# Patient Record
Sex: MALE | Race: WHITE | NOT HISPANIC OR LATINO | Employment: FULL TIME | URBAN - METROPOLITAN AREA
[De-identification: names, ages, dates, MRNs, and addresses within clinical notes are randomized per-mention and may not be internally consistent; named-entity substitution may affect disease eponyms.]

---

## 2017-01-16 ENCOUNTER — OFFICE VISIT (OUTPATIENT)
Dept: INTERNAL MEDICINE | Age: 52
End: 2017-01-16

## 2017-01-16 VITALS
OXYGEN SATURATION: 95 % | BODY MASS INDEX: 32.51 KG/M2 | DIASTOLIC BLOOD PRESSURE: 80 MMHG | HEIGHT: 72 IN | WEIGHT: 240 LBS | HEART RATE: 85 BPM | SYSTOLIC BLOOD PRESSURE: 134 MMHG | TEMPERATURE: 96.4 F

## 2017-01-16 DIAGNOSIS — Z23 NEED FOR VACCINATION FOR STREP PNEUMONIAE: ICD-10-CM

## 2017-01-16 DIAGNOSIS — R73.03 PREDIABETES: Chronic | ICD-10-CM

## 2017-01-16 DIAGNOSIS — Z23 NEED FOR DIPHTHERIA-TETANUS-PERTUSSIS (TDAP) VACCINE: ICD-10-CM

## 2017-01-16 DIAGNOSIS — F17.219 CIGARETTE NICOTINE DEPENDENCE WITH NICOTINE-INDUCED DISORDER: Chronic | ICD-10-CM

## 2017-01-16 DIAGNOSIS — I10 ESSENTIAL HYPERTENSION: Primary | Chronic | ICD-10-CM

## 2017-01-16 DIAGNOSIS — E66.9 OBESITY (BMI 30-39.9): Chronic | ICD-10-CM

## 2017-01-16 DIAGNOSIS — E78.5 HYPERLIPIDEMIA, UNSPECIFIED HYPERLIPIDEMIA TYPE: Chronic | ICD-10-CM

## 2017-01-16 PROCEDURE — 99214 OFFICE O/P EST MOD 30 MIN: CPT | Performed by: INTERNAL MEDICINE

## 2017-01-16 PROCEDURE — 90472 IMMUNIZATION ADMIN EACH ADD: CPT | Performed by: INTERNAL MEDICINE

## 2017-01-16 PROCEDURE — 90471 IMMUNIZATION ADMIN: CPT | Performed by: INTERNAL MEDICINE

## 2017-01-16 PROCEDURE — 90715 TDAP VACCINE 7 YRS/> IM: CPT | Performed by: INTERNAL MEDICINE

## 2017-01-16 PROCEDURE — 90670 PCV13 VACCINE IM: CPT | Performed by: INTERNAL MEDICINE

## 2017-01-16 PROCEDURE — 99406 BEHAV CHNG SMOKING 3-10 MIN: CPT | Performed by: INTERNAL MEDICINE

## 2017-01-16 RX ORDER — ATORVASTATIN CALCIUM 10 MG/1
10 TABLET, FILM COATED ORAL DAILY
Qty: 90 TABLET | Refills: 1 | COMMUNITY
Start: 2017-01-16 | End: 2018-01-15 | Stop reason: SDUPTHER

## 2017-01-16 NOTE — ASSESSMENT & PLAN NOTE
-Counseled on smoking cessation and strongly advised him to quit. (5 minutes)  -Discussed treatment options including nicotine replacement therapy, Wellbutrin, and Chantix. He is not committed to quitting currently.  -Discussed risks/dangers of smoking including risk for various cancers, lung disease and cardiovascular disease.    -F/U with smoking status on next visit

## 2017-01-16 NOTE — PROGRESS NOTES
"Emil HIGUERA Mac / 51 y.o. / male  01/16/2017    VITALS    Visit Vitals   • /80   • Pulse 85   • Temp 96.4 °F (35.8 °C)   • Ht 72\" (182.9 cm)   • Wt 240 lb (109 kg)   • SpO2 95%   • BMI 32.55 kg/m2     BP Readings from Last 3 Encounters:   01/16/17 134/80   12/07/16 132/94     Wt Readings from Last 3 Encounters:   01/16/17 240 lb (109 kg)   12/07/16 235 lb (107 kg)      Body mass index is 32.55 kg/(m^2).    CC:  Main reason(s) for today's visit: Establish Care and Hypertension      HPI:     Patient is a 51-year-old Slov[place] male here to establish care with me.  He was recently seen by our nurse practitioner for continuation of his usual medications for hypertension and hyperlipidemia.  Patient has history of chronic essential hypertension and has been taking blood pressure medication for approximately 3 years.  He is currently on amlodipine/valsartan and hydrochlorothiazide.  Patient denies history of headaches or chest pains.  Denies known history of heart disease or kidney problems.  He has history of hyperlipidemia on atorvastatin 10 mg without complaints of generalized myalgia.  He has been told in the past that his blood sugar levels were high but not in the region of diabetes.  He denies polyuria or polydipsia.  His weight has been trending upwards.  He does not exercise regularly.  Unfortunately she is also a smoker and has been smoking for 30 years and currently he smokes half-pack a day.  He has tried quitting in the past without success at this time he has not committed himself to quitting.    ____________________________________________________________________    ASSESSMENT & PLAN:    Problem List Items Addressed This Visit        High    HTN (hypertension) - Primary (Chronic)    Current Assessment & Plan     Stable. Continue amlodipine/olmesartan and hctz.         Relevant Medications    amlodipine-olmesartan (CHANA) 5-20 MG per tablet    hydrochlorothiazide (HYDRODIURIL) 25 MG tablet    Other Relevant " Orders    Urinalysis With / Microscopic If Indicated       Medium    Hyperlipidemia (Chronic)    Current Assessment & Plan     Stable. Continue atorvastatin.          Relevant Medications    atorvastatin (LIPITOR) 10 MG tablet    Other Relevant Orders    Lipid Panel With / Chol / HDL Ratio    Comprehensive Metabolic Panel    Prediabetes (Chronic)    Relevant Orders    Hemoglobin A1c    TSH+Free T4       Low    Cigarette nicotine dependence with nicotine-induced disorder (Chronic)    Overview     X 30 yrs         Current Assessment & Plan     -Counseled on smoking cessation and strongly advised him to quit. (5 minutes)  -Discussed treatment options including nicotine replacement therapy, Wellbutrin, and Chantix. He is not committed to quitting currently.  -Discussed risks/dangers of smoking including risk for various cancers, lung disease and cardiovascular disease.    -F/U with smoking status on next visit          Obesity (BMI 30-39.9) (Chronic)    Current Assessment & Plan     Lifestyle modification: Attempt to lose weight, Improve dietary compliance, Begin progressive aerobic exercise program 3-5 days a week, Maintain a low sugar/carbohydrate diet, Follow a low fat, low cholesterol diet and Make dinner the lightest meal of day            Other Visit Diagnoses     Need for diphtheria-tetanus-pertussis (Tdap) vaccine        Relevant Orders    Tdap Vaccine Greater Than or Equal To 6yo IM (Completed)    Need for vaccination for Strep pneumoniae        Relevant Orders    Pneumococcal Conjugate Vaccine 13-Valent All (Completed)        Orders Placed This Encounter   Procedures   • Tdap Vaccine Greater Than or Equal To 6yo IM   • Pneumococcal Conjugate Vaccine 13-Valent All   • Hemoglobin A1c   • Lipid Panel With / Chol / HDL Ratio   • Comprehensive Metabolic Panel   • TSH+Free T4   • Urinalysis With / Microscopic If Indicated       Summary/Discussion:     ·       Return in about 4 months (around 5/16/2017) for F/U  chronic medical problems.    Future Appointments  Date Time Provider Department Center   1/20/2017 9:10 AM [facility] None   5/19/2017 9:40 AM Al Francisco MD [facility] None       ____________________________________________________________________    REVIEW OF SYSTEMS    Review of Systems   Constitutional: Positive for fatigue. Unexpected weight change: gain.   HENT: Negative.    Eyes: Negative.    Respiratory: Negative.  Apnea: snores heavily.    Cardiovascular: Negative.    Gastrointestinal: Negative.    Endocrine: Negative.         Obesity    Genitourinary: Negative.    Musculoskeletal: Negative.    Skin: Negative.    Allergic/Immunologic: Negative.    Neurological: Negative.    Hematological: Negative.    Psychiatric/Behavioral: Negative.        PHYSICAL EXAMINATION    Physical Exam   Constitutional: He appears well-nourished. No distress.   Obese     HENT:   Head: Normocephalic.   Mouth/Throat: No oropharyngeal exudate.   Mellampati Airway Class 4    Eyes: Conjunctivae are normal. Pupils are equal, round, and reactive to light.   Neck: Neck supple. No tracheal deviation present. No thyromegaly present.   Cardiovascular: Normal rate, regular rhythm, normal heart sounds and intact distal pulses.  Exam reveals no gallop and no friction rub.    No murmur heard.  Pulmonary/Chest: Effort normal and breath sounds normal.   Abdominal: Soft. Bowel sounds are normal. He exhibits no distension and no mass. There is no tenderness. No hernia.   Musculoskeletal: He exhibits no edema or deformity.   Lymphadenopathy:     He has no cervical adenopathy.        Right: No supraclavicular adenopathy present.        Left: No supraclavicular adenopathy present.   Neurological: He is alert. He has normal reflexes.   Skin: Skin is warm and intact. No rash noted. No cyanosis or erythema. No pallor. Nails show no clubbing.   Psychiatric: He has a normal mood and affect. His behavior is normal. Judgment and thought  content normal. Cognition and memory are normal.       REVIEWED DATA:    Labs:   No results found for: NA, K, AST, ALT, BUN, CREATININE, EGFRIFNONA, EGFRIFAFRI    No results found for: GLU, HGBA1C    No results found for: LDL, HDL, TRIG, CHOLHDLRATIO    No results found for: TSH, FREET4     No results found for: WBC, HGB, PLT     Imaging:        Medical Tests:        Summary of old records / correspondence / consultant report:        Request outside records:          ALLERGIES:    Review of patient's allergies indicates no known allergies.    MEDICATIONS:  Current Outpatient Prescriptions   Medication Sig Dispense Refill   • amlodipine-olmesartan (CHANA) 5-20 MG per tablet Take 1 tablet by mouth Daily. 90 tablet 1   • atorvastatin (LIPITOR) 10 MG tablet Take 1 tablet by mouth Daily. 90 tablet 1   • hydrochlorothiazide (HYDRODIURIL) 25 MG tablet Take 1 tablet by mouth Daily. 90 tablet 1     No current facility-administered medications for this visit.        [facility]    The following portions of the patient's history were reviewed and updated as appropriate: Allergies / Current Medications / Past Medical History / Surgical History / Social History / Family History    PROBLEM LIST:    Patient Active Problem List   Diagnosis   • HTN (hypertension)   • Hyperlipidemia   • Cigarette nicotine dependence with nicotine-induced disorder   • Obesity (BMI 30-39.9)   • Prediabetes       PAST MEDICAL HX:    Past Medical History   Diagnosis Date   • HTN (hypertension)    • Hyperlipidemia        PAST SURGICAL HX:    Past Surgical History   Procedure Laterality Date   • Appendectomy         SOCIAL HX:    Social History     Social History   • Marital status:      Spouse name: N/A   • Number of children: 3   • Years of education: N/A     Occupational History   • Front director      Social History Main Topics   • Smoking status: Current Every Day Smoker     Packs/day: 0.50     Years: 30.00   • Smokeless tobacco: Never Used       Comment: advised to quit, discussed smoking cessation modalities   • Alcohol use 1.8 - 3.0 oz/week     3 - 5 Standard drinks or equivalent per week   • Drug use: None   • Sexual activity: Not Asked     Other Topics Concern   • None     Social History Narrative       FAMILY HX:    Family History   Problem Relation Age of Onset   • Pancreatic cancer Father       at 76   • No Known Problems Brother

## 2017-01-16 NOTE — ASSESSMENT & PLAN NOTE
Lifestyle modification: Attempt to lose weight, Improve dietary compliance, Begin progressive aerobic exercise program 3-5 days a week, Maintain a low sugar/carbohydrate diet, Follow a low fat, low cholesterol diet and Make dinner the lightest meal of day

## 2017-01-16 NOTE — MR AVS SNAPSHOT
Emil HIGUERA Mac   1/16/2017 1:20 PM   Office Visit    Dept Phone:  [phone]   Encounter #:  [id number]    Provider:  Al Francisco MD   Department:  [facility]                Your Full Care Plan              Your Updated Medication List          This list is accurate as of: 1/16/17  2:28 PM.  Always use your most recent med list.                amlodipine-olmesartan 5-20 MG per tablet   Commonly known as:  CHANA   Take 1 tablet by mouth Daily.       atorvastatin 10 MG tablet   Commonly known as:  LIPITOR   Take 1 tablet by mouth Daily.       hydrochlorothiazide 25 MG tablet   Commonly known as:  HYDRODIURIL   Take 1 tablet by mouth Daily.               We Performed the Following     Comprehensive Metabolic Panel     Hemoglobin A1c     Lipid Panel With / Chol / HDL Ratio     Pneumococcal Conjugate Vaccine 13-Valent All     Tdap Vaccine Greater Than or Equal To 8yo IM     TSH+Free T4     Urinalysis With / Microscopic If Indicated       You Were Diagnosed With        Codes Comments    Essential hypertension    -  Primary ICD-10-CM: I10  ICD-9-CM: 401.9     Hyperlipidemia, unspecified hyperlipidemia type     ICD-10-CM: E78.5  ICD-9-CM: 272.4     Prediabetes     ICD-10-CM: R73.03  ICD-9-CM: 790.29     Obesity (BMI 30-39.9)     ICD-10-CM: E66.9  ICD-9-CM: 278.00     Cigarette nicotine dependence with nicotine-induced disorder     ICD-10-CM: F17.219  ICD-9-CM: 292.9     Need for diphtheria-tetanus-pertussis (Tdap) vaccine     ICD-10-CM: Z23  ICD-9-CM: V06.1     Need for vaccination for Strep pneumoniae     ICD-10-CM: Z23  ICD-9-CM: V03.82       Instructions     None    Patient Instructions History      Upcoming Appointments     Visit Type Date Time Department    NEW PATIENT 1/16/2017  1:20 PM [facility]    [facility] 1/20/2017  9:10 AM [facility]    OFFICE VISIT 5/19/2017  9:40 AM [facility]      MyChart Signup     [facility] MyChart allows you to  "send messages to your doctor, view your test results, renew your prescriptions, schedule appointments, and more. To sign up, go to [employer] and click on the Sign Up Now link in the New User? box. Enter your [employer] Activation Code exactly as it appears below along with the last four digits of your Social Security Number and your Date of Birth () to complete the sign-up process. If you do not sign up before the expiration date, you must request a new code.    [employer] Activation Code: [email or web address]  Expires: 2017  2:25 PM    If you have questions, you can email [email or web address] or call [phone] to talk to our [employer] staff. Remember, [employer] is NOT to be used for urgent needs. For medical emergencies, dial 911.               Other Info from Your Visit           Your Appointments     2017  9:10 AM EST   [facility]   [facility] (--)    [address] [place] [id number]   [phone]            May 19, 2017  9:40 AM EDT   Office Visit with Al Francisco MD   [facility] (--)    [facility] [address]   [phone]           Arrive 15 minutes prior to appointment.              Allergies     No Known Allergies      Reason for Visit     Establish Care     Hypertension           Vital Signs     Blood Pressure Pulse Temperature Height Weight Oxygen Saturation    134/80 85 96.4 °F (35.8 °C) 72\" (182.9 cm) 240 lb (109 kg) 95%    Body Mass Index Smoking Status                32.55 kg/m2 Current Every Day Smoker          Problems and Diagnoses Noted     Tobacco dependence    High blood pressure    High cholesterol or triglycerides    Obesity (BMI 30-39.9)    Prediabetes    Need for diphtheria-tetanus-pertussis (Tdap) vaccine        Need for pneumococcal vaccine          Immunizations Administered     Name Date    Pneumococcal Conjugate 13-Valent     Tdap  "

## 2017-01-20 LAB
ALBUMIN SERPL-MCNC: 5.3 G/DL (ref 3.5–5.2)
ALBUMIN/GLOB SERPL: 2.5 G/DL
ALP SERPL-CCNC: 90 U/L (ref 39–117)
ALT SERPL-CCNC: 55 U/L (ref 1–41)
APPEARANCE UR: CLEAR
AST SERPL-CCNC: 33 U/L (ref 1–40)
BILIRUB SERPL-MCNC: 0.8 MG/DL (ref 0.1–1.2)
BILIRUB UR QL STRIP: NORMAL
BUN SERPL-MCNC: 17 MG/DL (ref 6–20)
BUN/CREAT SERPL: 17.3 (ref 7–25)
CALCIUM SERPL-MCNC: 10.1 MG/DL (ref 8.6–10.5)
CHLORIDE SERPL-SCNC: 100 MMOL/L (ref 98–107)
CHOLEST SERPL-MCNC: 164 MG/DL (ref 0–200)
CHOLEST/HDLC SERPL: 4 {RATIO}
CO2 SERPL-SCNC: 23.6 MMOL/L (ref 22–29)
COLOR UR: YELLOW
CREAT SERPL-MCNC: 0.98 MG/DL (ref 0.76–1.27)
GLOBULIN SER CALC-MCNC: 2.1 GM/DL
GLUCOSE SERPL-MCNC: 150 MG/DL (ref 65–99)
GLUCOSE UR QL: NORMAL
HBA1C MFR BLD: 6.5 % (ref 4.8–5.6)
HDLC SERPL-MCNC: 41 MG/DL (ref 40–60)
HGB UR QL STRIP: NORMAL
KETONES UR QL STRIP: NORMAL
LDLC SERPL CALC-MCNC: 81 MG/DL (ref 0–100)
LEUKOCYTE ESTERASE UR QL STRIP: NORMAL
NITRITE UR QL STRIP: NORMAL
PH UR STRIP: 6 [PH] (ref 5–8)
POTASSIUM SERPL-SCNC: 4.2 MMOL/L (ref 3.5–5.2)
PROT SERPL-MCNC: 7.4 G/DL (ref 6–8.5)
PROT UR QL STRIP: NORMAL
SODIUM SERPL-SCNC: 140 MMOL/L (ref 136–145)
SP GR UR: 1.01 (ref 1–1.03)
T4 FREE SERPL-MCNC: 1.27 NG/DL (ref 0.93–1.7)
TRIGL SERPL-MCNC: 212 MG/DL (ref 0–150)
TSH SERPL DL<=0.005 MIU/L-ACNC: 5.63 MIU/ML (ref 0.27–4.2)
UROBILINOGEN UR STRIP-MCNC: NORMAL MG/DL
VLDLC SERPL CALC-MCNC: 42.4 MG/DL (ref 5–40)

## 2017-01-20 NOTE — PROGRESS NOTES
Call patient with his test result(s) and mail the results to him.    Based on recent labs, he has new onset DM 2. Start metformin  mg 1 tablet BID.   Send order for One Touch glucometer and check BS every day in the morning before breakfast.   Change his f/u to 1 month for Diabetes.

## 2017-01-23 ENCOUNTER — TELEPHONE (OUTPATIENT)
Dept: INTERNAL MEDICINE | Age: 52
End: 2017-01-23

## 2017-01-23 DIAGNOSIS — E11.8 TYPE 2 DIABETES MELLITUS WITH COMPLICATION, WITHOUT LONG-TERM CURRENT USE OF INSULIN (HCC): Primary | ICD-10-CM

## 2017-01-23 PROBLEM — E11.9 DM TYPE 2 (DIABETES MELLITUS, TYPE 2) (HCC): Status: ACTIVE | Noted: 2017-01-23

## 2017-01-23 RX ORDER — BLOOD-GLUCOSE METER
KIT MISCELLANEOUS
Qty: 1 EACH | Refills: 0 | Status: SHIPPED | OUTPATIENT
Start: 2017-01-23 | End: 2017-01-27 | Stop reason: CLARIF

## 2017-01-23 RX ORDER — METFORMIN HYDROCHLORIDE 500 MG/1
500 TABLET, EXTENDED RELEASE ORAL 2 TIMES DAILY
Qty: 60 TABLET | Refills: 0 | Status: SHIPPED | OUTPATIENT
Start: 2017-01-23 | End: 2017-03-11 | Stop reason: SDUPTHER

## 2017-01-23 NOTE — TELEPHONE ENCOUNTER
Pt informed of results, sent pt prescriptions over to pt pharmacy along with glucometer test strips and lancets. Pt will call back for an appt in 1 month per Dr Francisco's request  -    ---- Message -----      From: Al Francisco MD      Sent: 1/20/2017   5:39 PM        To: Alicia Bauer MA     Call patient with his test result(s) and mail the results to him.     Based on recent labs, he has new onset DM 2. Start metformin  mg 1 tablet BID.   Send order for One Touch glucometer and check BS every day in the morning before breakfast.   Change his f/u to 1 month for Diabetes.

## 2017-01-23 NOTE — TELEPHONE ENCOUNTER
----- Message from Alicia Bauer MA sent at 1/23/2017  7:25 AM EST -----      ----- Message -----     From: Al Francisco MD     Sent: 1/20/2017   5:39 PM       To: Alicia Bauer MA    Call patient with his test result(s) and mail the results to him.    Based on recent labs, he has new onset DM 2. Start metformin  mg 1 tablet BID.   Send order for One Touch glucometer and check BS every day in the morning before breakfast.   Change his f/u to 1 month for Diabetes.

## 2017-01-27 ENCOUNTER — TELEPHONE (OUTPATIENT)
Dept: INTERNAL MEDICINE | Age: 52
End: 2017-01-27

## 2017-01-27 DIAGNOSIS — E11.8 TYPE 2 DIABETES MELLITUS WITH COMPLICATION, WITHOUT LONG-TERM CURRENT USE OF INSULIN (HCC): Primary | ICD-10-CM

## 2017-01-27 NOTE — TELEPHONE ENCOUNTER
Pt uses Accu-Chek meter system to check BS once daily. Ins will not cover meter. They will cover Contour Next EZ meter.     Okay to change?    KD

## 2017-03-11 DIAGNOSIS — E11.8 TYPE 2 DIABETES MELLITUS WITH COMPLICATION, WITHOUT LONG-TERM CURRENT USE OF INSULIN (HCC): ICD-10-CM

## 2017-03-13 RX ORDER — METFORMIN HYDROCHLORIDE 500 MG/1
TABLET, EXTENDED RELEASE ORAL
Qty: 60 TABLET | Refills: 2 | Status: SHIPPED | OUTPATIENT
Start: 2017-03-13 | End: 2018-01-19 | Stop reason: SDUPTHER

## 2017-05-19 ENCOUNTER — OFFICE VISIT (OUTPATIENT)
Dept: INTERNAL MEDICINE | Age: 52
End: 2017-05-19

## 2017-05-19 VITALS
TEMPERATURE: 97.9 F | SYSTOLIC BLOOD PRESSURE: 122 MMHG | OXYGEN SATURATION: 94 % | WEIGHT: 238 LBS | DIASTOLIC BLOOD PRESSURE: 80 MMHG | HEIGHT: 72 IN | BODY MASS INDEX: 32.23 KG/M2 | HEART RATE: 69 BPM

## 2017-05-19 DIAGNOSIS — E78.2 MIXED HYPERLIPIDEMIA: Chronic | ICD-10-CM

## 2017-05-19 DIAGNOSIS — F17.219 CIGARETTE NICOTINE DEPENDENCE WITH NICOTINE-INDUCED DISORDER: Chronic | ICD-10-CM

## 2017-05-19 DIAGNOSIS — E66.9 OBESITY (BMI 30-39.9): Chronic | ICD-10-CM

## 2017-05-19 DIAGNOSIS — I10 ESSENTIAL HYPERTENSION: Chronic | ICD-10-CM

## 2017-05-19 DIAGNOSIS — E11.8 TYPE 2 DIABETES MELLITUS WITH COMPLICATION, WITHOUT LONG-TERM CURRENT USE OF INSULIN (HCC): Primary | Chronic | ICD-10-CM

## 2017-05-19 PROBLEM — E11.9 DM TYPE 2 (DIABETES MELLITUS, TYPE 2): Chronic | Status: ACTIVE | Noted: 2017-01-23

## 2017-05-19 PROCEDURE — 99214 OFFICE O/P EST MOD 30 MIN: CPT | Performed by: INTERNAL MEDICINE

## 2017-05-20 LAB
ALBUMIN/CREAT UR: <8.6 MG/G CREAT (ref 0–30)
CREAT UR-MCNC: 34.9 MG/DL
HBA1C MFR BLD: 6.85 % (ref 4.8–5.6)
MICROALBUMIN UR-MCNC: <3 UG/ML
T4 FREE SERPL-MCNC: 1.19 NG/DL (ref 0.93–1.7)
TSH SERPL DL<=0.005 MIU/L-ACNC: 3.01 MIU/ML (ref 0.27–4.2)

## 2017-05-22 ENCOUNTER — TELEPHONE (OUTPATIENT)
Dept: INTERNAL MEDICINE | Age: 52
End: 2017-05-22

## 2017-06-08 DIAGNOSIS — I10 ESSENTIAL HYPERTENSION: ICD-10-CM

## 2017-06-08 RX ORDER — HYDROCHLOROTHIAZIDE 25 MG/1
TABLET ORAL
Qty: 90 TABLET | Refills: 1 | Status: SHIPPED | OUTPATIENT
Start: 2017-06-08 | End: 2017-10-12 | Stop reason: SDUPTHER

## 2017-06-08 RX ORDER — ATORVASTATIN CALCIUM 10 MG/1
TABLET, FILM COATED ORAL
Qty: 90 TABLET | Refills: 1 | Status: SHIPPED | OUTPATIENT
Start: 2017-06-08 | End: 2017-12-06 | Stop reason: SDUPTHER

## 2017-06-08 RX ORDER — AMLODIPINE AND OLMESARTAN MEDOXOMIL 5; 20 MG/1; MG/1
TABLET ORAL
Qty: 90 TABLET | Refills: 1 | Status: SHIPPED | OUTPATIENT
Start: 2017-06-08 | End: 2017-10-03 | Stop reason: SDUPTHER

## 2017-07-12 DIAGNOSIS — E11.8 TYPE 2 DIABETES MELLITUS WITH COMPLICATION, WITHOUT LONG-TERM CURRENT USE OF INSULIN (HCC): ICD-10-CM

## 2017-07-12 RX ORDER — METFORMIN HYDROCHLORIDE 500 MG/1
TABLET, EXTENDED RELEASE ORAL
Qty: 60 TABLET | Refills: 2 | Status: SHIPPED | OUTPATIENT
Start: 2017-07-12 | End: 2018-01-19 | Stop reason: SDUPTHER

## 2017-10-03 DIAGNOSIS — I10 ESSENTIAL HYPERTENSION: ICD-10-CM

## 2017-10-03 RX ORDER — AMLODIPINE AND OLMESARTAN MEDOXOMIL 5; 20 MG/1; MG/1
TABLET ORAL
Qty: 90 TABLET | Refills: 0 | Status: SHIPPED | OUTPATIENT
Start: 2017-10-03 | End: 2018-01-15 | Stop reason: SDUPTHER

## 2017-10-12 ENCOUNTER — TELEPHONE (OUTPATIENT)
Dept: INTERNAL MEDICINE | Age: 52
End: 2017-10-12

## 2017-10-12 DIAGNOSIS — I10 ESSENTIAL HYPERTENSION: ICD-10-CM

## 2017-10-12 RX ORDER — HYDROCHLOROTHIAZIDE 25 MG/1
25 TABLET ORAL DAILY
Qty: 30 TABLET | Refills: 0 | Status: SHIPPED | OUTPATIENT
Start: 2017-10-12 | End: 2017-12-29 | Stop reason: SDUPTHER

## 2017-10-12 NOTE — TELEPHONE ENCOUNTER
[facility] called requesting a verbal to refill rx HCTZ 25mg for the pt.     Pls call [facility] #[phone].

## 2017-12-06 DIAGNOSIS — I10 ESSENTIAL HYPERTENSION: ICD-10-CM

## 2017-12-06 RX ORDER — ATORVASTATIN CALCIUM 10 MG/1
10 TABLET, FILM COATED ORAL DAILY
Qty: 30 TABLET | Refills: 0 | Status: SHIPPED | OUTPATIENT
Start: 2017-12-06 | End: 2018-01-15 | Stop reason: SDUPTHER

## 2017-12-06 RX ORDER — HYDROCHLOROTHIAZIDE 25 MG/1
25 TABLET ORAL DAILY
Qty: 30 TABLET | Refills: 0 | Status: SHIPPED | OUTPATIENT
Start: 2017-12-06 | End: 2018-01-19 | Stop reason: SDUPTHER

## 2017-12-29 DIAGNOSIS — E11.9 TYPE 2 DIABETES MELLITUS WITHOUT COMPLICATION, WITHOUT LONG-TERM CURRENT USE OF INSULIN (HCC): Primary | ICD-10-CM

## 2017-12-29 DIAGNOSIS — I10 ESSENTIAL HYPERTENSION: ICD-10-CM

## 2018-01-02 RX ORDER — HYDROCHLOROTHIAZIDE 25 MG/1
25 TABLET ORAL DAILY
Qty: 30 TABLET | Refills: 0 | Status: SHIPPED | OUTPATIENT
Start: 2018-01-02 | End: 2018-01-19 | Stop reason: SDUPTHER

## 2018-01-02 RX ORDER — METFORMIN HYDROCHLORIDE 500 MG/1
500 TABLET, EXTENDED RELEASE ORAL 2 TIMES DAILY
Qty: 60 TABLET | Refills: 0 | Status: SHIPPED | OUTPATIENT
Start: 2018-01-02 | End: 2018-01-19 | Stop reason: SDUPTHER

## 2018-01-15 ENCOUNTER — TELEPHONE (OUTPATIENT)
Dept: INTERNAL MEDICINE | Age: 53
End: 2018-01-15

## 2018-01-15 DIAGNOSIS — I10 ESSENTIAL HYPERTENSION: ICD-10-CM

## 2018-01-15 DIAGNOSIS — E78.5 HYPERLIPIDEMIA, UNSPECIFIED HYPERLIPIDEMIA TYPE: Chronic | ICD-10-CM

## 2018-01-15 RX ORDER — ATORVASTATIN CALCIUM 10 MG/1
10 TABLET, FILM COATED ORAL DAILY
Qty: 90 TABLET | Refills: 0 | Status: SHIPPED | OUTPATIENT
Start: 2018-01-15 | End: 2018-04-22 | Stop reason: SDUPTHER

## 2018-01-15 RX ORDER — AMLODIPINE AND OLMESARTAN MEDOXOMIL 5; 20 MG/1; MG/1
1 TABLET ORAL DAILY
Qty: 90 TABLET | Refills: 0 | Status: SHIPPED | OUTPATIENT
Start: 2018-01-15 | End: 2018-07-25 | Stop reason: SDUPTHER

## 2018-01-15 NOTE — TELEPHONE ENCOUNTER
Pt called scheduled a f/u and also requesting a refill on his Lipitor and Amlodipine.   [facility] pharmacy  Pt's # [phone]  Thanks SP

## 2018-01-19 ENCOUNTER — OFFICE VISIT (OUTPATIENT)
Dept: INTERNAL MEDICINE | Age: 53
End: 2018-01-19

## 2018-01-19 VITALS
BODY MASS INDEX: 32.64 KG/M2 | HEART RATE: 92 BPM | WEIGHT: 241 LBS | TEMPERATURE: 97.6 F | HEIGHT: 72 IN | DIASTOLIC BLOOD PRESSURE: 92 MMHG | SYSTOLIC BLOOD PRESSURE: 140 MMHG | OXYGEN SATURATION: 95 %

## 2018-01-19 DIAGNOSIS — R74.01 ELEVATED TRANSAMINASE LEVEL: ICD-10-CM

## 2018-01-19 DIAGNOSIS — E66.9 OBESITY (BMI 30-39.9): Chronic | ICD-10-CM

## 2018-01-19 DIAGNOSIS — I10 ESSENTIAL HYPERTENSION: ICD-10-CM

## 2018-01-19 DIAGNOSIS — E78.2 MIXED HYPERLIPIDEMIA: Chronic | ICD-10-CM

## 2018-01-19 DIAGNOSIS — F17.219 CIGARETTE NICOTINE DEPENDENCE WITH NICOTINE-INDUCED DISORDER: Chronic | ICD-10-CM

## 2018-01-19 DIAGNOSIS — E11.8 TYPE 2 DIABETES MELLITUS WITH COMPLICATION, WITHOUT LONG-TERM CURRENT USE OF INSULIN (HCC): Primary | ICD-10-CM

## 2018-01-19 LAB
ALBUMIN SERPL-MCNC: 4.9 G/DL (ref 3.5–5.2)
ALBUMIN/GLOB SERPL: 1.8 G/DL
ALP SERPL-CCNC: 96 U/L (ref 39–117)
ALT SERPL-CCNC: 108 U/L (ref 1–41)
AST SERPL-CCNC: 82 U/L (ref 1–40)
BILIRUB SERPL-MCNC: 0.7 MG/DL (ref 0.1–1.2)
BUN SERPL-MCNC: 13 MG/DL (ref 6–20)
BUN/CREAT SERPL: 14.9 (ref 7–25)
CALCIUM SERPL-MCNC: 9.8 MG/DL (ref 8.6–10.5)
CHLORIDE SERPL-SCNC: 97 MMOL/L (ref 98–107)
CHOLEST SERPL-MCNC: 238 MG/DL (ref 0–200)
CHOLEST/HDLC SERPL: 6.1 {RATIO}
CO2 SERPL-SCNC: 22.9 MMOL/L (ref 22–29)
CREAT SERPL-MCNC: 0.87 MG/DL (ref 0.76–1.27)
GLOBULIN SER CALC-MCNC: 2.7 GM/DL
GLUCOSE SERPL-MCNC: 130 MG/DL (ref 65–99)
HBA1C MFR BLD: 7.2 % (ref 4.8–5.6)
HDLC SERPL-MCNC: 39 MG/DL (ref 40–60)
LDLC SERPL CALC-MCNC: 126 MG/DL (ref 0–100)
POTASSIUM SERPL-SCNC: 4.2 MMOL/L (ref 3.5–5.2)
PROT SERPL-MCNC: 7.6 G/DL (ref 6–8.5)
SODIUM SERPL-SCNC: 138 MMOL/L (ref 136–145)
T4 FREE SERPL-MCNC: 1.24 NG/DL (ref 0.93–1.7)
TRIGL SERPL-MCNC: 365 MG/DL (ref 0–150)
TSH SERPL DL<=0.005 MIU/L-ACNC: 2.94 MIU/ML (ref 0.27–4.2)
VLDLC SERPL CALC-MCNC: 73 MG/DL (ref 5–40)

## 2018-01-19 PROCEDURE — 99214 OFFICE O/P EST MOD 30 MIN: CPT | Performed by: INTERNAL MEDICINE

## 2018-01-19 PROCEDURE — G0009 ADMIN PNEUMOCOCCAL VACCINE: HCPCS | Performed by: INTERNAL MEDICINE

## 2018-01-19 PROCEDURE — 90732 PPSV23 VACC 2 YRS+ SUBQ/IM: CPT | Performed by: INTERNAL MEDICINE

## 2018-01-19 RX ORDER — METFORMIN HYDROCHLORIDE 500 MG/1
500 TABLET, EXTENDED RELEASE ORAL
Qty: 60 TABLET | Refills: 2
Start: 2018-01-19 | End: 2018-01-23 | Stop reason: ALTCHOICE

## 2018-01-19 RX ORDER — HYDROCHLOROTHIAZIDE 25 MG/1
25 TABLET ORAL DAILY
Qty: 30 TABLET | Refills: 0
Start: 2018-01-19 | End: 2018-01-31 | Stop reason: SDUPTHER

## 2018-01-19 NOTE — PROGRESS NOTES
"[facility]  FLORIDALMA CARO M.D.      Emil Watts / 52 y.o. / male  01/19/2018      MEDICATIONS  Current Outpatient Prescriptions   Medication Sig Dispense Refill   • amlodipine-olmesartan (CHANA) 5-20 MG per tablet Take 1 tablet by mouth Daily. 90 tablet 0   • atorvastatin (LIPITOR) 10 MG tablet Take 1 tablet by mouth Daily. 90 tablet 0   • Blood Glucose Monitoring Suppl (TEQUILA CONTOUR NEXT EZ) W/DEVICE kit Use to test blood sugar daily. 1 kit 0   • glucose blood test strip Test one time daily before breakfast 100 each 0   • hydrochlorothiazide (HYDRODIURIL) 25 MG tablet Take 1 tablet by mouth Daily. *PT MUST MAKE APPT FOR FUTURE REFILLS* 30 tablet 0   • Lancet Devices (ACCU-CHEK SOFTCLIX) lancets Test one time daily 100 each 0   • metFORMIN ER (GLUCOPHAGE-XR) 500 MG 24 hr tablet Take 1 tablet by mouth 2 (Two) Times a Day. 60 tablet 2     No current facility-administered medications for this visit.          VITALS:    Visit Vitals   • /92   • Pulse 92   • Temp 97.6 °F (36.4 °C)   • Ht 182.9 cm (72.01\")   • Wt 109 kg (241 lb)   • SpO2 95%   • BMI 32.68 kg/m2       BP Readings from Last 3 Encounters:   01/19/18 140/92   05/19/17 122/80   01/16/17 134/80     Wt Readings from Last 3 Encounters:   01/19/18 109 kg (241 lb)   05/19/17 108 kg (238 lb)   01/16/17 109 kg (240 lb)      Body mass index is 32.68 kg/(m^2).    CC:  Main reason(s) for today's visit: Diabetes      HPI:     Chronic type 2 diabetes:  Home blood sugar levels: does not check, has no significant low sugar symptoms/problems. Current therapy: metformin.  Most recent relevant labs:   Lab Results   Component Value Date    HGBA1C 6.85 (H) 05/19/2017    HGBA1C 6.50 (H) 01/20/2017    CREATININE 0.98 01/20/2017    LDL 81 01/20/2017    MICROALBUR <3.0 05/19/2017     Chronic essential hypertension:  Since prior visit: compliant with medication(s), does not check blood pressure at home and denies significant problems with medication(s).  Most recent " in-office blood pressure readings:   BP Readings from Last 3 Encounters:   01/19/18 140/92   05/19/17 122/80   01/16/17 134/80     Chronic hyperlipidemia:  Current therapy include atorvastatin, denies problems with medication.    Most recent labs:   Lab Results   Component Value Date    LDL 81 01/20/2017    HDL 41 01/20/2017    TRIG 212 (H) 01/20/2017    CHOLHDLRATIO 4.00 01/20/2017     Obesity: Weight has not changed significantly since last visit, has not made any significant dietary improvement, has not made significant changes to physical activity level.  Weight loss efforts: no specific plan(s).  Current Body mass index is 32.68 kg/(m^2)..   Wt Readings from Last 3 Encounters:   01/19/18 109 kg (241 lb)   05/19/17 108 kg (238 lb)   01/16/17 109 kg (240 lb)       Still smokes 1/2 ppd.     Patient Care Team:  Al Francisco MD as PCP - General (Internal Medicine)  ____________________________________________________________________    ASSESSMENT & PLAN:    Problem List Items Addressed This Visit        High    DM type 2 (diabetes mellitus, type 2) - Primary (Chronic)    Current Assessment & Plan     Check a1c level. Continue metformin  mg two daily.   Referral placed to eye specialist again         Relevant Medications    glucose blood test strip    Lancet Devices (ACCU-CHEK SOFTCLIX) lancets    Blood Glucose Monitoring Suppl (TEQUILA CONTOUR NEXT EZ) W/DEVICE kit    metFORMIN ER (GLUCOPHAGE-XR) 500 MG 24 hr tablet    Other Relevant Orders    Hemoglobin A1c    TSH+Free T4    Pneumococcal Polysaccharide Vaccine 23-Valent Greater Than or Equal To 1yo Subcutaneous / IM    Ambulatory Referral to Ophthalmology       Medium    HTN (hypertension) (Chronic)    Overview     Stable. Continue amlodipine/olmesartan 5/20 and hctz 25 mg daily.         Current Assessment & Plan     Monitor home BP.          Relevant Medications    amlodipine-olmesartan (CHANA) 5-20 MG per tablet    hydrochlorothiazide (HYDRODIURIL) 25 MG tablet     Hyperlipidemia (Chronic)    Overview     Stable. Continue atorvastatin.          Relevant Medications    atorvastatin (LIPITOR) 10 MG tablet    Other Relevant Orders    Lipid Panel With / Chol / HDL Ratio    Comprehensive Metabolic Panel       Low    Cigarette nicotine dependence with nicotine-induced disorder (Chronic)    Overview     X 30 yrs         Current Assessment & Plan     Advised to quit smoking. Counseling given.          Obesity (BMI 30-39.9) (Chronic)    Overview     Maintain a low sugar/starch/carbohydrate diet and exercise regularly. Weight loss advised.               Orders Placed This Encounter   Procedures   • Pneumococcal Polysaccharide Vaccine 23-Valent Greater Than or Equal To 3yo Subcutaneous / IM   • Hemoglobin A1c   • Lipid Panel With / Chol / HDL Ratio   • Comprehensive Metabolic Panel   • TSH+Free T4   • Ambulatory Referral to Ophthalmology       Summary/Discussion:  ·     Return in about 4 months (around 5/19/2018) for Diabetes and co-morbid conditions.    No future appointments.    ____________________________________________________________________    REVIEW OF SYSTEMS    Review of Systems  Constitutional neg  Resp neg  CV neg  Gi neg  Neuro neg      PHYSICAL EXAMINATION    Physical Exam  Constitutional  No distress, obese   Cardiovascular Rate  normal . Rhythm: regular . Heart sounds:  Normal  Pulmonary/Chest  Effort normal. Breath sounds:  Normal  Psychiatric  Alert. Judgment and thought content normal. Mood normal    REVIEWED DATA:    Labs:     Lab Results   Component Value Date     01/20/2017    K 4.2 01/20/2017    AST 33 01/20/2017    ALT 55 (H) 01/20/2017    BUN 17 01/20/2017    CREATININE 0.98 01/20/2017    EGFRIFNONA 81 01/20/2017    EGFRIFAFRI 99 01/20/2017       Lab Results   Component Value Date    HGBA1C 6.85 (H) 05/19/2017    HGBA1C 6.50 (H) 01/20/2017     (H) 01/20/2017    MICROALBUR <3.0 05/19/2017       Lab Results   Component Value Date    LDL 81  2017    HDL 41 2017    TRIG 212 (H) 2017    CHOLHDLRATIO 4.00 2017       Lab Results   Component Value Date    TSH 3.010 2017    FREET4 1.19 2017       No results found for: WBC, HGB, PLT    Imaging:         Medical Tests:         Summary of old records / correspondence / consultant report:         Request outside records:         ALLERGIES  No Known Allergies     PFSH:     The following portions of the patient's history were reviewed and updated as appropriate: Allergies / Current Medications / Past Medical History / Surgical History / Social History / Family History    PROBLEM LIST   Patient Active Problem List   Diagnosis   • HTN (hypertension)   • Hyperlipidemia   • Cigarette nicotine dependence with nicotine-induced disorder   • Obesity (BMI 30-39.9)   • DM type 2 (diabetes mellitus, type 2)       PAST MEDICAL HISTORY  Past Medical History:   Diagnosis Date   • Diabetes mellitus    • HTN (hypertension)    • Hyperlipidemia        SURGICAL HISTORY  Past Surgical History:   Procedure Laterality Date   • APPENDECTOMY         SOCIAL HISTORY  Social History     Social History   • Marital status:      Spouse name: N/A   • Number of children: 3   • Years of education: N/A     Occupational History   • Front director      Social History Main Topics   • Smoking status: Current Every Day Smoker     Packs/day: 0.50     Years: 30.00   • Smokeless tobacco: Never Used      Comment: advised to quit, discussed smoking cessation modalities   • Alcohol use 1.8 - 3.0 oz/week     3 - 5 Standard drinks or equivalent per week   • Drug use: None   • Sexual activity: Not Asked     Other Topics Concern   • None     Social History Narrative   • None       FAMILY HISTORY  Family History   Problem Relation Age of Onset   • Pancreatic cancer Father       at 76   • No Known Problems Brother          **Regino Disclaimer:   Much of this encounter note is an electronic transcription/translation of  spoken language to printed text. The electronic translation of spoken language may permit erroneous, or at times, nonsensical words or phrases to be inadvertently transcribed. Although I have reviewed the note for such errors, some may still exist.

## 2018-01-22 NOTE — PROGRESS NOTES
Call patient with his test result(s) and mail the results to him if MyChart is NOT active.    1. Diabetes is uncontrolled. Change metformin to Janumet XR 50/1000 TWO daily.  2. Cholesterol is worse. Doubt he is taking medication. However due to elevated LFTs have him HOLD ATORVASTATIN for now.   3. To evaluate elevated LFTs, will check Liver US and additional liver labs (I will place the order for these).   ** CHANGE NEXT F/U TO 2 MONTHS.

## 2018-01-23 ENCOUNTER — TELEPHONE (OUTPATIENT)
Dept: INTERNAL MEDICINE | Age: 53
End: 2018-01-23

## 2018-01-23 DIAGNOSIS — E11.8 TYPE 2 DIABETES MELLITUS WITH COMPLICATION, WITHOUT LONG-TERM CURRENT USE OF INSULIN (HCC): Primary | Chronic | ICD-10-CM

## 2018-01-23 NOTE — TELEPHONE ENCOUNTER
Pt's daughter called asking why the pt is having to have liver tests done and see a specialist? She wasn't aware anything was wrong.  Please call David Watts @ [phone]  Thanks SP

## 2018-01-23 NOTE — TELEPHONE ENCOUNTER
Attempted to call daughter back to discuss pt's lab results, including elevated LFTs which require further testing. No  or available voicemail.     KD

## 2018-01-23 NOTE — TELEPHONE ENCOUNTER
----- Message from Margaret Almeida LPN sent at 1/23/2018  7:44 AM EST -----      ----- Message -----     From: Al Francisco MD     Sent: 1/22/2018   6:35 PM       To: Osvaldo Ashley MA    Call patient with his test result(s) and mail the results to him if MyChart is NOT active.    1. Diabetes is uncontrolled. Change metformin to Janumet XR 50/1000 TWO daily.  2. Cholesterol is worse. Doubt he is taking medication. However due to elevated LFTs have him HOLD ATORVASTATIN for now.   3. To evaluate elevated LFTs, will check Liver US and additional liver labs (I will place the order for these).   ## CHANGE NEXT F/U TO 2 MONTHS.

## 2018-01-23 NOTE — TELEPHONE ENCOUNTER
Spoke to pt's daughter re: pt's lab results.    Pt's daughter was informed of worsening diabetes. Pt's daughter was informed of change in Rx to Janument XR, BID.     Pt's daughter was informed of pt's worsening cholesterol. However, pt's daughter was also informed that pt's liver enzymes are also elevated and pt should hold atorvastatin for now.     Pt's daughter was informed that d/t pt's elevated LFT's, pt will need liver US and additional labs.    Pt's daughter was advised to have pt return in 2 months rather than 4 months for f/u.    Pt's daughter demonstrated understanding. Appt was made for 2 months instead of 4 months.    KD

## 2018-01-24 ENCOUNTER — TELEPHONE (OUTPATIENT)
Dept: INTERNAL MEDICINE | Age: 53
End: 2018-01-24

## 2018-01-24 NOTE — TELEPHONE ENCOUNTER
"----- Message from Alicia Bauer MA sent at 1/24/2018  6:51 AM EST -----  Regarding: FW: Test Results Question  Contact: [phone]      ----- Message -----     From: Emil Watts     Sent: 1/24/2018  12:44 AM       To: [facility]  Subject: Test Results Question                             Hello. This is a daughter of Emil Watts who always used to visit with him whenever he visits the office.    Today we got a phone call from your office that my father has to get a liver test and he needs to change his diabetes medication that he is currently taking right now since it is uncontrolled.   However, our concern is that he didn't have any medications except \"hydrochlorothiazide\" because he could not get refilled any medications except that one. Especially, he did not have \"METFORMIN ER\", which I know by far is diabetes control medicine, for a couple weeks. We guess that is the main reason why all of his test results was shown abnormally.   We are quite questionable about whether we should change the medicine right away and get the liver test even though it was under the abnormal circumstance. The reason he could not get the medicine was because I was busy so I could not contact either pharmacy or the clinic right away. Until we visit your office last Friday, he was only taking the medicine 'hydrochlorothiazide' that I mentioned above.   So what we are thinking is since we rescheduled the appointment in March, we could see how his condition is, get checked up, and decide what we could do.   I will wait for your medical opinion about our suggestion. Thank you very much for your caring.  "

## 2018-01-24 NOTE — TELEPHONE ENCOUNTER
Pt daughter David who has consent of file to talk called in about her father Metformin she said that he can't afford the meds and wants to know if he really needs to take this med.  Pt daughter can be reached at [phone], pt daughter said she needs to be called instead of her father the pt because he doesn't speak English.

## 2018-01-24 NOTE — TELEPHONE ENCOUNTER
Call daughter re: her questions.    1) He will do better with Janumet XR. I would recommend switching to that medication.  2) Because his liver enzymes are high we need to make sure there are no other important causes of this. That's why he needs the additional labs for liver and the ultrasound. But he needs to HOLD HIS CHOLESTEROL MEDICATION FOR NOW. (REMINDER: Update med list, maintain dx association, and update change on CURRENT ASSESSMENT/PLAN)

## 2018-01-31 DIAGNOSIS — E11.9 TYPE 2 DIABETES MELLITUS WITHOUT COMPLICATION, WITHOUT LONG-TERM CURRENT USE OF INSULIN (HCC): ICD-10-CM

## 2018-01-31 DIAGNOSIS — I10 ESSENTIAL HYPERTENSION: ICD-10-CM

## 2018-01-31 RX ORDER — METFORMIN HYDROCHLORIDE 500 MG/1
TABLET, EXTENDED RELEASE ORAL
Qty: 60 TABLET | Refills: 3 | Status: SHIPPED | OUTPATIENT
Start: 2018-01-31 | End: 2018-02-09 | Stop reason: ALTCHOICE

## 2018-01-31 RX ORDER — HYDROCHLOROTHIAZIDE 25 MG/1
TABLET ORAL
Qty: 30 TABLET | Refills: 3 | Status: SHIPPED | OUTPATIENT
Start: 2018-01-31 | End: 2018-06-23 | Stop reason: SDUPTHER

## 2018-02-03 ENCOUNTER — HOSPITAL ENCOUNTER (OUTPATIENT)
Dept: ULTRASOUND IMAGING | Facility: HOSPITAL | Age: 53
Discharge: HOME | End: 2018-02-03
Admitting: INTERNAL MEDICINE

## 2018-02-03 PROCEDURE — 76705 ECHO EXAM OF ABDOMEN: CPT

## 2018-02-07 DIAGNOSIS — R94.5 ABNORMAL LIVER FUNCTION: Primary | ICD-10-CM

## 2018-02-09 ENCOUNTER — TELEPHONE (OUTPATIENT)
Dept: INTERNAL MEDICINE | Age: 53
End: 2018-02-09

## 2018-02-09 DIAGNOSIS — E11.8 TYPE 2 DIABETES MELLITUS WITH COMPLICATION, WITHOUT LONG-TERM CURRENT USE OF INSULIN (HCC): Chronic | ICD-10-CM

## 2018-02-09 NOTE — TELEPHONE ENCOUNTER
Pt's daughter called stating pt needs a refill on his Janumet. I didn't see it in his chart but she states he is taking it and needs a refill.  [facility]  Thanks SP

## 2018-02-14 LAB
ACTIN IGG SERPL-ACNC: 13 UNITS (ref 0–19)
ANA SER QL: NEGATIVE
CERULOPLASMIN SERPL-MCNC: 23.2 MG/DL (ref 16–31)
FERRITIN SERPL-MCNC: 318 NG/ML (ref 30–400)
HAV IGM SERPL QL IA: NEGATIVE
HBV CORE AB SERPL QL IA: NEGATIVE
HBV CORE IGM SERPL QL IA: NEGATIVE
HBV E AB SERPL QL IA: NEGATIVE
HBV E AG SERPL QL IA: NEGATIVE
HBV SURFACE AB SER QL: NON REACTIVE
HBV SURFACE AG SERPL QL IA: NEGATIVE
HCV AB S/CO SERPL IA: 0.1 S/CO RATIO (ref 0–0.9)
HCV AB S/CO SERPL IA: 0.1 S/CO RATIO (ref 0–0.9)
HFE GENE MUT ANL BLD/T: NORMAL
IRON SATN MFR SERPL: 24 % (ref 20–50)
IRON SATN MFR SERPL: 27 % SATURATION
IRON SERPL-MCNC: 106 MCG/DL (ref 59–158)
IRON SERPL-MCNC: 109 UG/DL
LABORATORY COMMENT REPORT: NORMAL
MITOCHONDRIA M2 IGG SER-ACNC: <20 UNITS (ref 0–20)
TIBC SERPL-MCNC: 443 MCG/DL
TRANSFERRIN SERPL-MCNC: 290 MG/DL
UIBC SERPL-MCNC: 337 MCG/DL

## 2018-03-23 ENCOUNTER — OFFICE VISIT (OUTPATIENT)
Dept: INTERNAL MEDICINE | Age: 53
End: 2018-03-23

## 2018-03-23 VITALS
TEMPERATURE: 97.9 F | BODY MASS INDEX: 32.91 KG/M2 | DIASTOLIC BLOOD PRESSURE: 82 MMHG | WEIGHT: 243 LBS | OXYGEN SATURATION: 95 % | HEIGHT: 72 IN | HEART RATE: 88 BPM | SYSTOLIC BLOOD PRESSURE: 122 MMHG

## 2018-03-23 DIAGNOSIS — E78.2 MIXED HYPERLIPIDEMIA: Chronic | ICD-10-CM

## 2018-03-23 DIAGNOSIS — I10 ESSENTIAL HYPERTENSION: Chronic | ICD-10-CM

## 2018-03-23 DIAGNOSIS — K76.0 FATTY LIVER: Chronic | ICD-10-CM

## 2018-03-23 DIAGNOSIS — E11.8 TYPE 2 DIABETES MELLITUS WITH COMPLICATION, WITHOUT LONG-TERM CURRENT USE OF INSULIN (HCC): Primary | Chronic | ICD-10-CM

## 2018-03-23 DIAGNOSIS — F17.219 CIGARETTE NICOTINE DEPENDENCE WITH NICOTINE-INDUCED DISORDER: Chronic | ICD-10-CM

## 2018-03-23 DIAGNOSIS — E66.9 OBESITY (BMI 30-39.9): Chronic | ICD-10-CM

## 2018-03-23 PROCEDURE — 99214 OFFICE O/P EST MOD 30 MIN: CPT | Performed by: INTERNAL MEDICINE

## 2018-03-23 NOTE — ASSESSMENT & PLAN NOTE
Check A1c. If uncontrolled consider adding SGLT-2 inhibitor.   Continue Janumet XR 50/1000 two daily for now.  Improve diet, weight loss.

## 2018-03-23 NOTE — ASSESSMENT & PLAN NOTE
This is a new problem to this examiner. Lab workup was negative. US fatty liver. Follow labs. Consider bx if worsening levels. Weight loss, avoidance of alcohol, controlling diabetes better along with cholesterol discussed with patient.

## 2018-03-23 NOTE — PROGRESS NOTES
"[facility] [place] [facility]  FLORIDALMA CARO M.D.      Emil Verasginette Watts / 52 y.o. / male  03/23/2018      MEDICATIONS  Current Outpatient Prescriptions   Medication Sig Dispense Refill   • amlodipine-olmesartan (CHANA) 5-20 MG per tablet Take 1 tablet by mouth Daily. 90 tablet 0   • atorvastatin (LIPITOR) 10 MG tablet Take 1 tablet by mouth Daily. 90 tablet 0   • Blood Glucose Monitoring Suppl (TEQUILA CONTOUR NEXT EZ) W/DEVICE kit Use to test blood sugar daily. 1 kit 0   • glucose blood test strip Test one time daily before breakfast 100 each 0   • hydrochlorothiazide (HYDRODIURIL) 25 MG tablet TAKE 1 TABLET BY MOUTH DAILY 30 tablet 3   • Lancet Devices (ACCU-CHEK SOFTCLIX) lancets Test one time daily 100 each 0   • SITagliptin-MetFORMIN HCl ER (JANUMET XR)  MG tablet Take 2 tablets by mouth Daily. 60 tablet 2     No current facility-administered medications for this visit.          VITALS    Visit Vitals  /82   Pulse 88   Temp 97.9 °F (36.6 °C)   Ht 182.9 cm (72.01\")   Wt 110 kg (243 lb)   SpO2 95%   BMI 32.95 kg/m²       BP Readings from Last 3 Encounters:   03/23/18 122/82   01/19/18 140/92   05/19/17 122/80     Wt Readings from Last 3 Encounters:   03/23/18 110 kg (243 lb)   01/19/18 109 kg (241 lb)   05/19/17 108 kg (238 lb)      Body mass index is 32.95 kg/m².    CC:  Main reason(s) for today's visit: Follow-up for diabetes and related medical problems    HPI:     Has fatty liver noted on liver US. Other hepatitis labs negative.    Chronic type 2 diabetes:  Home blood sugar levels: does not check, has no significant low sugar symptoms/problems. Current therapy: Janumet.  Most recent relevant labs:   Lab Results   Component Value Date    HGBA1C 7.20 (H) 01/19/2018    HGBA1C 6.85 (H) 05/19/2017    HGBA1C 6.50 (H) 01/20/2017    CREATININE 0.87 01/19/2018     (H) 01/19/2018    MICROALBUR <3.0 05/19/2017     Chronic essential hypertension:  Since prior visit: compliant with medication(s), checks blood pressure " regularly and denies significant problems with medication(s).  Most recent in-office blood pressure readings:   BP Readings from Last 3 Encounters:   03/23/18 122/82   01/19/18 140/92   05/19/17 122/80     Chronic hyperlipidemia:  Current therapy include atorvastatin, denies problems with medication.    Most recent labs:   Lab Results   Component Value Date     (H) 01/19/2018    LDL 81 01/20/2017    HDL 39 (L) 01/19/2018    HDL 41 01/20/2017    TRIG 365 (H) 01/19/2018    CHOLHDLRATIO 6.10 01/19/2018     Obesity: Weight has been increasing since last visit, has made slight dietary improvement, has made significant changes to physical activity level.  Weight loss efforts: lower calorie diet, low carb/low fat diet.  Current Body mass index is 32.95 kg/m².   Wt Readings from Last 3 Encounters:   03/23/18 110 kg (243 lb)   01/19/18 109 kg (241 lb)   05/19/17 108 kg (238 lb)         Patient Care Team:  Al Francisco MD as PCP - General (Internal Medicine)  ____________________________________________________________________    ASSESSMENT & PLAN:    Problem List Items Addressed This Visit        High    DM type 2 (diabetes mellitus, type 2) - Primary (Chronic)    Current Assessment & Plan     Check A1c. If uncontrolled consider adding SGLT-2 inhibitor.   Continue Janumet XR 50/1000 two daily for now.  Improve diet, weight loss.          Relevant Medications    glucose blood test strip    Lancet Devices (ACCU-CHEK SOFTCLIX) lancets    Blood Glucose Monitoring Suppl (TEQUILA CONTOUR NEXT EZ) W/DEVICE kit    SITagliptin-MetFORMIN HCl ER (JANUMET XR)  MG tablet    Other Relevant Orders    Hemoglobin A1c    Comprehensive Metabolic Panel    Fatty liver (Chronic)    Overview     Fatty liver on US. Other liver labs negative.          Current Assessment & Plan     This is a new problem to this examiner. Lab workup was negative. US fatty liver. Follow labs. Consider bx if worsening levels. Weight loss, avoidance of  alcohol, controlling diabetes better along with cholesterol discussed with patient.             Medium    HTN (hypertension) (Chronic)    Overview     Stable. Continue amlodipine/olmesartan 5/20 and hctz 25 mg daily.         Relevant Medications    amlodipine-olmesartan (CHANA) 5-20 MG per tablet    hydrochlorothiazide (HYDRODIURIL) 25 MG tablet    Hyperlipidemia (Chronic)    Overview     Stable. Continue atorvastatin.          Relevant Medications    atorvastatin (LIPITOR) 10 MG tablet    Other Relevant Orders    Lipid Panel With / Chol / HDL Ratio    Obesity (BMI 30-39.9) (Chronic)    Overview     Maintain a low sugar/starch/carbohydrate diet and exercise regularly. Weight loss advised.              Low    Cigarette nicotine dependence with nicotine-induced disorder (Chronic)    Overview     X 30 yrs         Current Assessment & Plan     Advised to quit smoking.            Other Visit Diagnoses    None.       Orders Placed This Encounter   Procedures   • Hemoglobin A1c   • Lipid Panel With / Chol / HDL Ratio   • Comprehensive Metabolic Panel       Summary/Discussion:      Return in about 3 months (around 6/23/2018) for Diabetes and co-morbid conditions.    Future Appointments  Date Time Provider Department Center   6/22/2018 11:20 AM Al Francisco MD [facility] None     ____________________________________________________________________    REVIEW OF SYSTEMS    Review of Systems  As noted per HPI  Constitutional neg  Resp neg  CV neg  GI neg      PHYSICAL EXAMINATION    Physical Exam  Constitutional  No distress, obese  Cardiovascular Rate  normal . Rhythm: regular . Heart sounds:  normal  Pulmonary/Chest  Effort normal. Breath sounds:  normal  Psychiatric  Alert. Judgment and thought content normal. Mood normal    REVIEWED DATA:    Labs:   Lab Results   Component Value Date     01/19/2018    K 4.2 01/19/2018    AST 82 (H) 01/19/2018     (H) 01/19/2018    BUN 13 01/19/2018    CREATININE 0.87 01/19/2018     CREATININE 0.98 01/20/2017    EGFRIFNONA 93 01/19/2018    EGFRIFAFRI 113 01/19/2018       Lab Results   Component Value Date    HGBA1C 7.20 (H) 01/19/2018    HGBA1C 6.85 (H) 05/19/2017    HGBA1C 6.50 (H) 01/20/2017    MICROALBUR <3.0 05/19/2017       Lab Results   Component Value Date     (H) 01/19/2018    LDL 81 01/20/2017    HDL 39 (L) 01/19/2018    HDL 41 01/20/2017    TRIG 365 (H) 01/19/2018    CHOLHDLRATIO 6.10 01/19/2018       Lab Results   Component Value Date    TSH 2.940 01/19/2018    FREET4 1.24 01/19/2018          No visits with results within 2 Month(s) from this visit.   Latest known visit with results is:   Office Visit on 01/19/2018   Component Date Value Ref Range Status   • Hemoglobin A1C 01/19/2018 7.20* 4.80 - 5.60 % Final    Comment: Hemoglobin A1C Ranges:  Increased Risk for Diabetes  5.7% to 6.4%  Diabetes                     >= 6.5%  Diabetic Goal                < 7.0%     • Total Cholesterol 01/19/2018 238* 0 - 200 mg/dL Final   • Triglycerides 01/19/2018 365* 0 - 150 mg/dL Final   • HDL Cholesterol 01/19/2018 39* 40 - 60 mg/dL Final   • VLDL Cholesterol 01/19/2018 73* 5 - 40 mg/dL Final   • LDL Cholesterol  01/19/2018 126* 0 - 100 mg/dL Final   • Chol/HDL Ratio 01/19/2018 6.10   Final   • Glucose 01/19/2018 130* 65 - 99 mg/dL Final   • BUN 01/19/2018 13  6 - 20 mg/dL Final   • Creatinine 01/19/2018 0.87  0.76 - 1.27 mg/dL Final   • eGFR Non  Am 01/19/2018 93  >60 mL/min/1.73 Final   • eGFR African Am 01/19/2018 113  >60 mL/min/1.73 Final   • BUN/Creatinine Ratio 01/19/2018 14.9  7.0 - 25.0 Final   • Sodium 01/19/2018 138  136 - 145 mmol/L Final   • Potassium 01/19/2018 4.2  3.5 - 5.2 mmol/L Final   • Chloride 01/19/2018 97* 98 - 107 mmol/L Final   • Total CO2 01/19/2018 22.9  22.0 - 29.0 mmol/L Final   • Calcium 01/19/2018 9.8  8.6 - 10.5 mg/dL Final   • Total Protein 01/19/2018 7.6  6.0 - 8.5 g/dL Final   • Albumin 01/19/2018 4.90  3.50 - 5.20 g/dL Final   • Globulin  01/19/2018 2.7  gm/dL Final   • A/G Ratio 01/19/2018 1.8  g/dL Final   • Total Bilirubin 01/19/2018 0.7  0.1 - 1.2 mg/dL Final   • Alkaline Phosphatase 01/19/2018 96  39 - 117 U/L Final   • AST (SGOT) 01/19/2018 82* 1 - 40 U/L Final   • ALT (SGPT) 01/19/2018 108* 1 - 41 U/L Final   • TSH 01/19/2018 2.940  0.270 - 4.200 mIU/mL Final   • Free T4 01/19/2018 1.24  0.93 - 1.70 ng/dL Final   • Ceruloplasmin 02/14/2018 23.2  16.0 - 31.0 mg/dL Final   • Ferritin 02/14/2018 318.00  30.00 - 400.00 ng/mL Final   • Iron 02/14/2018 109  ug/dL Final    Comment: Reference Range:  >=10y: 61 - 157     • Transferrin % 02/14/2018 27  % Saturation Final    Comment: Reference Range:  Children and Adults: 15 - 55     • Transferrin 02/14/2018 290  mg/dL Final    Comment: Reference Range:  Children and Adults:  200 - 370     • Hep A IgM 02/14/2018 Negative  Negative Final   • Hep C Virus Ab 02/14/2018 0.1  0.0 - 0.9 s/co ratio Final    Comment:                                   Negative:     < 0.8                               Indeterminate: 0.8 - 0.9                                    Positive:     > 0.9   The CDC recommends that a positive HCV antibody result   be followed up with a HCV Nucleic Acid Amplification   test (209282).     • Hepatitis B Surface Ag 02/14/2018 Negative  Negative Final   • Hep B E Ag 02/14/2018 Negative  Negative Final   • Hep B Core IgM 02/14/2018 Negative  Negative Final   • Hep B Core Total Ab 02/14/2018 Negative  Negative Final   • Hep B E Ab 02/14/2018 Negative  Negative Final   • Hep B S Ab 02/14/2018 Non Reactive   Final    Comment:               Non Reactive: Inconsistent with immunity,                              less than 10 mIU/mL                Reactive:     Consistent with immunity,                              greater than 9.9 mIU/mL     • Hepatitis C Ab 02/14/2018 0.1  0.0 - 0.9 s/co ratio Final   • Hemochromatosis Gene 02/14/2018 Comment   Final    Comment: NO MUTATION  IDENTIFIED  Interpretation:  This patient's sample was analyzed for the hereditary  hemochromatosis (HH) mutations C282Y, H63D, S65C. No  mutation was identified. The mutations analyzed by [facility]  are most common in the  population, and up to 90%  of affected Caucasians will have a positive test result.  Because this panel does not identify rare HH mutations or  HH mutations found in other ethnic groups, there are a  small number of people who may have a negative test but may  actually be affected. The diagnosis of HH should include  clinical findings and other test results such as  transferrin-iron saturation and/or serum ferritin studies  and/or liver biopsy.  If this patient has a history of HH,  in many cases a specific carrier risk can be determined  based on this negative result.  Methodology:  DNA Analysis of the HFE gene was performed by PCR  amplification followed by restriction enzyme digestion  analyses.  Reference:  Orlando JS and Enrico BURCH. (2000). Gen                           et Test 4:.  Eliecer ALCARAZ et al. (1999). AM J Prev Med 16:134-140.  Danica KOLB (2002). Lancet 360(1247):1674-25.  Abena Capps et al. (2002). Blood Cells, Molecules. and  Diseases.  29(3):418-432.  Jose ROBERTS et al. (2003). Jody Med. 5(1):1-8.  Eliecer ALCARAZ et al. (2003). Jody Med. 5(4):304-10.  This test was developed and its performance characteristics determined  by [facility]. It has not been cleared or approved by the Food and Drug  Administration.  Genetic counselors are available for health care providers to discuss  results at [phone].  Lynnette Small, PhD, FAC  Daja Dalal, PhD, FACMG  Teri Starr, PhD, FACMG  Radha Villalba M.S., PhD, FACMG  Na Rodrigues, PhD, FACMG  Sapna De, PhD, FAC  Wes Banda, PhD, FACMG     • Mitochondrial Ab 02/14/2018 <20.0  0.0 - 20.0 Units Final    Comment:                                 Negative    0.0 - 20.0                                   Equivocal  20.1 - 24.9                                  Positive         >24.9  Mitochondrial (M2) Antibodies are found in 90-96% of  patients with primary biliary cirrhosis.     • Smooth Muscle Ab 02/14/2018 13  0 - 19 Units Final    Comment:                  Negative                     0 - 19                   Weak positive               20 - 30                   Moderate to strong positive     >30   Actin Antibodies are found in 52-85% of patients with   autoimmune hepatitis or chronic active hepatitis and   in 22% of patients with primary biliary cirrhosis.     • SARAVANAN Direct 02/14/2018 Negative  Negative Final   • Comment 02/14/2018 Comment   Final    Comment: Non reactive HCV antibody screen is consistent with no HCV infection,  unless recent infection is suspected or other evidence exists to  indicate HCV infection.     • TIBC 02/14/2018 443  mcg/dL Final   • UIBC 02/14/2018 337  mcg/dL Final   • Iron 02/14/2018 106  59 - 158 mcg/dL Final   • Iron Saturation 02/14/2018 24  20 - 50 % Final         Imaging:   ULTRASOUND LIVER-     CLINICAL INFORMATION:  Elevated liver enzymes.     FINDINGS: Diffusely increased hepatic echotexture with portions of the  liver difficult to penetrate and visualize. No cystic lesion identified.  Adjacent to the gallbladder fossa there is a small area of typical fatty  sparing.     The gallbladder is collapsed, no gross gallstones are identified. No  biliary duct dilatation. The CBD is 4 mm.     The right kidney measures 12.6 cm in length. Kidney is normal in  appearance, no mass calculus or obstructive uropathy. The pancreas is  obscured due to gas within the overlying stomach.     CONCLUSION:  1. Hepatic echotexture consistent with diffuse fatty infiltration with  what appears to be a small area of fatty sparing along the gallbladder  fossa.  2. The gallbladder is collapsed, no biliary duct dilatation.  3. Pancreas obscured due to gas within the overlying stomach.     This report  was finalized on 2018      Medical Tests:        Summary of old records / correspondence / consultant report:        Request outside records:        ALLERGIES  No Known Allergies     PFSH:     The following portions of the patient's history were reviewed and updated as appropriate: Allergies / Current Medications / Past Medical History / Surgical History / Social History / Family History    PROBLEM LIST   Patient Active Problem List   Diagnosis   • HTN (hypertension)   • Hyperlipidemia   • Cigarette nicotine dependence with nicotine-induced disorder   • Obesity (BMI 30-39.9)   • DM type 2 (diabetes mellitus, type 2)   • Fatty liver       PAST MEDICAL HISTORY  Past Medical History:   Diagnosis Date   • Diabetes mellitus    • HTN (hypertension)    • Hyperlipidemia        SURGICAL HISTORY  Past Surgical History:   Procedure Laterality Date   • APPENDECTOMY         SOCIAL HISTORY  Social History     Social History   • Marital status:    • Number of children: 3     Occupational History   • Front director      Social History Main Topics   • Smoking status: Current Every Day Smoker     Packs/day: 0.50     Years: 30.00   • Smokeless tobacco: Never Used      Comment: advised to quit, discussed smoking cessation modalities   • Alcohol use 1.8 - 3.0 oz/week     3 - 5 Standard drinks or equivalent per week   • Drug use: Unknown     Other Topics Concern   • Not on file       FAMILY HISTORY  Family History   Problem Relation Age of Onset   • Pancreatic cancer Father       at 76   • No Known Problems Brother          **Regino Disclaimer:   Much of this encounter note is an electronic transcription/translation of spoken language to printed text. The electronic translation of spoken language may permit erroneous, or at times, nonsensical words or phrases to be inadvertently transcribed. Although I have reviewed the note for such errors, some may still exist.

## 2018-03-24 LAB
ALBUMIN SERPL-MCNC: 4.8 G/DL (ref 3.5–5.2)
ALBUMIN/GLOB SERPL: 1.9 G/DL
ALP SERPL-CCNC: 76 U/L (ref 39–117)
ALT SERPL-CCNC: 91 U/L (ref 1–41)
AST SERPL-CCNC: 70 U/L (ref 1–40)
BILIRUB SERPL-MCNC: 0.6 MG/DL (ref 0.1–1.2)
BUN SERPL-MCNC: 13 MG/DL (ref 6–20)
BUN/CREAT SERPL: 15.1 (ref 7–25)
CALCIUM SERPL-MCNC: 10 MG/DL (ref 8.6–10.5)
CHLORIDE SERPL-SCNC: 101 MMOL/L (ref 98–107)
CHOLEST SERPL-MCNC: 146 MG/DL (ref 0–200)
CHOLEST/HDLC SERPL: 3.24 {RATIO}
CO2 SERPL-SCNC: 24 MMOL/L (ref 22–29)
CREAT SERPL-MCNC: 0.86 MG/DL (ref 0.76–1.27)
GFR SERPLBLD CREATININE-BSD FMLA CKD-EPI: 114 ML/MIN/1.73
GFR SERPLBLD CREATININE-BSD FMLA CKD-EPI: 94 ML/MIN/1.73
GLOBULIN SER CALC-MCNC: 2.5 GM/DL
GLUCOSE SERPL-MCNC: 130 MG/DL (ref 65–99)
HBA1C MFR BLD: 7 % (ref 4.8–5.6)
HDLC SERPL-MCNC: 45 MG/DL (ref 40–60)
LDLC SERPL CALC-MCNC: 62 MG/DL (ref 0–100)
POTASSIUM SERPL-SCNC: 4.5 MMOL/L (ref 3.5–5.2)
PROT SERPL-MCNC: 7.3 G/DL (ref 6–8.5)
SODIUM SERPL-SCNC: 141 MMOL/L (ref 136–145)
TRIGL SERPL-MCNC: 193 MG/DL (ref 0–150)
VLDLC SERPL CALC-MCNC: 38.6 MG/DL (ref 5–40)

## 2018-04-22 DIAGNOSIS — E78.5 HYPERLIPIDEMIA, UNSPECIFIED HYPERLIPIDEMIA TYPE: Chronic | ICD-10-CM

## 2018-04-23 RX ORDER — ATORVASTATIN CALCIUM 10 MG/1
10 TABLET, FILM COATED ORAL DAILY
Qty: 90 TABLET | Refills: 0 | Status: SHIPPED | OUTPATIENT
Start: 2018-04-23 | End: 2018-06-04 | Stop reason: SDUPTHER

## 2018-05-18 DIAGNOSIS — E11.8 TYPE 2 DIABETES MELLITUS WITH COMPLICATION, WITHOUT LONG-TERM CURRENT USE OF INSULIN (HCC): Chronic | ICD-10-CM

## 2018-05-21 RX ORDER — SITAGLIPTIN AND METFORMIN HYDROCHLORIDE 1000; 50 MG/1; MG/1
2 TABLET, FILM COATED, EXTENDED RELEASE ORAL DAILY
Qty: 60 TABLET | Refills: 0 | Status: SHIPPED | OUTPATIENT
Start: 2018-05-21 | End: 2018-06-23 | Stop reason: SDUPTHER

## 2018-06-04 DIAGNOSIS — E78.5 HYPERLIPIDEMIA, UNSPECIFIED HYPERLIPIDEMIA TYPE: Chronic | ICD-10-CM

## 2018-06-05 RX ORDER — ATORVASTATIN CALCIUM 10 MG/1
10 TABLET, FILM COATED ORAL DAILY
Qty: 90 TABLET | Refills: 0 | Status: SHIPPED | OUTPATIENT
Start: 2018-06-05 | End: 2018-10-06 | Stop reason: SDUPTHER

## 2018-06-23 DIAGNOSIS — I10 ESSENTIAL HYPERTENSION: ICD-10-CM

## 2018-06-23 DIAGNOSIS — E11.8 TYPE 2 DIABETES MELLITUS WITH COMPLICATION, WITHOUT LONG-TERM CURRENT USE OF INSULIN (HCC): Chronic | ICD-10-CM

## 2018-06-25 RX ORDER — SITAGLIPTIN AND METFORMIN HYDROCHLORIDE 1000; 50 MG/1; MG/1
2 TABLET, FILM COATED, EXTENDED RELEASE ORAL DAILY
Qty: 60 TABLET | Refills: 0 | Status: SHIPPED | OUTPATIENT
Start: 2018-06-25 | End: 2018-09-11 | Stop reason: SDUPTHER

## 2018-06-25 RX ORDER — AMLODIPINE AND OLMESARTAN MEDOXOMIL 5; 20 MG/1; MG/1
TABLET ORAL
Qty: 90 TABLET | Refills: 0 | Status: SHIPPED | OUTPATIENT
Start: 2018-06-25 | End: 2019-10-31 | Stop reason: SDUPTHER

## 2018-06-25 RX ORDER — HYDROCHLOROTHIAZIDE 25 MG/1
TABLET ORAL
Qty: 30 TABLET | Refills: 0 | Status: SHIPPED | OUTPATIENT
Start: 2018-06-25 | End: 2018-08-17 | Stop reason: SDUPTHER

## 2018-07-10 DIAGNOSIS — I10 ESSENTIAL HYPERTENSION: ICD-10-CM

## 2018-07-10 DIAGNOSIS — E11.8 TYPE 2 DIABETES MELLITUS WITH COMPLICATION, WITHOUT LONG-TERM CURRENT USE OF INSULIN (HCC): Chronic | ICD-10-CM

## 2018-07-10 RX ORDER — SITAGLIPTIN AND METFORMIN HYDROCHLORIDE 1000; 50 MG/1; MG/1
2 TABLET, FILM COATED, EXTENDED RELEASE ORAL DAILY
Qty: 60 TABLET | Refills: 0 | Status: SHIPPED | OUTPATIENT
Start: 2018-07-10 | End: 2018-08-07 | Stop reason: SDUPTHER

## 2018-07-10 RX ORDER — HYDROCHLOROTHIAZIDE 25 MG/1
TABLET ORAL
Qty: 30 TABLET | Refills: 0 | Status: SHIPPED | OUTPATIENT
Start: 2018-07-10 | End: 2018-08-07 | Stop reason: SDUPTHER

## 2018-07-25 DIAGNOSIS — I10 ESSENTIAL HYPERTENSION: ICD-10-CM

## 2018-07-25 RX ORDER — AMLODIPINE AND OLMESARTAN MEDOXOMIL 5; 20 MG/1; MG/1
1 TABLET ORAL DAILY
Qty: 90 TABLET | Refills: 0 | Status: SHIPPED | OUTPATIENT
Start: 2018-07-25 | End: 2018-08-17 | Stop reason: SDUPTHER

## 2018-08-07 DIAGNOSIS — I10 ESSENTIAL HYPERTENSION: ICD-10-CM

## 2018-08-07 DIAGNOSIS — E11.8 TYPE 2 DIABETES MELLITUS WITH COMPLICATION, WITHOUT LONG-TERM CURRENT USE OF INSULIN (HCC): Chronic | ICD-10-CM

## 2018-08-07 RX ORDER — HYDROCHLOROTHIAZIDE 25 MG/1
TABLET ORAL
Qty: 30 TABLET | Refills: 0 | Status: SHIPPED | OUTPATIENT
Start: 2018-08-07 | End: 2018-09-11 | Stop reason: SDUPTHER

## 2018-08-07 RX ORDER — SITAGLIPTIN AND METFORMIN HYDROCHLORIDE 1000; 50 MG/1; MG/1
2 TABLET, FILM COATED, EXTENDED RELEASE ORAL DAILY
Qty: 60 TABLET | Refills: 0 | Status: SHIPPED | OUTPATIENT
Start: 2018-08-07 | End: 2018-08-17 | Stop reason: SDUPTHER

## 2018-08-17 ENCOUNTER — OFFICE VISIT (OUTPATIENT)
Dept: INTERNAL MEDICINE | Age: 53
End: 2018-08-17

## 2018-08-17 VITALS
WEIGHT: 235 LBS | HEIGHT: 72 IN | SYSTOLIC BLOOD PRESSURE: 128 MMHG | OXYGEN SATURATION: 96 % | HEART RATE: 88 BPM | DIASTOLIC BLOOD PRESSURE: 84 MMHG | BODY MASS INDEX: 31.83 KG/M2 | TEMPERATURE: 98.2 F

## 2018-08-17 DIAGNOSIS — E11.8 TYPE 2 DIABETES MELLITUS WITH COMPLICATION, WITHOUT LONG-TERM CURRENT USE OF INSULIN (HCC): Primary | Chronic | ICD-10-CM

## 2018-08-17 DIAGNOSIS — E66.9 OBESITY (BMI 30-39.9): Chronic | ICD-10-CM

## 2018-08-17 DIAGNOSIS — F17.219 CIGARETTE NICOTINE DEPENDENCE WITH NICOTINE-INDUCED DISORDER: Chronic | ICD-10-CM

## 2018-08-17 DIAGNOSIS — E78.2 MIXED HYPERLIPIDEMIA: Chronic | ICD-10-CM

## 2018-08-17 DIAGNOSIS — I10 ESSENTIAL HYPERTENSION: Chronic | ICD-10-CM

## 2018-08-17 DIAGNOSIS — K76.0 FATTY LIVER: Chronic | ICD-10-CM

## 2018-08-17 PROCEDURE — 99214 OFFICE O/P EST MOD 30 MIN: CPT | Performed by: INTERNAL MEDICINE

## 2018-08-17 RX ORDER — BLOOD-GLUCOSE METER
EACH MISCELLANEOUS
Qty: 1 EACH | Refills: 5 | Status: SHIPPED | OUTPATIENT
Start: 2018-08-17

## 2018-08-17 NOTE — ASSESSMENT & PLAN NOTE
Wt Readings from Last 3 Encounters:   08/17/18 107 kg (235 lb)   03/23/18 110 kg (243 lb)   01/19/18 109 kg (241 lb)

## 2018-08-17 NOTE — PROGRESS NOTES
[facility] – [place] [facility]  FLORIDALMA CARO M.D.      Emil Watts / 53 y.o. / male  08/17/2018      ASSESSMENT & PLAN:    Problem List Items Addressed This Visit        High    DM type 2 (diabetes mellitus, type 2) (CMS/HCC) - Primary (Chronic)    Overview     Continue Janumet XR 50/1000 TWO daily.          Relevant Medications    glucose blood test strip    Lancet Devices (ACCU-CHEK SOFTCLIX) lancets    JANUMET XR  MG tablet    Blood Glucose Monitoring Suppl (ONE TOUCH ULTRA 2) w/Device kit    Other Relevant Orders    Hemoglobin A1c    Microalbumin / Creatinine Urine Ratio - Urine, Clean Catch       Medium    HTN (hypertension) (Chronic)    Overview     Continue amlodipine/olmesartan 5/20 and hctz 25 mg daily.         Relevant Medications    amlodipine-olmesartan (CHANA) 5-20 MG per tablet    hydrochlorothiazide (HYDRODIURIL) 25 MG tablet    Hyperlipidemia (Chronic)    Overview     Stable. Continue atorvastatin 10 mg qd.          Relevant Medications    atorvastatin (LIPITOR) 10 MG tablet    Obesity (BMI 30-39.9) (Chronic)    Overview     Maintain a low sugar/starch/carbohydrate diet and exercise regularly. Weight loss advised.           Current Assessment & Plan     Wt Readings from Last 3 Encounters:   08/17/18 107 kg (235 lb)   03/23/18 110 kg (243 lb)   01/19/18 109 kg (241 lb)                Low    Cigarette nicotine dependence with nicotine-induced disorder (Chronic)    Overview     X 30 yrs         Current Assessment & Plan     Advised to quit smoking.          Fatty liver (Chronic)    Overview     Fatty liver on US. Other liver labs negative.              Orders Placed This Encounter   Procedures   • Hemoglobin A1c   • Microalbumin / Creatinine Urine Ratio - Urine, Clean Catch     New Medications Ordered This Visit   Medications   • Blood Glucose Monitoring Suppl (ONE TOUCH ULTRA 2) w/Device kit     Sig: Use as instructed.     Dispense:  1 each     Refill:  5       Summary/Discussion:      Return in about 3  "months (around 11/17/2018) for Diabetes and co-morbid conditions.    ____________________________________________________________________    MEDICATIONS  Current Outpatient Prescriptions   Medication Sig Dispense Refill   • amlodipine-olmesartan (CHANA) 5-20 MG per tablet TAKE ONE TABLET BY MOUTH EVERY DAY 90 tablet 0   • atorvastatin (LIPITOR) 10 MG tablet TAKE 1 TABLET BY MOUTH DAILY 90 tablet 0   • glucose blood test strip Test one time daily before breakfast 100 each 0   • hydrochlorothiazide (HYDRODIURIL) 25 MG tablet TAKE 1 TABLET BY MOUTH DAILY 30 tablet 0   • JANUMET XR  MG tablet TAKE 2 TABLETS BY MOUTH DAILY 60 tablet 0   • Lancet Devices (ACCU-CHEK SOFTCLIX) lancets Test one time daily 100 each 0   • Blood Glucose Monitoring Suppl (ONE TOUCH ULTRA 2) w/Device kit Use as instructed. 1 each 5     No current facility-administered medications for this visit.          VITALS:    Visit Vitals  /84   Pulse 88   Temp 98.2 °F (36.8 °C)   Ht 182.9 cm (72.01\")   Wt 107 kg (235 lb)   SpO2 96%   BMI 31.86 kg/m²       BP Readings from Last 3 Encounters:   08/17/18 128/84   03/23/18 122/82   01/19/18 140/92     Wt Readings from Last 3 Encounters:   08/17/18 107 kg (235 lb)   03/23/18 110 kg (243 lb)   01/19/18 109 kg (241 lb)      Body mass index is 31.86 kg/m².    CC:  Main reason(s) for today's visit: Diabetes      HPI:     Chronic type 2 diabetes:  Home blood sugar levels: does not check, has no significant low sugar symptoms/problems. Current therapy: Janumet XR.  Most recent relevant labs:   Lab Results   Component Value Date    HGBA1C 7.00 (H) 03/23/2018    HGBA1C 7.20 (H) 01/19/2018    HGBA1C 6.85 (H) 05/19/2017    CREATININE 0.86 03/23/2018    LDL 62 03/23/2018    MICROALBUR <3.0 05/19/2017     Chronic essential hypertension:  Since prior visit: compliant with medication(s) and denies significant problems with medication(s).  Most recent in-office blood pressure readings:   BP Readings from Last 3 " Encounters:   08/17/18 128/84   03/23/18 122/82   01/19/18 140/92     Chronic hyperlipidemia:  Current therapy include atorvastatin, denies problems with medication.    Most recent labs:   Lab Results   Component Value Date    LDL 62 03/23/2018     (H) 01/19/2018    LDL 81 01/20/2017    HDL 45 03/23/2018    HDL 39 (L) 01/19/2018    HDL 41 01/20/2017    TRIG 193 (H) 03/23/2018    CHOLHDLRATIO 3.24 03/23/2018    CHOLHDLRATIO 6.10 01/19/2018    CHOLHDLRATIO 4.00 01/20/2017     Obesity: Weight has been decreasing since last visit.  Weight loss efforts: lower calorie diet.  Current Body mass index is 31.86 kg/m².   Wt Readings from Last 3 Encounters:   08/17/18 107 kg (235 lb)   03/23/18 110 kg (243 lb)   01/19/18 109 kg (241 lb)         Patient Care Team:  Al Francisco MD as PCP - General (Internal Medicine)    ____________________________________________________________________    REVIEW OF SYSTEMS    Review of Systems  Constitutional weight loss   Resp neg  CV neg  No myalgia    PHYSICAL EXAMINATION    Physical Exam  Constitutional  No distress, noted weight loss  Cardiovascular Rate  normal . Rhythm: regular . Heart sounds:  Normal  Pulmonary/Chest  Effort normal. Breath sounds:  Normal  Psychiatric  Alert. Judgment and thought content normal. Mood normal    REVIEWED DATA:    Labs:     Lab Results   Component Value Date     03/23/2018    K 4.5 03/23/2018    AST 70 (H) 03/23/2018    ALT 91 (H) 03/23/2018    BUN 13 03/23/2018    CREATININE 0.86 03/23/2018    CREATININE 0.87 01/19/2018    CREATININE 0.98 01/20/2017    EGFRIFNONA 94 03/23/2018    EGFRIFAFRI 114 03/23/2018       Lab Results   Component Value Date    HGBA1C 7.00 (H) 03/23/2018    HGBA1C 7.20 (H) 01/19/2018    HGBA1C 6.85 (H) 05/19/2017    MICROALBUR <3.0 05/19/2017       Lab Results   Component Value Date    LDL 62 03/23/2018     (H) 01/19/2018    LDL 81 01/20/2017    HDL 45 03/23/2018    HDL 39 (L) 01/19/2018    HDL 41 01/20/2017     TRIG 193 (H) 2018    TRIG 365 (H) 2018    TRIG 212 (H) 2017    CHOLHDLRATIO 3.24 2018    CHOLHDLRATIO 6.10 2018    CHOLHDLRATIO 4.00 2017       Lab Results   Component Value Date    TSH 2.940 2018    FREET4 1.24 2018       No results found for: WBC, HGB, PLT    Imaging:         Medical Tests:         Summary of old records / correspondence / consultant report:         Request outside records:         ALLERGIES  No Known Allergies     PFSH:     The following portions of the patient's history were reviewed and updated as appropriate: Allergies / Current Medications / Past Medical History / Surgical History / Social History / Family History    PROBLEM LIST   Patient Active Problem List   Diagnosis   • HTN (hypertension)   • Hyperlipidemia   • Cigarette nicotine dependence with nicotine-induced disorder   • Obesity (BMI 30-39.9)   • DM type 2 (diabetes mellitus, type 2) (CMS/HCC)   • Fatty liver       PAST MEDICAL HISTORY  Past Medical History:   Diagnosis Date   • Diabetes mellitus (CMS/HCC)    • HTN (hypertension)    • Hyperlipidemia        SURGICAL HISTORY  Past Surgical History:   Procedure Laterality Date   • APPENDECTOMY         SOCIAL HISTORY  Social History     Social History   • Marital status:    • Number of children: 3     Occupational History   • Front director      Social History Main Topics   • Smoking status: Current Every Day Smoker     Packs/day: 0.50     Years: 30.00   • Smokeless tobacco: Never Used      Comment: advised to quit, discussed smoking cessation modalities   • Alcohol use 1.8 - 3.0 oz/week     3 - 5 Standard drinks or equivalent per week   • Drug use: Unknown     Other Topics Concern   • Not on file       FAMILY HISTORY  Family History   Problem Relation Age of Onset   • Pancreatic cancer Father          at 76   • No Known Problems Brother          **Regino Disclaimer:   Much of this encounter note is an electronic  transcription/translation of spoken language to printed text. The electronic translation of spoken language may permit erroneous, or at times, nonsensical words or phrases to be inadvertently transcribed. Although I have reviewed the note for such errors, some may still exist.

## 2018-08-18 LAB
ALBUMIN/CREAT UR: <5.9 MG/G CREAT (ref 0–30)
CREAT UR-MCNC: 50.7 MG/DL
HBA1C MFR BLD: 6.77 % (ref 4.8–5.6)
MICROALBUMIN UR-MCNC: <3 UG/ML

## 2018-08-20 ENCOUNTER — TELEPHONE (OUTPATIENT)
Dept: INTERNAL MEDICINE | Age: 53
End: 2018-08-20

## 2018-08-20 NOTE — TELEPHONE ENCOUNTER
ADRIAN for pt results and send results to my chart for review. And If any question or concerns to call to go over results. HALEY

## 2018-08-20 NOTE — TELEPHONE ENCOUNTER
----- Message from Al Francisco MD sent at 8/20/2018  8:11 AM EDT -----  Call patient with his test result(s) and mail the results to him if MyChart is NOT active.    Diabetes is improving but can be better. Continue to work on diet/exercise/wt loss.

## 2018-09-11 DIAGNOSIS — I10 ESSENTIAL HYPERTENSION: ICD-10-CM

## 2018-09-11 DIAGNOSIS — E11.8 TYPE 2 DIABETES MELLITUS WITH COMPLICATION, WITHOUT LONG-TERM CURRENT USE OF INSULIN (HCC): Chronic | ICD-10-CM

## 2018-09-11 RX ORDER — HYDROCHLOROTHIAZIDE 25 MG/1
TABLET ORAL
Qty: 30 TABLET | Refills: 5 | Status: SHIPPED | OUTPATIENT
Start: 2018-09-11 | End: 2019-03-04 | Stop reason: SDUPTHER

## 2018-09-11 RX ORDER — SITAGLIPTIN AND METFORMIN HYDROCHLORIDE 1000; 50 MG/1; MG/1
2 TABLET, FILM COATED, EXTENDED RELEASE ORAL DAILY
Qty: 60 TABLET | Refills: 5 | Status: SHIPPED | OUTPATIENT
Start: 2018-09-11 | End: 2019-03-18 | Stop reason: SDUPTHER

## 2018-10-06 DIAGNOSIS — E78.5 HYPERLIPIDEMIA, UNSPECIFIED HYPERLIPIDEMIA TYPE: Chronic | ICD-10-CM

## 2018-10-08 RX ORDER — ATORVASTATIN CALCIUM 10 MG/1
10 TABLET, FILM COATED ORAL DAILY
Qty: 90 TABLET | Refills: 3 | Status: SHIPPED | OUTPATIENT
Start: 2018-10-08 | End: 2020-09-09 | Stop reason: SDUPTHER

## 2018-10-25 DIAGNOSIS — I10 ESSENTIAL HYPERTENSION: ICD-10-CM

## 2018-10-25 RX ORDER — AMLODIPINE AND OLMESARTAN MEDOXOMIL 5; 20 MG/1; MG/1
1 TABLET ORAL DAILY
Qty: 90 TABLET | Refills: 3 | Status: SHIPPED | OUTPATIENT
Start: 2018-10-25 | End: 2018-11-02 | Stop reason: SDUPTHER

## 2018-11-02 ENCOUNTER — OFFICE VISIT (OUTPATIENT)
Dept: INTERNAL MEDICINE | Age: 53
End: 2018-11-02

## 2018-11-02 VITALS
DIASTOLIC BLOOD PRESSURE: 70 MMHG | SYSTOLIC BLOOD PRESSURE: 110 MMHG | BODY MASS INDEX: 31.83 KG/M2 | OXYGEN SATURATION: 98 % | WEIGHT: 235 LBS | HEIGHT: 72 IN | HEART RATE: 88 BPM | TEMPERATURE: 97.8 F

## 2018-11-02 DIAGNOSIS — E11.8 TYPE 2 DIABETES MELLITUS WITH COMPLICATION, WITHOUT LONG-TERM CURRENT USE OF INSULIN (HCC): Primary | Chronic | ICD-10-CM

## 2018-11-02 DIAGNOSIS — I10 ESSENTIAL HYPERTENSION: Chronic | ICD-10-CM

## 2018-11-02 DIAGNOSIS — E78.2 MIXED HYPERLIPIDEMIA: Chronic | ICD-10-CM

## 2018-11-02 LAB — HBA1C MFR BLD: 6.6 % (ref 4.8–5.6)

## 2018-11-02 PROCEDURE — 99214 OFFICE O/P EST MOD 30 MIN: CPT | Performed by: INTERNAL MEDICINE

## 2018-11-02 NOTE — PROGRESS NOTES
[facility] [place] [facility]  FLORIDALMA CARO M.D.      Emil Watts / 53 y.o. / male  11/02/2018      ASSESSMENT & PLAN:    Problem List Items Addressed This Visit        High    DM type 2 (diabetes mellitus, type 2) (CMS/HCC) - Primary (Chronic)    Overview     Continue Janumet XR 50/1000 TWO daily.          Relevant Medications    glucose blood test strip    Lancet Devices (ACCU-CHEK SOFTCLIX) lancets    Blood Glucose Monitoring Suppl (ONE TOUCH ULTRA 2) w/Device kit    JANUMET XR  MG tablet    Other Relevant Orders    Hemoglobin A1c       Medium    HTN (hypertension) (Chronic)    Overview     Continue amlodipine/olmesartan 5/20 and hctz 25 mg daily.         Relevant Medications    amlodipine-olmesartan (CHANA) 5-20 MG per tablet    hydrochlorothiazide (HYDRODIURIL) 25 MG tablet    Hyperlipidemia (Chronic)    Overview     Stable. Continue atorvastatin 10 mg qd.          Relevant Medications    atorvastatin (LIPITOR) 10 MG tablet        Orders Placed This Encounter   Procedures   • Hemoglobin A1c     No orders of the defined types were placed in this encounter.      Summary/Discussion:  Recommend Shingrix (new shingles vaccine) and Hepatitis A vaccines at the pharmacy.      Return in about 5 months (around 4/2/2019) for Diabetes and co-morbid conditions.  ____________________________________________________________________    MEDICATIONS  Current Outpatient Prescriptions   Medication Sig Dispense Refill   • amlodipine-olmesartan (CHANA) 5-20 MG per tablet TAKE ONE TABLET BY MOUTH EVERY DAY 90 tablet 0   • atorvastatin (LIPITOR) 10 MG tablet TAKE 1 TABLET BY MOUTH DAILY 90 tablet 3   • Blood Glucose Monitoring Suppl (ONE TOUCH ULTRA 2) w/Device kit Use as instructed. 1 each 5   • glucose blood test strip Test one time daily before breakfast 100 each 0   • hydrochlorothiazide (HYDRODIURIL) 25 MG tablet TAKE 1 TABLET BY MOUTH EVERY DAY 30 tablet 5   • JANUMET XR  MG tablet TAKE 2 TABLETS BY MOUTH DAILY 60 tablet 5  "  • Lancet Devices (ACCU-CHEK SOFTCLIX) lancets Test one time daily 100 each 0     No current facility-administered medications for this visit.          VITALS    Visit Vitals  /70   Pulse 88   Temp 97.8 °F (36.6 °C)   Ht 182.9 cm (72.01\")   Wt 107 kg (235 lb)   SpO2 98%   BMI 31.86 kg/m²       BP Readings from Last 3 Encounters:   11/02/18 110/70   08/17/18 128/84   03/23/18 122/82     Wt Readings from Last 3 Encounters:   11/02/18 107 kg (235 lb)   08/17/18 107 kg (235 lb)   03/23/18 110 kg (243 lb)      Body mass index is 31.86 kg/m².    CC:  Main reason(s) for today's visit: Follow-up for diabetes and related medical problems    HPI:     Chronic type 2 diabetes:  Home blood sugar levels: about the same as before, has no significant low sugar symptoms/problems. Current therapy: Janumet XR.  Most recent relevant labs:   Lab Results   Component Value Date    HGBA1C 6.77 (H) 08/17/2018    HGBA1C 7.00 (H) 03/23/2018    HGBA1C 7.20 (H) 01/19/2018    CREATININE 0.86 03/23/2018    LDL 62 03/23/2018    MICROALBUR <3.0 08/17/2018     Chronic essential hypertension:  Since prior visit: compliant with medication(s) and denies significant problems with medication(s).  Most recent in-office blood pressure readings:   BP Readings from Last 3 Encounters:   11/02/18 110/70   08/17/18 128/84   03/23/18 122/82     Chronic hyperlipidemia:  Current therapy include atorvastatin, denies problems with medication.    Most recent labs:   Lab Results   Component Value Date    LDL 62 03/23/2018     (H) 01/19/2018    LDL 81 01/20/2017    HDL 45 03/23/2018    HDL 39 (L) 01/19/2018    TRIG 193 (H) 03/23/2018    CHOLHDLRATIO 3.24 03/23/2018         Patient Care Team:  Al Francisco MD as PCP - General (Internal Medicine)  ____________________________________________________________________    REVIEW OF SYSTEMS    Review of Systems  As noted per HPI  Constitutional neg  Resp neg  CV neg      PHYSICAL EXAMINATION    Physical " Exam  Constitutional  No distress  Cardiovascular Rate  normal . Rhythm: regular . Heart sounds:  normal  Pulmonary/Chest  Effort normal. Breath sounds:  normal  Psychiatric  Alert. Judgment and thought content normal. Mood normal    REVIEWED DATA:    Labs:   Lab Results   Component Value Date     03/23/2018    K 4.5 03/23/2018    AST 70 (H) 03/23/2018    ALT 91 (H) 03/23/2018    BUN 13 03/23/2018    CREATININE 0.86 03/23/2018    CREATININE 0.87 01/19/2018    CREATININE 0.98 01/20/2017    EGFRIFNONA 94 03/23/2018    EGFRIFAFRI 114 03/23/2018       Lab Results   Component Value Date    HGBA1C 6.77 (H) 08/17/2018    HGBA1C 7.00 (H) 03/23/2018    HGBA1C 7.20 (H) 01/19/2018    MICROALBUR <3.0 08/17/2018       Lab Results   Component Value Date    LDL 62 03/23/2018     (H) 01/19/2018    LDL 81 01/20/2017    HDL 45 03/23/2018    HDL 39 (L) 01/19/2018    TRIG 193 (H) 03/23/2018    CHOLHDLRATIO 3.24 03/23/2018       Lab Results   Component Value Date    TSH 2.940 01/19/2018    FREET4 1.24 01/19/2018        No results found for: WBC, HGB, PLT     Imaging:        Medical Tests:        Summary of old records / correspondence / consultant report:        Request outside records:        ALLERGIES  No Known Allergies     PFSH:     The following portions of the patient's history were reviewed and updated as appropriate: Allergies / Current Medications / Past Medical History / Surgical History / Social History / Family History    PROBLEM LIST   Patient Active Problem List   Diagnosis   • HTN (hypertension)   • Hyperlipidemia   • Cigarette nicotine dependence with nicotine-induced disorder   • Obesity (BMI 30-39.9)   • DM type 2 (diabetes mellitus, type 2) (CMS/HCC)   • Fatty liver       PAST MEDICAL HISTORY  Past Medical History:   Diagnosis Date   • Diabetes mellitus (CMS/HCC)    • HTN (hypertension)    • Hyperlipidemia        SURGICAL HISTORY  Past Surgical History:   Procedure Laterality Date   • APPENDECTOMY          SOCIAL HISTORY  Social History     Social History   • Marital status:    • Number of children: 3     Occupational History   • Front director      Social History Main Topics   • Smoking status: Current Every Day Smoker     Packs/day: 0.50     Years: 30.00   • Smokeless tobacco: Never Used      Comment: advised to quit, discussed smoking cessation modalities   • Alcohol use 1.8 - 3.0 oz/week     3 - 5 Standard drinks or equivalent per week   • Drug use: Unknown     Other Topics Concern   • Not on file       FAMILY HISTORY  Family History   Problem Relation Age of Onset   • Pancreatic cancer Father          at 76   • No Known Problems Brother          **Regino Disclaimer:   Much of this encounter note is an electronic transcription/translation of spoken language to printed text. The electronic translation of spoken language may permit erroneous, or at times, nonsensical words or phrases to be inadvertently transcribed. Although I have reviewed the note for such errors, some may still exist.

## 2019-03-04 DIAGNOSIS — I10 ESSENTIAL HYPERTENSION: ICD-10-CM

## 2019-03-06 RX ORDER — HYDROCHLOROTHIAZIDE 25 MG/1
TABLET ORAL
Qty: 30 TABLET | Refills: 0 | Status: SHIPPED | OUTPATIENT
Start: 2019-03-06 | End: 2019-04-06 | Stop reason: SDUPTHER

## 2019-03-18 DIAGNOSIS — E11.8 TYPE 2 DIABETES MELLITUS WITH COMPLICATION, WITHOUT LONG-TERM CURRENT USE OF INSULIN (HCC): Chronic | ICD-10-CM

## 2019-03-18 RX ORDER — SITAGLIPTIN AND METFORMIN HYDROCHLORIDE 1000; 50 MG/1; MG/1
2 TABLET, FILM COATED, EXTENDED RELEASE ORAL DAILY
Qty: 60 TABLET | Refills: 5 | Status: SHIPPED | OUTPATIENT
Start: 2019-03-18 | End: 2019-09-29 | Stop reason: SDUPTHER

## 2019-04-06 DIAGNOSIS — I10 ESSENTIAL HYPERTENSION: ICD-10-CM

## 2019-04-08 RX ORDER — HYDROCHLOROTHIAZIDE 25 MG/1
TABLET ORAL
Qty: 30 TABLET | Refills: 5 | Status: SHIPPED | OUTPATIENT
Start: 2019-04-08 | End: 2019-10-16 | Stop reason: SDUPTHER

## 2019-05-17 ENCOUNTER — APPOINTMENT (OUTPATIENT)
Dept: LAB | Facility: HOSPITAL | Age: 54
End: 2019-05-17

## 2019-05-17 ENCOUNTER — OFFICE VISIT (OUTPATIENT)
Dept: INTERNAL MEDICINE | Age: 54
End: 2019-05-17

## 2019-05-17 VITALS
SYSTOLIC BLOOD PRESSURE: 124 MMHG | BODY MASS INDEX: 31.29 KG/M2 | HEART RATE: 82 BPM | DIASTOLIC BLOOD PRESSURE: 70 MMHG | TEMPERATURE: 97.8 F | OXYGEN SATURATION: 95 % | HEIGHT: 72 IN | WEIGHT: 231 LBS

## 2019-05-17 DIAGNOSIS — K76.0 FATTY LIVER: Primary | Chronic | ICD-10-CM

## 2019-05-17 DIAGNOSIS — Z23 NEED FOR HEPATITIS A IMMUNIZATION: ICD-10-CM

## 2019-05-17 DIAGNOSIS — E78.2 MIXED HYPERLIPIDEMIA: Chronic | ICD-10-CM

## 2019-05-17 DIAGNOSIS — E11.8 TYPE 2 DIABETES MELLITUS WITH COMPLICATION, WITHOUT LONG-TERM CURRENT USE OF INSULIN (HCC): Primary | Chronic | ICD-10-CM

## 2019-05-17 DIAGNOSIS — R79.89 ELEVATED LFTS: ICD-10-CM

## 2019-05-17 DIAGNOSIS — Z12.5 ENCOUNTER FOR SCREENING FOR MALIGNANT NEOPLASM OF PROSTATE: ICD-10-CM

## 2019-05-17 DIAGNOSIS — I10 ESSENTIAL HYPERTENSION: Chronic | ICD-10-CM

## 2019-05-17 LAB
ALBUMIN SERPL-MCNC: 4.7 G/DL (ref 3.5–5.2)
ALBUMIN/GLOB SERPL: 1.5 G/DL
ALP SERPL-CCNC: 81 U/L (ref 39–117)
ALT SERPL W P-5'-P-CCNC: 124 U/L (ref 1–41)
ANION GAP SERPL CALCULATED.3IONS-SCNC: 15.6 MMOL/L
AST SERPL-CCNC: 75 U/L (ref 1–40)
BILIRUB SERPL-MCNC: 0.5 MG/DL (ref 0.2–1.2)
BUN BLD-MCNC: 20 MG/DL (ref 6–20)
BUN/CREAT SERPL: 21.1 (ref 7–25)
CALCIUM SPEC-SCNC: 10.2 MG/DL (ref 8.6–10.5)
CHLORIDE SERPL-SCNC: 102 MMOL/L (ref 98–107)
CHOLEST SERPL-MCNC: 137 MG/DL (ref 0–200)
CO2 SERPL-SCNC: 19.4 MMOL/L (ref 22–29)
CREAT BLD-MCNC: 0.95 MG/DL (ref 0.76–1.27)
GFR SERPL CREATININE-BSD FRML MDRD: 83 ML/MIN/1.73
GLOBULIN UR ELPH-MCNC: 3.1 GM/DL
GLUCOSE BLD-MCNC: 124 MG/DL (ref 65–99)
HBA1C MFR BLD: 6.67 % (ref 4.8–5.6)
HDLC SERPL QL: 3.04
HDLC SERPL-MCNC: 45 MG/DL (ref 40–60)
LDLC SERPL CALC-MCNC: 69 MG/DL (ref 0–100)
POTASSIUM BLD-SCNC: 4.1 MMOL/L (ref 3.5–5.2)
PROT SERPL-MCNC: 7.8 G/DL (ref 6–8.5)
PSA SERPL-MCNC: 3.01 NG/ML (ref 0–4)
SODIUM BLD-SCNC: 137 MMOL/L (ref 136–145)
TRIGL SERPL-MCNC: 116 MG/DL (ref 0–150)
VLDLC SERPL-MCNC: 23.2 MG/DL (ref 5–40)

## 2019-05-17 PROCEDURE — 90632 HEPA VACCINE ADULT IM: CPT | Performed by: INTERNAL MEDICINE

## 2019-05-17 PROCEDURE — 99214 OFFICE O/P EST MOD 30 MIN: CPT | Performed by: INTERNAL MEDICINE

## 2019-05-17 PROCEDURE — 90471 IMMUNIZATION ADMIN: CPT | Performed by: INTERNAL MEDICINE

## 2019-05-17 PROCEDURE — 83036 HEMOGLOBIN GLYCOSYLATED A1C: CPT | Performed by: INTERNAL MEDICINE

## 2019-05-17 PROCEDURE — 80053 COMPREHEN METABOLIC PANEL: CPT | Performed by: INTERNAL MEDICINE

## 2019-05-17 PROCEDURE — 36415 COLL VENOUS BLD VENIPUNCTURE: CPT | Performed by: INTERNAL MEDICINE

## 2019-05-17 PROCEDURE — 80061 LIPID PANEL: CPT | Performed by: INTERNAL MEDICINE

## 2019-05-17 PROCEDURE — G0103 PSA SCREENING: HCPCS | Performed by: INTERNAL MEDICINE

## 2019-05-17 NOTE — PROGRESS NOTES
Call patient with his test result(s) and mail the results to him if MyChart is NOT active.    1) ALT liver enzyme is higher indicating inflammation of the liver.   -Will need to hold atorvastatin for time being in case this may be exacerbating his fatty liver problem.   -Will check Fibrosure test for cirrhosis of liver. I will place this order but schedule him for the lab this coming week.   -Need to avoid all alcohol, Tylenol (acetaminophen).     2) Diabetes and cholesterol are otherwise stable.

## 2019-05-17 NOTE — PROGRESS NOTES
[facility]  FLORIDALMA CARO M.D.      Emil Watts / 54 y.o. / male  05/17/2019      ASSESSMENT & PLAN:    Problem List Items Addressed This Visit        High    DM type 2 (diabetes mellitus, type 2) (CMS/HCC) - Primary (Chronic)    Overview     Continue Janumet XR 50/1000 TWO daily.          Relevant Medications    glucose blood test strip    Lancet Devices (ACCU-CHEK SOFTCLIX) lancets    Blood Glucose Monitoring Suppl (ONE TOUCH ULTRA 2) w/Device kit    JANUMET XR  MG tablet    Other Relevant Orders    Hemoglobin A1c    Comprehensive Metabolic Panel       Medium    HTN (hypertension) (Chronic)    Overview     Continue amlodipine/olmesartan 5/20 and hctz 25 mg daily.         Relevant Medications    amlodipine-olmesartan (CHANA) 5-20 MG per tablet    hydrochlorothiazide (HYDRODIURIL) 25 MG tablet    Hyperlipidemia (Chronic)    Overview     Stable. Continue atorvastatin 10 mg qd.          Relevant Medications    atorvastatin (LIPITOR) 10 MG tablet    Other Relevant Orders    Lipid Panel With / Chol / HDL Ratio      Other Visit Diagnoses     Encounter for screening for malignant neoplasm of prostate        Relevant Orders    PSA Screen        Orders Placed This Encounter   Procedures   • Hepatitis A Vaccine Adult IM   • Hemoglobin A1c   • Lipid Panel With / Chol / HDL Ratio   • Comprehensive Metabolic Panel   • PSA Screen     No orders of the defined types were placed in this encounter.      Summary/Discussion:      Next Appointment with me: Visit date not found    Return in about 6 months (around 11/17/2019) for Diabetes and co-morbid conditions.  ____________________________________________________________________    MEDICATIONS  Current Outpatient Medications   Medication Sig Dispense Refill   • amlodipine-olmesartan (CHANA) 5-20 MG per tablet TAKE ONE TABLET BY MOUTH EVERY DAY 90 tablet 0   • atorvastatin (LIPITOR) 10 MG tablet TAKE 1 TABLET BY MOUTH DAILY 90 tablet 3   • Blood Glucose Monitoring  "Suppl (ONE TOUCH ULTRA 2) w/Device kit Use as instructed. 1 each 5   • glucose blood test strip Test one time daily before breakfast 100 each 0   • hydrochlorothiazide (HYDRODIURIL) 25 MG tablet TAKE 1 TABLET BY MOUTH EVERY DAY 30 tablet 5   • JANUMET XR  MG tablet TAKE 2 TABLETS BY MOUTH DAILY 60 tablet 5   • Lancet Devices (ACCU-CHEK SOFTCLIX) lancets Test one time daily 100 each 0     No current facility-administered medications for this visit.        VITALS    Visit Vitals  /70   Pulse 82   Temp 97.8 °F (36.6 °C)   Ht 182.9 cm (72.01\")   Wt 105 kg (231 lb)   SpO2 95%   BMI 31.32 kg/m²       BP Readings from Last 3 Encounters:   05/17/19 124/70   11/02/18 110/70   08/17/18 128/84     Wt Readings from Last 3 Encounters:   05/17/19 105 kg (231 lb)   11/02/18 107 kg (235 lb)   08/17/18 107 kg (235 lb)      Body mass index is 31.32 kg/m².    CC:  Main reason(s) for today's visit: Follow-up for diabetes and related medical problems    HPI:     Chronic type 2 diabetes:  Diabetic complications: none identified. Current therapy: Janumet XR.  Most recent change to treatment plan: made significant improvement in diet.  Home blood sugar levels: better than before, has no significant low sugar symptoms/problems.   Most recent relevant labs:   Lab Results   Component Value Date    HGBA1C 6.60 (H) 11/02/2018    HGBA1C 6.77 (H) 08/17/2018    HGBA1C 7.00 (H) 03/23/2018    CREATININE 0.86 03/23/2018    LDL 62 03/23/2018    MICROALBUR <3.0 08/17/2018       Chronic essential hypertension:  Since prior visit: compliant with medication(s) and denies significant problems with medication(s).  Most recent in-office blood pressure readings:   BP Readings from Last 3 Encounters:   05/17/19 124/70   11/02/18 110/70   08/17/18 128/84       Chronic hyperlipidemia:  Current therapy include atorvastatin, denies problems with medication.    Most recent labs:   Lab Results   Component Value Date    LDL 62 03/23/2018     (H) " 01/19/2018    LDL 81 01/20/2017    HDL 45 03/23/2018    HDL 39 (L) 01/19/2018    HDL 41 01/20/2017    TRIG 193 (H) 03/23/2018    CHOLHDLRATIO 3.24 03/23/2018    CHOLHDLRATIO 6.10 01/19/2018    CHOLHDLRATIO 4.00 01/20/2017         Obesity: Weight has been decreasing since last visit, has made significant dietary improvement.  Weight loss efforts: lower calorie diet, diabetic diet.  Current Body mass index is 31.32 kg/m².   Wt Readings from Last 3 Encounters:   05/17/19 105 kg (231 lb)   11/02/18 107 kg (235 lb)   08/17/18 107 kg (235 lb)           Patient Care Team:  Al Francisco MD as PCP - General (Internal Medicine)  ____________________________________________________________________    REVIEW OF SYSTEMS    Review of Systems  As noted per HPI  Constitutional intended weight loss   Resp neg  CV neg      PHYSICAL EXAMINATION    Physical Exam  Constitutional  No distress, mild weight loss notable   Cardiovascular Rate  normal . Rhythm: regular . Heart sounds:  Normal  Pulmonary/Chest: Effort normal and breath sounds normal.    Psychiatric  Alert. Judgment and thought content normal. Mood normal    REVIEWED DATA:    Labs:   Lab Results   Component Value Date     03/23/2018    K 4.5 03/23/2018    CALCIUM 10.0 03/23/2018    AST 70 (H) 03/23/2018    ALT 91 (H) 03/23/2018    BUN 13 03/23/2018    CREATININE 0.86 03/23/2018    CREATININE 0.87 01/19/2018    CREATININE 0.98 01/20/2017    EGFRIFNONA 94 03/23/2018    EGFRIFAFRI 114 03/23/2018       Lab Results   Component Value Date    HGBA1C 6.60 (H) 11/02/2018    HGBA1C 6.77 (H) 08/17/2018    HGBA1C 7.00 (H) 03/23/2018    MICROALBUR <3.0 08/17/2018       Lab Results   Component Value Date    LDL 62 03/23/2018     (H) 01/19/2018    LDL 81 01/20/2017    HDL 45 03/23/2018    HDL 39 (L) 01/19/2018    TRIG 193 (H) 03/23/2018    CHOLHDLRATIO 3.24 03/23/2018       Lab Results   Component Value Date    TSH 2.940 01/19/2018    FREET4 1.24 01/19/2018        No results  found for: WBC, HGB, PLT     Lab Results   Component Value Date    PROTEIN Comment 2017    GLUCOSEU Comment 2017    BLOODU Comment 2017    NITRITEU Comment 2017    LEUKOCYTESUR Comment 2017       Imaging:          Medical Tests:          Summary of old records / correspondence / consultant report:          Request outside records:          ALLERGIES  No Known Allergies     PFSH:     The following portions of the patient's history were reviewed and updated as appropriate: Allergies / Current Medications / Past Medical History / Surgical History / Social History / Family History    PROBLEM LIST   Patient Active Problem List   Diagnosis   • HTN (hypertension)   • Hyperlipidemia   • Cigarette nicotine dependence with nicotine-induced disorder   • Obesity (BMI 30-39.9)   • DM type 2 (diabetes mellitus, type 2) (CMS/HCC)   • Fatty liver       PAST MEDICAL HISTORY  Past Medical History:   Diagnosis Date   • Colon polyp     dx'ed in [place]   • Diabetes mellitus (CMS/HCC)    • HTN (hypertension)    • Hyperlipidemia        SURGICAL HISTORY  Past Surgical History:   Procedure Laterality Date   • APPENDECTOMY         SOCIAL HISTORY  Social History     Socioeconomic History   • Marital status:      Spouse name: Not on file   • Number of children: 3   • Years of education: Not on file   • Highest education level: Not on file   Occupational History   • Occupation: Front director   Tobacco Use   • Smoking status: Current Every Day Smoker     Packs/day: 0.50     Years: 30.00     Pack years: 15.00   • Smokeless tobacco: Never Used   • Tobacco comment: advised to quit, discussed smoking cessation modalities   Substance and Sexual Activity   • Alcohol use: Yes     Alcohol/week: 1.8 - 3.0 oz     Types: 3 - 5 Standard drinks or equivalent per week       FAMILY HISTORY  Family History   Problem Relation Age of Onset   • Pancreatic cancer Father          at 76   • No Known Problems Brother           **Dragon Disclaimer:   Much of this encounter note is an electronic transcription/translation of spoken language to printed text. The electronic translation of spoken language may permit erroneous, or at times, nonsensical words or phrases to be inadvertently transcribed. Although I have reviewed the note for such errors, some may still exist.       Template created by Cheryle Francisco MD

## 2019-05-20 ENCOUNTER — TELEPHONE (OUTPATIENT)
Dept: INTERNAL MEDICINE | Age: 54
End: 2019-05-20

## 2019-05-20 NOTE — TELEPHONE ENCOUNTER
----- Message from Al Francisco MD sent at 5/17/2019  4:36 PM EDT -----  Call patient with his test result(s) and mail the results to him if MyChart is NOT active.    1) ALT liver enzyme is higher indicating inflammation of the liver.   -Will need to hold atorvastatin for time being in case this may be exacerbating his fatty liver problem.   -Will check Fibrosure test for cirrhosis of liver. I will place this order but schedule him for the lab this coming week.   -Need to avoid all alcohol, Tylenol (acetaminophen).     2) Diabetes and cholesterol are otherwise stable.

## 2019-05-20 NOTE — TELEPHONE ENCOUNTER
LM with pt results, pt to call and schedule lab apt for LFT labs sent to my chart for review. HALEY

## 2019-05-24 LAB
A2 MACROGLOB SERPL-MCNC: 113 MG/DL (ref 110–276)
ALT SERPL W P-5'-P-CCNC: 106 IU/L (ref 0–55)
APO A-I SERPL-MCNC: 132 MG/DL (ref 101–178)
AST SERPL W P-5'-P-CCNC: 66 IU/L (ref 0–40)
BILIRUB SERPL-MCNC: 0.7 MG/DL (ref 0–1.2)
CHOLEST SERPL-MCNC: 144 MG/DL (ref 100–199)
FIBROSIS SCORING:: ABNORMAL
FIBROSIS STAGE SERPL QL: ABNORMAL
GGT SERPL-CCNC: 42 IU/L (ref 0–65)
GLUCOSE SERPL-MCNC: 136 MG/DL (ref 65–99)
HAPTOGLOB SERPL-MCNC: 193 MG/DL (ref 34–200)
INTERPRETATIONS: (REFERENCE): ABNORMAL
LABORATORY COMMENT REPORT: ABNORMAL
LIVER FIBR SCORE SERPL CALC.FIBROSURE: 0.12 (ref 0–0.21)
NASH SCORING (REFERENCE): ABNORMAL
NECROINFLAMMATORY ACT GRADE SERPL QL: ABNORMAL
NECROINFLAMMATORY ACT SCORE SERPL: 0.5
SERVICE CMNT-IMP: ABNORMAL
STEATOSIS GRADE (REFERENCE): ABNORMAL
STEATOSIS GRADING (REFERENCE): ABNORMAL
STEATOSIS SCORE (REFERENCE): 0.83 (ref 0–0.3)
TRIGL SERPL-MCNC: 181 MG/DL (ref 0–149)

## 2019-05-28 DIAGNOSIS — R79.89 ABNORMAL LFTS (LIVER FUNCTION TESTS): Primary | ICD-10-CM

## 2019-05-28 NOTE — PROGRESS NOTES
Call patient with his test result(s) and mail the results to him if MyChart is NOT active.    Place referral to GI for abnormal liver labs which is probably related to CALHOUN/fatty liver. GI specialist may want to perform further evaluation.

## 2019-06-02 ENCOUNTER — PATIENT MESSAGE (OUTPATIENT)
Dept: INTERNAL MEDICINE | Age: 54
End: 2019-06-02

## 2019-06-03 ENCOUNTER — TELEPHONE (OUTPATIENT)
Dept: INTERNAL MEDICINE | Age: 54
End: 2019-06-03

## 2019-06-03 NOTE — TELEPHONE ENCOUNTER
Regarding: Non-Urgent Medical Question  Contact: [phone]  ----- Message from Mychart, Generic sent at 6/2/2019 11:44 AM EDT -----    sir Andrade. I have a question on my medicine take.  I am at [facility] for my family vacation and I had accidentally forgot to take my prescriptions with me. If there is any symptoms I have to be aware of in case of emergency, our family would like to be informed. I'll wait for the reply. Thank you very much.    Emil Watts.

## 2019-06-03 NOTE — TELEPHONE ENCOUNTER
He needs to resume diabetes and especially hypertension medication. Have him go to a clinic there.

## 2019-08-08 ENCOUNTER — OFFICE VISIT (OUTPATIENT)
Dept: GASTROENTEROLOGY | Facility: CLINIC | Age: 54
End: 2019-08-08

## 2019-08-08 VITALS
TEMPERATURE: 97.8 F | BODY MASS INDEX: 31.83 KG/M2 | SYSTOLIC BLOOD PRESSURE: 110 MMHG | HEIGHT: 72 IN | DIASTOLIC BLOOD PRESSURE: 88 MMHG | WEIGHT: 235 LBS

## 2019-08-08 DIAGNOSIS — K76.0 FATTY LIVER: ICD-10-CM

## 2019-08-08 DIAGNOSIS — Z86.010 PERSONAL HISTORY OF COLONIC POLYPS: ICD-10-CM

## 2019-08-08 DIAGNOSIS — R74.01 ELEVATED TRANSAMINASE LEVEL: Primary | ICD-10-CM

## 2019-08-08 PROBLEM — Z86.0100 PERSONAL HISTORY OF COLONIC POLYPS: Status: ACTIVE | Noted: 2019-08-08

## 2019-08-08 LAB
ALBUMIN SERPL-MCNC: 4.6 G/DL (ref 3.5–5.2)
ALBUMIN/GLOB SERPL: 1.8 G/DL
ALP SERPL-CCNC: 74 U/L (ref 39–117)
ALT SERPL-CCNC: 86 U/L (ref 1–41)
AST SERPL-CCNC: 58 U/L (ref 1–40)
BILIRUB SERPL-MCNC: 0.8 MG/DL (ref 0.2–1.2)
BUN SERPL-MCNC: 19 MG/DL (ref 6–20)
BUN/CREAT SERPL: 19.2 (ref 7–25)
CALCIUM SERPL-MCNC: 9.9 MG/DL (ref 8.6–10.5)
CHLORIDE SERPL-SCNC: 103 MMOL/L (ref 98–107)
CO2 SERPL-SCNC: 21 MMOL/L (ref 22–29)
CREAT SERPL-MCNC: 0.99 MG/DL (ref 0.76–1.27)
GLOBULIN SER CALC-MCNC: 2.5 GM/DL
GLUCOSE SERPL-MCNC: 137 MG/DL (ref 65–99)
POTASSIUM SERPL-SCNC: 4.5 MMOL/L (ref 3.5–5.2)
PROT SERPL-MCNC: 7.1 G/DL (ref 6–8.5)
SODIUM SERPL-SCNC: 141 MMOL/L (ref 136–145)

## 2019-08-08 PROCEDURE — 99204 OFFICE O/P NEW MOD 45 MIN: CPT | Performed by: INTERNAL MEDICINE

## 2019-08-08 NOTE — PATIENT INSTRUCTIONS
Labs today    Goal of losing 20 pounds over the next year    Colonoscopy in 2020    2 cups of coffee daily    For any additional questions, concerns or changes to your condition after today's office visit please contact the office at [phone].

## 2019-08-08 NOTE — PROGRESS NOTES
Chief Complaint   Patient presents with   • Liver Eval       Subjective     HPI    Emil Watts is a 54 y.o. male with a past medical history noted below who presents for evaluation of elevated liver enzymes, fatty liver.  He has had elevations in his transaminases going back to 2017, mild in severity, hepatocellular in quality with an elevation in his AST and ALT.  In May of this year, his transaminases got to about 3-4 times upper limit of normal.  A year ago, Dr. Francisco, his PCP, checked a hepatitis panel which was negative, as well as some serologies including an SARAVANAN, AMA, ASMA, iron saturation, ferritin all which did not reveal any cause.  Liver imaging demonstrates hepatic steatosis.  Mr. Watts denies that he has any right upper quadrant pain, jaundice, nor icterus.  He reports that he has been over 200 pounds for at least the past 5 years.  He denies any prior history of liver disease as a younger man nor any family history of liver disease    He does have diabetes as well as hyperlipidemia.    History of colon polyps-- last colonoscopy in [place] in 2015.      He denies that he has had any abdominal surgeries    He drinks rarely    Sporadically exercises--goes to the [employer] occasionally, golfs      Past Medical History:   Diagnosis Date   • Colon polyp     dx'ed in [place]   • Diabetes mellitus (CMS/HCC)    • HTN (hypertension)    • Hyperlipidemia          Current Outpatient Medications:   •  amlodipine-olmesartan (CHANA) 5-20 MG per tablet, TAKE ONE TABLET BY MOUTH EVERY DAY, Disp: 90 tablet, Rfl: 0  •  atorvastatin (LIPITOR) 10 MG tablet, TAKE 1 TABLET BY MOUTH DAILY, Disp: 90 tablet, Rfl: 3  •  Blood Glucose Monitoring Suppl (ONE TOUCH ULTRA 2) w/Device kit, Use as instructed., Disp: 1 each, Rfl: 5  •  glucose blood test strip, Test one time daily before breakfast, Disp: 100 each, Rfl: 0  •  hydrochlorothiazide (HYDRODIURIL) 25 MG tablet, TAKE 1 TABLET BY MOUTH EVERY DAY, Disp: 30 tablet, Rfl: 5  •  JANUMET XR   MG tablet, TAKE 2 TABLETS BY MOUTH DAILY, Disp: 60 tablet, Rfl: 5  •  Lancet Devices (ACCU-CHEK SOFTCLIX) lancets, Test one time daily, Disp: 100 each, Rfl: 0    No Known Allergies    Social History     Socioeconomic History   • Marital status:      Spouse name: Not on file   • Number of children: 3   • Years of education: Not on file   • Highest education level: Not on file   Occupational History   • Occupation: Front director   Tobacco Use   • Smoking status: Current Every Day Smoker     Packs/day: 0.50     Years: 30.00     Pack years: 15.00   • Smokeless tobacco: Never Used   • Tobacco comment: advised to quit, discussed smoking cessation modalities   Substance and Sexual Activity   • Alcohol use: Yes     Alcohol/week: 1.8 - 3.0 oz     Types: 3 - 5 Standard drinks or equivalent per week   • Drug use: No       Family History   Problem Relation Age of Onset   • Pancreatic cancer Father          at 76   • No Known Problems Brother        Review of Systems   Constitutional: Negative for activity change, appetite change and fatigue.   HENT: Negative for sore throat and trouble swallowing.    Respiratory: Negative.    Cardiovascular: Negative.    Gastrointestinal: Negative for abdominal distention, abdominal pain and blood in stool.   Endocrine: Negative for cold intolerance and heat intolerance.   Genitourinary: Negative for difficulty urinating, dysuria and frequency.   Musculoskeletal: Negative for arthralgias, back pain and myalgias.   Skin: Negative.    Hematological: Negative for adenopathy. Does not bruise/bleed easily.   All other systems reviewed and are negative.      Objective     Vitals:    19 0843   BP: 110/88   Temp: 97.8 °F (36.6 °C)         19  0843   Weight: 107 kg (235 lb)     Body mass index is 31.87 kg/m².    Physical Exam   Constitutional: He is oriented to person, place, and time. He appears well-developed and well-nourished. No distress.   HENT:   Head:  Normocephalic and atraumatic.   Right Ear: External ear normal.   Left Ear: External ear normal.   Nose: Nose normal.   Mouth/Throat: Oropharynx is clear and moist.   Eyes: Conjunctivae and EOM are normal. Right eye exhibits no discharge. Left eye exhibits no discharge. No scleral icterus.   Neck: Normal range of motion. Neck supple. No thyromegaly present.   No supraclavicular adenopathy   Cardiovascular: Normal rate, regular rhythm, normal heart sounds and intact distal pulses. Exam reveals no gallop.   No murmur heard.  No lower extremity edema   Pulmonary/Chest: Effort normal and breath sounds normal. No respiratory distress. He has no wheezes.   Abdominal: Soft. Normal appearance and bowel sounds are normal. He exhibits no distension and no mass. There is no hepatosplenomegaly. There is no tenderness. There is no rigidity, no rebound and no guarding. No hernia.   Central abdominal obesity   Genitourinary:   Genitourinary Comments: Rectal exam deferred   Musculoskeletal: Normal range of motion. He exhibits no edema or tenderness.   No atrophy of upper or lower extremities.  Normal digits and nails of both hands.   Lymphadenopathy:     He has no cervical adenopathy.   Neurological: He is alert and oriented to person, place, and time. He displays no atrophy. Coordination normal.   Skin: Skin is warm and dry. No rash noted. He is not diaphoretic. No erythema.   Psychiatric: He has a normal mood and affect. His behavior is normal. Judgment and thought content normal.   Vitals reviewed.      No results found for: WBC, RBC, HGB, HCT, MCV, MCH, MCHC, RDW, RDWSD, MPV, PLT, NEUTRORELPCT, LYMPHORELPCT, MONORELPCT, EOSRELPCT, BASORELPCT, AUTOIGPER, NEUTROABS, LYMPHSABS, MONOSABS, EOSABS, BASOSABS, AUTOIGNUM, NRBC    Glucose   Date Value Ref Range Status   05/17/2019 124 (H) 65 - 99 mg/dL Final     Sodium   Date Value Ref Range Status   05/17/2019 137 136 - 145 mmol/L Final     Potassium   Date Value Ref Range Status    05/17/2019 4.1 3.5 - 5.2 mmol/L Final     CO2   Date Value Ref Range Status   05/17/2019 19.4 (L) 22.0 - 29.0 mmol/L Final     Chloride   Date Value Ref Range Status   05/17/2019 102 98 - 107 mmol/L Final     Anion Gap   Date Value Ref Range Status   05/17/2019 15.6 mmol/L Final     Creatinine   Date Value Ref Range Status   05/17/2019 0.95 0.76 - 1.27 mg/dL Final     BUN   Date Value Ref Range Status   05/17/2019 20 6 - 20 mg/dL Final     BUN/Creatinine Ratio   Date Value Ref Range Status   05/17/2019 21.1 7.0 - 25.0 Final     Calcium   Date Value Ref Range Status   05/17/2019 10.2 8.6 - 10.5 mg/dL Final     eGFR Non  Amer   Date Value Ref Range Status   05/17/2019 83 >60 mL/min/1.73 Final     Alkaline Phosphatase   Date Value Ref Range Status   05/17/2019 81 39 - 117 U/L Final     Total Protein   Date Value Ref Range Status   05/17/2019 7.8 6.0 - 8.5 g/dL Final     ALT (SGPT)   Date Value Ref Range Status   05/17/2019 124 (H) 1 - 41 U/L Final     AST (SGOT)   Date Value Ref Range Status   05/17/2019 75 (H) 1 - 40 U/L Final     Total Bilirubin   Date Value Ref Range Status   05/17/2019 0.5 0.2 - 1.2 mg/dL Final     Albumin   Date Value Ref Range Status   05/17/2019 4.70 3.50 - 5.20 g/dL Final     Globulin   Date Value Ref Range Status   05/17/2019 3.1 gm/dL Final     A/G Ratio   Date Value Ref Range Status   03/23/2018 1.9 g/dL Final         CONCLUSION:  1. Hepatic echotexture consistent with diffuse fatty infiltration with  what appears to be a small area of fatty sparing along the gallbladder  fossa.  2. The gallbladder is collapsed, no biliary duct dilatation.  3. Pancreas obscured due to gas within the overlying stomach.     This report was finalized on 2/6/2018 1:57 PM by Dr. Gonzales Levy MD.           No notes on file    Assessment/Plan    Elevated transaminases: Most likely consistent with fatty liver disease but there are a few serologic tests still need to be obtained    Fatty liver: As per  imaging    Personal history of colon polyps: Last colonoscopy in 2015 in [place]    Plan  We will repeat a CMP today, check ceruloplasmin, A1 AT phenotype, IgG levels  We discussed that given this is most likely fatty liver disease, he is going to have to make a good effort to get his weight down to around 200 pounds via increase activity as well as reducing calorie consumption by 300 dayana/day  Advised 2 cups of brewed coffee daily to prevent progression of fibrosis  Continued efforts at diabetes and lipid control  He will need repeat colonoscopy in 2020--he is considering having that done in [place] again    Sang was seen today for liver eval.    Diagnoses and all orders for this visit:    Elevated transaminase level  -     Comprehensive Metabolic Panel  -     Ceruloplasmin  -     Alpha - 1 - Antitrypsin Phenotype  -     IgG    Fatty liver  -     Comprehensive Metabolic Panel  -     Ceruloplasmin  -     Alpha - 1 - Antitrypsin Phenotype  -     IgG    Personal history of colonic polyps        I have discussed the above plan with the patient.  They verbalize understanding and are in agreement with the plan.  They have been advised to contact the office for any questions, concerns, or changes related to their health.    Dictated utilizing Dragon dictation

## 2019-08-09 LAB
CERULOPLASMIN SERPL-MCNC: 19.2 MG/DL (ref 16–31)
IGG SERPL-MCNC: 864 MG/DL (ref 700–1600)

## 2019-08-12 LAB
A1AT PHENOTYP SERPL IFE: NORMAL
A1AT SERPL-MCNC: 115 MG/DL (ref 90–200)

## 2019-08-15 NOTE — PROGRESS NOTES
His liver tests are improved from his last check but remain elevated    His testing for other causes of liver disease are negative.      His disease is most consistent with fatty liver disease    Needs to focus on weight loss with a goal of getting his weight under 200 pounds in the next year    Follow-up in 6 months.

## 2019-08-16 ENCOUNTER — TELEPHONE (OUTPATIENT)
Dept: GASTROENTEROLOGY | Facility: CLINIC | Age: 54
End: 2019-08-16

## 2019-08-16 NOTE — TELEPHONE ENCOUNTER
Called pt and spoke with pt's daughter who is on the hipaa form. Advised per Dr Almaguer that her father's liver tests are improved from his last check but remain elevate.     His testing for other causes of liver disease are neg.     His disease is most consistent with fatty iver disease.  Needs to focus on weight loss with a goal of getting his weight under 200 lbd in the next yr.  F/u in 6 mo.  She verb understanding and will call back to make appt.

## 2019-08-16 NOTE — TELEPHONE ENCOUNTER
----- Message from Emelia Almaguer MD sent at 8/15/2019  2:25 PM EDT -----  His liver tests are improved from his last check but remain elevated    His testing for other causes of liver disease are negative.      His disease is most consistent with fatty liver disease    Needs to focus on weight loss with a goal of getting his weight under 200 pounds in the next year    Follow-up in 6 months.

## 2019-09-29 DIAGNOSIS — E11.8 TYPE 2 DIABETES MELLITUS WITH COMPLICATION, WITHOUT LONG-TERM CURRENT USE OF INSULIN (HCC): Chronic | ICD-10-CM

## 2019-09-30 RX ORDER — SITAGLIPTIN AND METFORMIN HYDROCHLORIDE 1000; 50 MG/1; MG/1
2 TABLET, FILM COATED, EXTENDED RELEASE ORAL DAILY
Qty: 60 TABLET | Refills: 0 | Status: SHIPPED | OUTPATIENT
Start: 2019-09-30 | End: 2019-12-26 | Stop reason: SDUPTHER

## 2019-10-16 DIAGNOSIS — I10 ESSENTIAL HYPERTENSION: ICD-10-CM

## 2019-10-16 RX ORDER — HYDROCHLOROTHIAZIDE 25 MG/1
TABLET ORAL
Qty: 30 TABLET | Refills: 5 | Status: SHIPPED | OUTPATIENT
Start: 2019-10-16 | End: 2020-04-08

## 2019-10-31 DIAGNOSIS — I10 ESSENTIAL HYPERTENSION: ICD-10-CM

## 2019-10-31 RX ORDER — AMLODIPINE AND OLMESARTAN MEDOXOMIL 5; 20 MG/1; MG/1
1 TABLET ORAL DAILY
Qty: 90 TABLET | Refills: 0 | OUTPATIENT
Start: 2019-10-31

## 2019-11-01 RX ORDER — AMLODIPINE AND OLMESARTAN MEDOXOMIL 5; 20 MG/1; MG/1
1 TABLET ORAL DAILY
Qty: 90 TABLET | Refills: 1 | Status: SHIPPED | OUTPATIENT
Start: 2019-11-01 | End: 2020-05-15 | Stop reason: SDUPTHER

## 2019-11-22 ENCOUNTER — OFFICE VISIT (OUTPATIENT)
Dept: INTERNAL MEDICINE | Age: 54
End: 2019-11-22

## 2019-11-22 VITALS
SYSTOLIC BLOOD PRESSURE: 116 MMHG | OXYGEN SATURATION: 96 % | TEMPERATURE: 95.6 F | HEART RATE: 76 BPM | HEIGHT: 72 IN | DIASTOLIC BLOOD PRESSURE: 80 MMHG | WEIGHT: 241 LBS | BODY MASS INDEX: 32.64 KG/M2

## 2019-11-22 DIAGNOSIS — Z23 ENCOUNTER FOR IMMUNIZATION: ICD-10-CM

## 2019-11-22 DIAGNOSIS — E11.9 TYPE 2 DIABETES MELLITUS WITHOUT COMPLICATION, WITHOUT LONG-TERM CURRENT USE OF INSULIN (HCC): Primary | Chronic | ICD-10-CM

## 2019-11-22 DIAGNOSIS — I10 ESSENTIAL HYPERTENSION: Chronic | ICD-10-CM

## 2019-11-22 DIAGNOSIS — E78.2 MIXED HYPERLIPIDEMIA: Chronic | ICD-10-CM

## 2019-11-22 DIAGNOSIS — K76.0 FATTY LIVER: Chronic | ICD-10-CM

## 2019-11-22 PROCEDURE — 99214 OFFICE O/P EST MOD 30 MIN: CPT | Performed by: INTERNAL MEDICINE

## 2019-11-22 PROCEDURE — 90632 HEPA VACCINE ADULT IM: CPT | Performed by: INTERNAL MEDICINE

## 2019-11-22 PROCEDURE — 90471 IMMUNIZATION ADMIN: CPT | Performed by: INTERNAL MEDICINE

## 2019-11-22 PROCEDURE — 90472 IMMUNIZATION ADMIN EACH ADD: CPT | Performed by: INTERNAL MEDICINE

## 2019-11-22 PROCEDURE — 90674 CCIIV4 VAC NO PRSV 0.5 ML IM: CPT | Performed by: INTERNAL MEDICINE

## 2019-11-22 RX ORDER — MELATONIN
1000 DAILY
COMMUNITY

## 2019-11-22 RX ORDER — CHLORAL HYDRATE 500 MG
CAPSULE ORAL
COMMUNITY

## 2019-11-22 NOTE — PROGRESS NOTES
[facility] [place] INTERNAL MEDICINE  FLORIDALMA CARO M.D.      Emil Nata Watts / 54 y.o. / male  11/22/2019      CHIEF COMPLAINT     Diabetes (6 month f/u); Hypertension; and Hyperlipidemia      ASSESSMENT & PLAN     Problem List Items Addressed This Visit        High    DM type 2 (diabetes mellitus, type 2) (CMS/HCC) - Primary (Chronic)    Overview     Continue Janumet XR 50/1000 TWO daily.          Relevant Medications    glucose blood test strip    Lancet Devices (ACCU-CHEK SOFTCLIX) lancets    Blood Glucose Monitoring Suppl (ONE TOUCH ULTRA 2) w/Device kit    JANUMET XR  MG tablet    Other Relevant Orders    Hemoglobin A1c    Ambulatory Referral to Ophthalmology    Microalbumin / Creatinine Urine Ratio - Urine, Clean Catch       Medium    HTN (hypertension) (Chronic)    Overview     Continue amlodipine/olmesartan 5/20 and hctz 25 mg daily.         Relevant Medications    hydroCHLOROthiazide (HYDRODIURIL) 25 MG tablet    amlodipine-olmesartan (CHANA) 5-20 MG per tablet    Hyperlipidemia (Chronic)    Overview     Stable. Continue atorvastatin 10 mg qd.          Relevant Medications    atorvastatin (LIPITOR) 10 MG tablet    Fatty liver (Chronic)    Overview     Fatty liver on US. Other liver labs negative.   Seen by GI : Fatty Liver (8/2019)         Current Assessment & Plan     Continue to work on weight loss.            Other Visit Diagnoses     Encounter for immunization        Relevant Orders    Flucelvax Quad=>4Years (2893-8343) (Completed)        Orders Placed This Encounter   Procedures   • Flucelvax Quad=>4Years (5413-3082)   • Hepatitis A Vaccine Adult IM   • Hemoglobin A1c   • Microalbumin / Creatinine Urine Ratio - Urine, Clean Catch   • Ambulatory Referral to Ophthalmology     No orders of the defined types were placed in this encounter.      Summary/Discussion:  ·       Next Appointment with me: Visit date not found    Return in about 4 months (around 3/22/2020) for DIABETES.      MEDICATIONS     Current Outpatient  "Medications   Medication Sig Dispense Refill   • amlodipine-olmesartan (CHANA) 5-20 MG per tablet Take 1 tablet by mouth Daily. 90 tablet 1   • Ascorbic Acid (SHARIFA-C PO) Take  by mouth.     • atorvastatin (LIPITOR) 10 MG tablet TAKE 1 TABLET BY MOUTH DAILY 90 tablet 3   • cholecalciferol (VITAMIN D3) 25 MCG (1000 UT) tablet Take 1,000 Units by mouth Daily.     • hydroCHLOROthiazide (HYDRODIURIL) 25 MG tablet TAKE 1 TABLET BY MOUTH EVERY DAY 30 tablet 5   • JANUMET XR  MG tablet TAKE 2 TABLETS BY MOUTH DAILY 60 tablet 0   • Omega-3 Fatty Acids (FISH OIL) 1000 MG capsule capsule Take  by mouth Daily With Breakfast.     • Blood Glucose Monitoring Suppl (ONE TOUCH ULTRA 2) w/Device kit Use as instructed. 1 each 5   • glucose blood test strip Test one time daily before breakfast 100 each 0   • Lancet Devices (ACCU-CHEK SOFTCLIX) lancets Test one time daily 100 each 0     No current facility-administered medications for this visit.           VITAL SIGNS     Visit Vitals  /80   Pulse 76   Temp 95.6 °F (35.3 °C)   Ht 182.9 cm (72\")   Wt 109 kg (241 lb)   SpO2 96%   BMI 32.69 kg/m²       BP Readings from Last 3 Encounters:   11/22/19 116/80   08/08/19 110/88   05/17/19 124/70     Wt Readings from Last 3 Encounters:   11/22/19 109 kg (241 lb)   08/08/19 107 kg (235 lb)   05/17/19 105 kg (231 lb)      Body mass index is 32.69 kg/m².      HISTORY OF PRESENT ILLNESS     He was seen by gastroenterology in August and he was diagnosed with fatty liver.  All the other labs were negative.  He has not been able to lose significant weight.  Type 2 diabetes remains stable on Janumet XR.  Hypertension remains controlled on multidrug regimen.  Hyperlipidemia stable on atorvastatin 10 mg without myalgia.      Patient Care Team:  Al Francisco MD as PCP - General (Internal Medicine)      REVIEW OF SYSTEMS     Review of Systems  Constitutional neg  Resp neg  CV neg  GI fatty liver      PHYSICAL EXAMINATION     Physical Exam    " Emil had a diabetic foot exam performed today.   During the foot exam he had a monofilament test performed (normal).  Vascular Status -  His right foot exhibits normal foot vasculature . His left foot exhibits normal foot vasculature .  Skin Integrity  -  His right foot skin is intact.His left foot skin is intact..    Constitutional  No distress, obese   Cardiovascular Rate  normal . Rhythm: regular . Heart sounds:  Normal  Pulmonary/Chest: Effort normal and breath sounds normal.    Psychiatric  Alert. Judgment intact. Thought content normal. Mood normal      REVIEWED DATA     Labs:     Lab Results   Component Value Date     08/08/2019    K 4.5 08/08/2019    CALCIUM 9.9 08/08/2019    AST 58 (H) 08/08/2019    ALT 86 (H) 08/08/2019    BUN 19 08/08/2019    CREATININE 0.99 08/08/2019    CREATININE 0.95 05/17/2019    CREATININE 0.86 03/23/2018    EGFRIFNONA 79 08/08/2019    EGFRIFAFRI 96 08/08/2019       Lab Results   Component Value Date    HGBA1C 6.67 (H) 05/17/2019    HGBA1C 6.60 (H) 11/02/2018    HGBA1C 6.77 (H) 08/17/2018     (H) 08/08/2019     (H) 03/23/2018     (H) 01/19/2018    MICROALBUR <3.0 08/17/2018       Lab Results   Component Value Date    LDL 69 05/17/2019    LDL 62 03/23/2018     (H) 01/19/2018    HDL 45 05/17/2019    HDL 45 03/23/2018    HDL 39 (L) 01/19/2018    TRIG 181 (H) 05/22/2019    TRIG 116 05/17/2019    TRIG 193 (H) 03/23/2018    CHOLHDLRATIO 3.04 05/17/2019    CHOLHDLRATIO 3.24 03/23/2018    CHOLHDLRATIO 6.10 01/19/2018       Lab Results   Component Value Date    TSH 2.940 01/19/2018    FREET4 1.24 01/19/2018       No results found for: WBC, HGB, PLT    Lab Results   Component Value Date    PROTEIN Comment 01/20/2017    GLUCOSEU Comment 01/20/2017    BLOODU Comment 01/20/2017    NITRITEU Comment 01/20/2017    LEUKOCYTESUR Comment 01/20/2017       Imaging:         Medical Tests:         Summary of old records / correspondence / consultant report:   GI note:  fatty liver      Request outside records:         ALLERGIES  No Known Allergies     PFSH:     The following portions of the patient's history were reviewed and updated as appropriate: Allergies / Current Medications / Past Medical History / Surgical History / Social History / Family History    PROBLEM LIST   Patient Active Problem List   Diagnosis   • HTN (hypertension)   • Hyperlipidemia   • Cigarette nicotine dependence with nicotine-induced disorder   • Obesity (BMI 30-39.9)   • DM type 2 (diabetes mellitus, type 2) (CMS/HCC)   • Fatty liver   • Elevated transaminase level   • Personal history of colonic polyps       PAST MEDICAL HISTORY  Past Medical History:   Diagnosis Date   • Colon polyp     dx'ed in [place]   • Diabetes mellitus (CMS/HCC)    • HTN (hypertension)    • Hyperlipidemia        SURGICAL HISTORY  Past Surgical History:   Procedure Laterality Date   • APPENDECTOMY     • COLONOSCOPY  approx     normal per pt    • UPPER GASTROINTESTINAL ENDOSCOPY  approx 2015    inflammation per pt        SOCIAL HISTORY  Social History     Socioeconomic History   • Marital status:      Spouse name: Not on file   • Number of children: 3   • Years of education: Not on file   • Highest education level: Not on file   Occupational History   • Occupation: Front director   Tobacco Use   • Smoking status: Current Every Day Smoker     Packs/day: 0.50     Years: 30.00     Pack years: 15.00   • Smokeless tobacco: Never Used   • Tobacco comment: advised to quit, discussed smoking cessation modalities   Substance and Sexual Activity   • Alcohol use: Yes     Alcohol/week: 1.8 - 3.0 oz     Types: 3 - 5 Standard drinks or equivalent per week   • Drug use: No       FAMILY HISTORY  Family History   Problem Relation Age of Onset   • Pancreatic cancer Father          at 76   • No Known Problems Brother          **Regino Disclaimer:   Much of this encounter note is an electronic transcription/translation of spoken language  to printed text. The electronic translation of spoken language may permit erroneous, or at times, nonsensical words or phrases to be inadvertently transcribed. Although I have reviewed the note for such errors, some may still exist.       Template created by Cheryle Francisco MD

## 2019-11-23 LAB
ALBUMIN/CREAT UR: 2.3 MG/G CREAT (ref 0–30)
CREAT UR-MCNC: 168.9 MG/DL
HBA1C MFR BLD: 7.1 % (ref 4.8–5.6)
MICROALBUMIN UR-MCNC: 3.9 UG/ML

## 2019-11-25 ENCOUNTER — DOCUMENTATION (OUTPATIENT)
Dept: INTERNAL MEDICINE | Age: 54
End: 2019-11-25

## 2019-11-25 ENCOUNTER — TELEPHONE (OUTPATIENT)
Dept: INTERNAL MEDICINE | Age: 54
End: 2019-11-25

## 2019-11-25 DIAGNOSIS — E11.9 TYPE 2 DIABETES MELLITUS WITHOUT COMPLICATION, WITHOUT LONG-TERM CURRENT USE OF INSULIN (HCC): Primary | ICD-10-CM

## 2019-11-25 NOTE — ASSESSMENT & PLAN NOTE
PLAN UPDATE by phone via MA (Verify compliance on follow-up visit):    - A1c for average glucose level is higher at 7.10 (previously 6.67). START Farxiga 5 mg qAM (dx: DM 2). Continue Janumet XR. See me in 2 months for DM 2. Keep any other future appointments.

## 2019-11-25 NOTE — TELEPHONE ENCOUNTER
----- Message from Al Francisco MD sent at 11/25/2019  1:06 PM EST -----  Regarding: diabetes medication   Yes go ahead and start Farxiga. It will further help with diabetes and weight loss.     ----- Message -----  From: Bhavna Irby MA  Sent: 11/25/2019   9:36 AM  To: Al Francisco MD    Spoke with daughter . Said he was out of janumet for about 1 week . Just got it refill yesterday . Do you still need to send new rx and schedule lisandra. ? advise.            - A1c for average glucose level is higher at 7.10 (previously 6.67). START Farxiga 5 mg qAM (dx: DM 2). Continue Janumet XR. See me in 2 months for DM 2. Keep any other future appointments.

## 2019-11-25 NOTE — PROGRESS NOTES
Call patient with his test result(s) and mail the results to him if MyChart is NOT active.    - A1c for average glucose level is higher at 7.10 (previously 6.67). START Farxiga 5 mg qAM (dx: DM 2). Continue Janumet XR. See me in 2 months for DM 2. Keep any other future appointments.

## 2019-12-26 DIAGNOSIS — E11.8 TYPE 2 DIABETES MELLITUS WITH COMPLICATION, WITHOUT LONG-TERM CURRENT USE OF INSULIN (HCC): Chronic | ICD-10-CM

## 2019-12-26 RX ORDER — SITAGLIPTIN AND METFORMIN HYDROCHLORIDE 1000; 50 MG/1; MG/1
2 TABLET, FILM COATED, EXTENDED RELEASE ORAL DAILY
Qty: 60 TABLET | Refills: 5 | Status: SHIPPED | OUTPATIENT
Start: 2019-12-26 | End: 2020-07-15

## 2020-04-04 DIAGNOSIS — E11.9 TYPE 2 DIABETES MELLITUS WITHOUT COMPLICATION, WITHOUT LONG-TERM CURRENT USE OF INSULIN (HCC): ICD-10-CM

## 2020-04-05 RX ORDER — DAPAGLIFLOZIN 5 MG/1
TABLET, FILM COATED ORAL
Qty: 30 TABLET | Refills: 3 | Status: SHIPPED | OUTPATIENT
Start: 2020-04-05 | End: 2020-05-05 | Stop reason: DRUGHIGH

## 2020-04-06 ENCOUNTER — TELEPHONE (OUTPATIENT)
Dept: INTERNAL MEDICINE | Age: 55
End: 2020-04-06

## 2020-04-06 NOTE — TELEPHONE ENCOUNTER
NATALIYA IS REQUESTING TO GET THE MEDICAL RECORD FOR PT. IT NEEDS TO HAVE THE DATES OF WHEN THE PT GOT HIS TD SHOT AND FLU SHOT. NATALIYA REQUESTED TO KNOW IF SHE COULD GET PT FULL MEDICAL RECORD.     PLEASE ADVISE [phone]

## 2020-04-08 DIAGNOSIS — I10 ESSENTIAL HYPERTENSION: ICD-10-CM

## 2020-04-08 RX ORDER — HYDROCHLOROTHIAZIDE 25 MG/1
TABLET ORAL
Qty: 90 TABLET | Refills: 1 | Status: SHIPPED | OUTPATIENT
Start: 2020-04-08 | End: 2020-10-13

## 2020-04-14 ENCOUNTER — HOSPITAL ENCOUNTER (OUTPATIENT)
Dept: GENERAL RADIOLOGY | Facility: HOSPITAL | Age: 55
Discharge: HOME | End: 2020-04-14
Admitting: FAMILY MEDICINE

## 2020-04-14 DIAGNOSIS — R76.12 (QFT) QUANTIFERON-TB TEST REACTION WITHOUT ACTIVE TUBERCULOSIS: ICD-10-CM

## 2020-04-14 PROCEDURE — 71046 X-RAY EXAM CHEST 2 VIEWS: CPT

## 2020-04-15 ENCOUNTER — APPOINTMENT (OUTPATIENT)
Dept: GENERAL RADIOLOGY | Facility: HOSPITAL | Age: 55
End: 2020-04-15

## 2020-04-30 DIAGNOSIS — I10 ESSENTIAL HYPERTENSION: ICD-10-CM

## 2020-04-30 RX ORDER — AMLODIPINE AND OLMESARTAN MEDOXOMIL 5; 20 MG/1; MG/1
TABLET ORAL
Qty: 90 TABLET | Refills: 3 | OUTPATIENT
Start: 2020-04-30

## 2020-05-05 ENCOUNTER — OFFICE VISIT (OUTPATIENT)
Dept: INTERNAL MEDICINE | Age: 55
End: 2020-05-05

## 2020-05-05 VITALS
OXYGEN SATURATION: 96 % | SYSTOLIC BLOOD PRESSURE: 122 MMHG | HEIGHT: 72 IN | TEMPERATURE: 97.2 F | WEIGHT: 234 LBS | DIASTOLIC BLOOD PRESSURE: 80 MMHG | HEART RATE: 87 BPM | BODY MASS INDEX: 31.69 KG/M2

## 2020-05-05 DIAGNOSIS — E66.9 OBESITY (BMI 30-39.9): Chronic | ICD-10-CM

## 2020-05-05 DIAGNOSIS — I10 ESSENTIAL HYPERTENSION: Chronic | ICD-10-CM

## 2020-05-05 DIAGNOSIS — E78.2 MIXED HYPERLIPIDEMIA: Chronic | ICD-10-CM

## 2020-05-05 DIAGNOSIS — E11.9 TYPE 2 DIABETES MELLITUS WITHOUT COMPLICATION, WITHOUT LONG-TERM CURRENT USE OF INSULIN (HCC): Primary | Chronic | ICD-10-CM

## 2020-05-05 LAB — HBA1C MFR BLD: 7.6 %

## 2020-05-05 PROCEDURE — 99214 OFFICE O/P EST MOD 30 MIN: CPT | Performed by: INTERNAL MEDICINE

## 2020-05-05 PROCEDURE — 83036 HEMOGLOBIN GLYCOSYLATED A1C: CPT | Performed by: INTERNAL MEDICINE

## 2020-05-05 NOTE — ASSESSMENT & PLAN NOTE
POCT A1c today was 7.6 which is little surprising.   Increase Farxiga to 10 mg qAM.   Continue Janumet XR 50/1000 mg two daily.   Watch diet closer.   Follow-up 4 months.     Lab Results   Component Value Date    HGBA1C 7.10 (H) 11/22/2019    HGBA1C 6.67 (H) 05/17/2019    HGBA1C 6.60 (H) 11/02/2018

## 2020-05-05 NOTE — PATIENT INSTRUCTIONS
** IMPORTANT MESSAGE FROM DR. CARO **    In our office, your satisfaction is VERY important to us.     You may receive a survey from [facility] by mail or E-mail for you to provide feedback about your visit. This information is invaluable for me to know what we can do to improve our services.     I ask that you please take a few minutes to complete the survey and let us know how we are doing in serving your needs. (You may receive the survey more than once for multiple visits)    Thank You !    Dr. Caro & Staff    _________________________________________________________________________________________________________________________      ** ADDITIONAL INSTRUCTION / REMINDERS FROM DR. CARO **

## 2020-05-05 NOTE — PROGRESS NOTES
[facility] [place] [facility]INE  FLORIDALMA CARO M.D.      Emli Watts / 55 y.o. / male  05/05/2020      CHIEF COMPLAINT     Hypertension; Diabetes; and Hyperlipidemia      ASSESSMENT & PLAN     Problem List Items Addressed This Visit        High    DM type 2 (diabetes mellitus, type 2) (CMS/[facility]) - Primary (Chronic)    Current Assessment & Plan     POCT A1c today was 7.6 which is little surprising.   Increase Farxiga to 10 mg qAM.   Continue Janumet XR 50/1000 mg two daily.   Watch diet closer.   Follow-up 4 months.     Lab Results   Component Value Date    HGBA1C 7.10 (H) 11/22/2019    HGBA1C 6.67 (H) 05/17/2019    HGBA1C 6.60 (H) 11/02/2018             Relevant Medications    glucose blood test strip    Lancet Devices (ACCU-CHEK SOFTCLIX) lancets    Blood Glucose Monitoring Suppl (ONE TOUCH ULTRA 2) w/Device kit    JANUMET XR  MG tablet    Dapagliflozin Propanediol (Farxiga) 10 MG tablet    Other Relevant Orders    POC Glycosylated Hemoglobin (Hb A1C)       Medium    HTN (hypertension) (Chronic)    Overview     Continue amlodipine/olmesartan 5/20 and hctz 25 mg daily.         Relevant Medications    amlodipine-olmesartan (CHANA) 5-20 MG per tablet    hydroCHLOROthiazide (HYDRODIURIL) 25 MG tablet    Hyperlipidemia (Chronic)    Overview     Stable. Continue atorvastatin 10 mg qd.          Relevant Medications    atorvastatin (LIPITOR) 10 MG tablet       Low    Obesity (BMI 30-39.9) (Chronic)    Current Assessment & Plan     Wt loss of 8 lbs since starting Farxiga 5 mg.    BMI Readings from Last 3 Encounters:   05/05/20 31.73 kg/m²   11/22/19 32.69 kg/m²   08/08/19 31.87 kg/m²      Wt Readings from Last 3 Encounters:   05/05/20 106 kg (234 lb)   11/22/19 109 kg (241 lb)   08/08/19 107 kg (235 lb)                 Orders Placed This Encounter   Procedures   • POC Glycosylated Hemoglobin (Hb A1C)     New Medications Ordered This Visit   Medications   • Dapagliflozin Propanediol (Farxiga) 10 MG tablet     Sig: Take 10 mg by  "mouth Every Morning Before Breakfast.     Dispense:  30 tablet     Refill:  5       Summary/Discussion:      Next Appointment with me: Visit date not found    Return in about 4 months (around 9/5/2020) for Diabetes, Hypertension, Hyperlipidemia.      MEDICATIONS     Current Outpatient Medications   Medication Sig Dispense Refill   • amlodipine-olmesartan (CHANA) 5-20 MG per tablet Take 1 tablet by mouth Daily. 90 tablet 1   • Ascorbic Acid (SHARIFA-C PO) Take  by mouth.     • atorvastatin (LIPITOR) 10 MG tablet TAKE 1 TABLET BY MOUTH DAILY 90 tablet 3   • Blood Glucose Monitoring Suppl (ONE TOUCH ULTRA 2) w/Device kit Use as instructed. 1 each 5   • cholecalciferol (VITAMIN D3) 25 MCG (1000 UT) tablet Take 1,000 Units by mouth Daily.     • FARXIGA 5 MG tablet tablet TAKE 1 TABLET BY MOUTH EVERY MORNING 30 tablet 3   • glucose blood test strip Test one time daily before breakfast 100 each 0   • hydroCHLOROthiazide (HYDRODIURIL) 25 MG tablet TAKE 1 TABLET BY MOUTH EVERY DAY 90 tablet 1   • JANUMET XR  MG tablet TAKE 2 TABLETS BY MOUTH DAILY 60 tablet 5   • Lancet Devices (ACCU-CHEK SOFTCLIX) lancets Test one time daily 100 each 0   • Omega-3 Fatty Acids (FISH OIL) 1000 MG capsule capsule Take  by mouth Daily With Breakfast.       No current facility-administered medications for this visit.           VITAL SIGNS     Visit Vitals  /80   Pulse 87   Temp 97.2 °F (36.2 °C) (Temporal)   Ht 182.9 cm (72.01\")   Wt 106 kg (234 lb)   SpO2 96%   BMI 31.73 kg/m²       BP Readings from Last 3 Encounters:   05/05/20 122/80   11/22/19 116/80   08/08/19 110/88     Wt Readings from Last 3 Encounters:   05/05/20 106 kg (234 lb)   11/22/19 109 kg (241 lb)   08/08/19 107 kg (235 lb)      Body mass index is 31.73 kg/m².      HISTORY OF PRESENT ILLNESS     DM 2: previously started Farxiga 5 mg and he has lost about 8 lbs. On Janumet XR as well.   Lab Results   Component Value Date    HGBA1C 7.10 (H) 11/22/2019    HGBA1C 6.67 (H) " 05/17/2019    HGBA1C 6.60 (H) 11/02/2018      Hypertension remains stable on amlodipine/olmesartan and HCTZ.     Obesity/fatty liver: as noted above.   BMI Readings from Last 3 Encounters:   05/05/20 31.73 kg/m²   11/22/19 32.69 kg/m²   08/08/19 31.87 kg/m²      Wt Readings from Last 3 Encounters:   05/05/20 106 kg (234 lb)   11/22/19 109 kg (241 lb)   08/08/19 107 kg (235 lb)      Hyperlipidemia on atorvastatin 10 mg without problems.   Lab Results   Component Value Date    LDL 69 05/17/2019    LDL 62 03/23/2018     (H) 01/19/2018    HDL 45 05/17/2019          Patient Care Team:  Al Francisco MD as PCP - General (Internal Medicine)      REVIEW OF SYSTEMS     Review of Systems  Constitutional neg x wt loss  Resp neg  CV neg   neg       PHYSICAL EXAMINATION     Physical Exam  Constitutional  No distress, wt loss noted   Psychiatric  Alert. Judgment intact. Thought content normal. Mood normal      REVIEWED DATA     Labs:     Lab Results   Component Value Date     08/08/2019    K 4.5 08/08/2019    CALCIUM 9.9 08/08/2019    AST 58 (H) 08/08/2019    ALT 86 (H) 08/08/2019    BUN 19 08/08/2019    CREATININE 0.99 08/08/2019    CREATININE 0.95 05/17/2019    CREATININE 0.86 03/23/2018    EGFRIFNONA 79 08/08/2019    EGFRIFAFRI 96 08/08/2019       Lab Results   Component Value Date    HGBA1C 7.10 (H) 11/22/2019    HGBA1C 6.67 (H) 05/17/2019    HGBA1C 6.60 (H) 11/02/2018     (H) 08/08/2019     (H) 03/23/2018     (H) 01/19/2018    MICROALBUR 3.9 11/22/2019       Lab Results   Component Value Date    LDL 69 05/17/2019    LDL 62 03/23/2018     (H) 01/19/2018    HDL 45 05/17/2019    HDL 45 03/23/2018    HDL 39 (L) 01/19/2018    TRIG 181 (H) 05/22/2019    TRIG 116 05/17/2019    TRIG 193 (H) 03/23/2018    CHOLHDLRATIO 3.04 05/17/2019    CHOLHDLRATIO 3.24 03/23/2018    CHOLHDLRATIO 6.10 01/19/2018       Lab Results   Component Value Date    TSH 2.940 01/19/2018    FREET4 1.24 01/19/2018        No results found for: WBC, HGB, PLT    Lab Results   Component Value Date    PROTEIN Comment 01/20/2017    GLUCOSEU Comment 01/20/2017    BLOODU Comment 01/20/2017    NITRITEU Comment 01/20/2017    LEUKOCYTESUR Comment 01/20/2017       Imaging:         Medical Tests:         Summary of old records / correspondence / consultant report:         Request outside records:         ALLERGIES  No Known Allergies     PFSH:     The following portions of the patient's history were reviewed and updated as appropriate: Allergies / Current Medications / Past Medical History / Surgical History / Social History / Family History    PROBLEM LIST   Patient Active Problem List   Diagnosis   • HTN (hypertension)   • Hyperlipidemia   • Cigarette nicotine dependence with nicotine-induced disorder   • Obesity (BMI 30-39.9)   • DM type 2 (diabetes mellitus, type 2) (CMS/HCC)   • Fatty liver   • Personal history of colonic polyps       PAST MEDICAL HISTORY  Past Medical History:   Diagnosis Date   • Colon polyp     dx'ed in [place]   • Diabetes mellitus (CMS/HCC)    • HTN (hypertension)    • Hyperlipidemia        SURGICAL HISTORY  Past Surgical History:   Procedure Laterality Date   • APPENDECTOMY     • COLONOSCOPY  approx 2015    normal per pt    • UPPER GASTROINTESTINAL ENDOSCOPY  approx 2015    inflammation per pt        SOCIAL HISTORY  Social History     Socioeconomic History   • Marital status:      Spouse name: Not on file   • Number of children: 3   • Years of education: Not on file   • Highest education level: Not on file   Occupational History   • Occupation: Front director   Tobacco Use   • Smoking status: Current Every Day Smoker     Packs/day: 0.50     Years: 30.00     Pack years: 15.00   • Smokeless tobacco: Never Used   • Tobacco comment: advised to quit, discussed smoking cessation modalities   Substance and Sexual Activity   • Alcohol use: Yes     Alcohol/week: 3.0 - 5.0 standard drinks     Types: 3 - 5 Standard  drinks or equivalent per week   • Drug use: No       FAMILY HISTORY  Family History   Problem Relation Age of Onset   • Pancreatic cancer Father          at 76   • No Known Problems Brother          **Regino Disclaimer:   Much of this encounter note is an electronic transcription/translation of spoken language to printed text. The electronic translation of spoken language may permit erroneous, or at times, nonsensical words or phrases to be inadvertently transcribed. Although I have reviewed the note for such errors, some may still exist.     Template created by Cheryle Francisco MD

## 2020-05-05 NOTE — ASSESSMENT & PLAN NOTE
Wt loss of 8 lbs since starting Farxiga 5 mg.    BMI Readings from Last 3 Encounters:   05/05/20 31.73 kg/m²   11/22/19 32.69 kg/m²   08/08/19 31.87 kg/m²      Wt Readings from Last 3 Encounters:   05/05/20 106 kg (234 lb)   11/22/19 109 kg (241 lb)   08/08/19 107 kg (235 lb)

## 2020-05-15 DIAGNOSIS — I10 ESSENTIAL HYPERTENSION: ICD-10-CM

## 2020-05-15 RX ORDER — AMLODIPINE AND OLMESARTAN MEDOXOMIL 5; 20 MG/1; MG/1
1 TABLET ORAL DAILY
Qty: 90 TABLET | Refills: 3 | Status: SHIPPED | OUTPATIENT
Start: 2020-05-15 | End: 2020-08-12 | Stop reason: SDUPTHER

## 2020-07-15 DIAGNOSIS — E11.8 TYPE 2 DIABETES MELLITUS WITH COMPLICATION, WITHOUT LONG-TERM CURRENT USE OF INSULIN (HCC): Chronic | ICD-10-CM

## 2020-07-15 RX ORDER — SITAGLIPTIN AND METFORMIN HYDROCHLORIDE 1000; 50 MG/1; MG/1
2 TABLET, FILM COATED, EXTENDED RELEASE ORAL DAILY
Qty: 60 TABLET | Refills: 5 | Status: SHIPPED | OUTPATIENT
Start: 2020-07-15 | End: 2021-05-07 | Stop reason: SDUPTHER

## 2020-08-12 ENCOUNTER — TELEPHONE (OUTPATIENT)
Dept: INTERNAL MEDICINE | Age: 55
End: 2020-08-12

## 2020-08-12 DIAGNOSIS — I10 ESSENTIAL HYPERTENSION: ICD-10-CM

## 2020-08-12 RX ORDER — AMLODIPINE AND OLMESARTAN MEDOXOMIL 5; 20 MG/1; MG/1
1 TABLET ORAL DAILY
Qty: 90 TABLET | Refills: 3 | Status: SHIPPED | OUTPATIENT
Start: 2020-08-12 | End: 2021-07-31

## 2020-08-12 NOTE — TELEPHONE ENCOUNTER
HALIMA  CALLED IN AND STATED [facility] NEEDS A REFILL OF amlodipine-olmesartan (CHANA) 5-20 MG per tablet      SENT TO [id number] - [place] [id number] [facility] AT [facility] - [phone] [facility] [phone]   [phone]        HALIMA CALL BACK [phone]

## 2020-09-08 ENCOUNTER — OFFICE VISIT (OUTPATIENT)
Dept: INTERNAL MEDICINE | Age: 55
End: 2020-09-08

## 2020-09-08 VITALS
TEMPERATURE: 97.4 F | OXYGEN SATURATION: 96 % | HEIGHT: 72 IN | SYSTOLIC BLOOD PRESSURE: 120 MMHG | BODY MASS INDEX: 31.69 KG/M2 | WEIGHT: 234 LBS | HEART RATE: 84 BPM | DIASTOLIC BLOOD PRESSURE: 84 MMHG

## 2020-09-08 DIAGNOSIS — E11.9 TYPE 2 DIABETES MELLITUS WITHOUT COMPLICATION, WITHOUT LONG-TERM CURRENT USE OF INSULIN (HCC): Primary | Chronic | ICD-10-CM

## 2020-09-08 DIAGNOSIS — E78.2 MIXED HYPERLIPIDEMIA: Chronic | ICD-10-CM

## 2020-09-08 DIAGNOSIS — I10 ESSENTIAL HYPERTENSION: Chronic | ICD-10-CM

## 2020-09-08 DIAGNOSIS — Z23 NEED FOR IMMUNIZATION AGAINST INFLUENZA: ICD-10-CM

## 2020-09-08 LAB
ALBUMIN SERPL-MCNC: 5.1 G/DL (ref 3.5–5.2)
ALBUMIN/GLOB SERPL: 2.4 G/DL
ALP SERPL-CCNC: 89 U/L (ref 39–117)
ALT SERPL-CCNC: 68 U/L (ref 1–41)
AST SERPL-CCNC: 41 U/L (ref 1–40)
BILIRUB SERPL-MCNC: 0.4 MG/DL (ref 0–1.2)
BUN SERPL-MCNC: 16 MG/DL (ref 6–20)
BUN/CREAT SERPL: 17.8 (ref 7–25)
CALCIUM SERPL-MCNC: 9.7 MG/DL (ref 8.6–10.5)
CHLORIDE SERPL-SCNC: 101 MMOL/L (ref 98–107)
CHOLEST SERPL-MCNC: 235 MG/DL (ref 0–200)
CHOLEST/HDLC SERPL: 5.34 {RATIO}
CO2 SERPL-SCNC: 24 MMOL/L (ref 22–29)
CREAT SERPL-MCNC: 0.9 MG/DL (ref 0.76–1.27)
GLOBULIN SER CALC-MCNC: 2.1 GM/DL
GLUCOSE SERPL-MCNC: 124 MG/DL (ref 65–99)
HBA1C MFR BLD: 6.7 % (ref 4.8–5.6)
HDLC SERPL-MCNC: 44 MG/DL (ref 40–60)
LDLC SERPL CALC-MCNC: 116 MG/DL (ref 0–100)
POTASSIUM SERPL-SCNC: 4.3 MMOL/L (ref 3.5–5.2)
PROT SERPL-MCNC: 7.2 G/DL (ref 6–8.5)
SODIUM SERPL-SCNC: 136 MMOL/L (ref 136–145)
TRIGL SERPL-MCNC: 373 MG/DL (ref 0–150)
VLDLC SERPL CALC-MCNC: 74.6 MG/DL

## 2020-09-08 PROCEDURE — 90471 IMMUNIZATION ADMIN: CPT | Performed by: INTERNAL MEDICINE

## 2020-09-08 PROCEDURE — 99214 OFFICE O/P EST MOD 30 MIN: CPT | Performed by: INTERNAL MEDICINE

## 2020-09-08 PROCEDURE — 90686 IIV4 VACC NO PRSV 0.5 ML IM: CPT | Performed by: INTERNAL MEDICINE

## 2020-09-08 NOTE — PROGRESS NOTES
[facility] [place] INTERNAL MEDICINE  FLORIDALMA CARO M.D.      Emil Watts / 55 y.o. / male  09/08/2020      ASSESSMENT & PLAN:    Problem List Items Addressed This Visit        High    DM type 2 (diabetes mellitus, type 2) (CMS/[facility]) - Primary (Chronic)    Relevant Medications    glucose blood test strip    Lancet Devices (ACCU-CHEK SOFTCLIX) lancets    Blood Glucose Monitoring Suppl (ONE TOUCH ULTRA 2) w/Device kit    Dapagliflozin Propanediol (Farxiga) 10 MG tablet    JANUMET XR  MG tablet    Other Relevant Orders    Comprehensive Metabolic Panel    Hemoglobin A1c       Medium    HTN (hypertension) (Chronic)    Overview     Continue amlodipine/olmesartan 5/20 and hctz 25 mg daily.         Relevant Medications    hydroCHLOROthiazide (HYDRODIURIL) 25 MG tablet    amlodipine-olmesartan (CHANA) 5-20 MG per tablet    Hyperlipidemia (Chronic)    Overview     Stable. Continue atorvastatin 10 mg qd.          Relevant Medications    atorvastatin (LIPITOR) 10 MG tablet    Other Relevant Orders    Lipid Panel With / Chol / HDL Ratio      Other Visit Diagnoses     Need for immunization against influenza            Orders Placed This Encounter   Procedures   • Fluarix Quad >6 Months (VFC) (1029-3196)   • Comprehensive Metabolic Panel   • Hemoglobin A1c   • Lipid Panel With / Chol / HDL Ratio     No orders of the defined types were placed in this encounter.      Summary/Discussion:      Next Appointment with me: Visit date not found    Return in about 4 months (around 1/8/2021) for Reassess chronic medical problems.  ____________________________________________________________________    MEDICATIONS  Current Outpatient Medications   Medication Sig Dispense Refill   • amlodipine-olmesartan (CHANA) 5-20 MG per tablet Take 1 tablet by mouth Daily. 90 tablet 3   • Ascorbic Acid (SHARIFA-C PO) Take  by mouth.     • atorvastatin (LIPITOR) 10 MG tablet TAKE 1 TABLET BY MOUTH DAILY 90 tablet 3   • Blood Glucose Monitoring Suppl (ONE TOUCH ULTRA 2)  "w/Device kit Use as instructed. 1 each 5   • Dapagliflozin Propanediol (Farxiga) 10 MG tablet Take 10 mg by mouth Every Morning Before Breakfast. 30 tablet 5   • glucose blood test strip Test one time daily before breakfast 100 each 0   • hydroCHLOROthiazide (HYDRODIURIL) 25 MG tablet TAKE 1 TABLET BY MOUTH EVERY DAY 90 tablet 1   • JANUMET XR  MG tablet TAKE 2 TABLETS BY MOUTH DAILY 60 tablet 5   • Lancet Devices (ACCU-CHEK SOFTCLIX) lancets Test one time daily 100 each 0   • cholecalciferol (VITAMIN D3) 25 MCG (1000 UT) tablet Take 1,000 Units by mouth Daily.     • Omega-3 Fatty Acids (FISH OIL) 1000 MG capsule capsule Take  by mouth Daily With Breakfast.       No current facility-administered medications for this visit.        VITALS    Visit Vitals  /84   Pulse 84   Temp 97.4 °F (36.3 °C)   Ht 182.9 cm (72.01\")   Wt 106 kg (234 lb)   SpO2 96%   BMI 31.73 kg/m²       BP Readings from Last 3 Encounters:   09/08/20 120/84   05/05/20 122/80   11/22/19 116/80     Wt Readings from Last 3 Encounters:   09/08/20 106 kg (234 lb)   05/05/20 106 kg (234 lb)   11/22/19 109 kg (241 lb)    Body mass index is 31.73 kg/m².    CC:  Main reason(s) for today's visit: Follow-up for diabetes and related medical problems    HPI:     Chronic type 2 diabetes:  Diabetic complications: none identified. Current therapy: Janumet XR and Farxiga.  Most recent change to treatment plan: Farxiga dosage was increased.  Home blood sugar levels: does not check.   Most recent relevant labs:   Lab Results   Component Value Date    HGBA1C 7.6 05/05/2020    HGBA1C 7.10 (H) 11/22/2019    HGBA1C 6.67 (H) 05/17/2019    CREATININE 0.99 08/08/2019    LDL 69 05/17/2019    MICROALBUR 3.9 11/22/2019     Chronic essential hypertension:  Since prior visit: compliant with medication(s) and does not check blood pressure at home.  Most recent in-office blood pressure readings:   BP Readings from Last 3 Encounters:   09/08/20 120/84   05/05/20 122/80 "   11/22/19 116/80     Chronic hyperlipidemia:  Current therapy include atorvastatin, denies problems with medication.    Most recent labs:   Lab Results   Component Value Date    LDL 69 05/17/2019    LDL 62 03/23/2018     (H) 01/19/2018    HDL 45 05/17/2019    HDL 45 03/23/2018    HDL 39 (L) 01/19/2018    TRIG 181 (H) 05/22/2019    CHOLHDLRATIO 3.04 05/17/2019    CHOLHDLRATIO 3.24 03/23/2018    CHOLHDLRATIO 6.10 01/19/2018          Patient Care Team:  Al Francisco MD as PCP - General (Internal Medicine)  ____________________________________________________________________    REVIEW OF SYSTEMS    Review of Systems  As noted per HPI  Constitutional neg  Resp neg  CV neg      PHYSICAL EXAMINATION    Physical Exam  Constitutional  No distress  Cardiovascular Rate  normal . Rhythm: regular . Heart sounds:  Normal  Pulmonary/Chest: Effort normal and breath sounds normal.    Psychiatric  Alert. Judgment and thought content normal. Mood normal    REVIEWED DATA:    Labs:   Lab Results   Component Value Date     08/08/2019    K 4.5 08/08/2019    CALCIUM 9.9 08/08/2019    AST 58 (H) 08/08/2019    ALT 86 (H) 08/08/2019    BUN 19 08/08/2019    CREATININE 0.99 08/08/2019    CREATININE 0.95 05/17/2019    CREATININE 0.86 03/23/2018    EGFRIFNONA 79 08/08/2019    EGFRIFAFRI 96 08/08/2019       Lab Results   Component Value Date    HGBA1C 7.6 05/05/2020    HGBA1C 7.10 (H) 11/22/2019    HGBA1C 6.67 (H) 05/17/2019    GLUCOSE 124 (H) 05/17/2019    MICROALBUR 3.9 11/22/2019       Lab Results   Component Value Date    LDL 69 05/17/2019    LDL 62 03/23/2018     (H) 01/19/2018    HDL 45 05/17/2019    HDL 45 03/23/2018    TRIG 181 (H) 05/22/2019    CHOLHDLRATIO 3.04 05/17/2019       Lab Results   Component Value Date    TSH 2.940 01/19/2018    FREET4 1.24 01/19/2018        No results found for: WBC, HGB, PLT     Lab Results   Component Value Date    PROTEIN Comment 01/20/2017    GLUCOSEU Comment 01/20/2017    BLOODU  Comment 01/20/2017    NITRITEU Comment 01/20/2017    LEUKOCYTESUR Comment 01/20/2017       Imaging:          Medical Tests:          Summary of old records / correspondence / consultant report:          Request outside records:          ALLERGIES  No Known Allergies     PFSH:     The following portions of the patient's history were reviewed and updated as appropriate: Allergies / Current Medications / Past Medical History / Surgical History / Social History / Family History    PROBLEM LIST   Patient Active Problem List   Diagnosis   • HTN (hypertension)   • Hyperlipidemia   • Cigarette nicotine dependence with nicotine-induced disorder   • Obesity (BMI 30-39.9)   • DM type 2 (diabetes mellitus, type 2) (CMS/HCC)   • Fatty liver   • Personal history of colonic polyps       PAST MEDICAL HISTORY  Past Medical History:   Diagnosis Date   • Colon polyp     dx'ed in [place]   • Diabetes mellitus (CMS/HCC)    • HTN (hypertension)    • Hyperlipidemia        SURGICAL HISTORY  Past Surgical History:   Procedure Laterality Date   • APPENDECTOMY     • COLONOSCOPY  approx 2015    normal per pt    • UPPER GASTROINTESTINAL ENDOSCOPY  approx 2015    inflammation per pt        SOCIAL HISTORY  Social History     Socioeconomic History   • Marital status:      Spouse name: Not on file   • Number of children: 3   • Years of education: Not on file   • Highest education level: Not on file   Occupational History   • Occupation: Front director   Tobacco Use   • Smoking status: Current Every Day Smoker     Packs/day: 0.50     Years: 30.00     Pack years: 15.00   • Smokeless tobacco: Never Used   • Tobacco comment: advised to quit, discussed smoking cessation modalities   Substance and Sexual Activity   • Alcohol use: Yes     Alcohol/week: 3.0 - 5.0 standard drinks     Types: 3 - 5 Standard drinks or equivalent per week   • Drug use: No       FAMILY HISTORY  Family History   Problem Relation Age of Onset   • Pancreatic cancer Father           at 76   • No Known Problems Brother          **Regino Disclaimer:   Much of this encounter note is an electronic transcription/translation of spoken language to printed text. The electronic translation of spoken language may permit erroneous, or at times, nonsensical words or phrases to be inadvertently transcribed. Although I have reviewed the note for such errors, some may still exist.       Template created by Cheryle Francisco MD

## 2020-09-09 ENCOUNTER — DOCUMENTATION (OUTPATIENT)
Dept: INTERNAL MEDICINE | Age: 55
End: 2020-09-09

## 2020-09-09 DIAGNOSIS — E78.5 HYPERLIPIDEMIA, UNSPECIFIED HYPERLIPIDEMIA TYPE: Chronic | ICD-10-CM

## 2020-09-09 RX ORDER — ATORVASTATIN CALCIUM 20 MG/1
20 TABLET, FILM COATED ORAL NIGHTLY
Qty: 30 TABLET | Refills: 3 | Status: SHIPPED | OUTPATIENT
Start: 2020-09-09 | End: 2021-02-01 | Stop reason: SDUPTHER

## 2020-09-09 NOTE — PROGRESS NOTES
1. A1c level for blood sugar average is better but can be further improved. Will discuss additional options on followup.     2. LDL cholesterol and triglycerides are significantly worse. Increase atorvastatin to 20 mg nightly. Come fasting for labs on followup.     3. Liver enzyme is lower but remains elevated. Probably related to fatty liver. Will recheck on followup.

## 2020-09-09 NOTE — ASSESSMENT & PLAN NOTE
PLAN UPDATE by phone via MA (Verify compliance on follow-up visit):     Increase atorvastatin to 20 mg daily. Check labs on follow-up.     Lab Results   Component Value Date     (H) 09/08/2020    LDL 69 05/17/2019    LDL 62 03/23/2018    HDL 44 09/08/2020    TRIG 373 (H) 09/08/2020    CHOLHDLRATIO 5.34 09/08/2020

## 2020-10-13 DIAGNOSIS — I10 ESSENTIAL HYPERTENSION: ICD-10-CM

## 2020-10-13 RX ORDER — HYDROCHLOROTHIAZIDE 25 MG/1
TABLET ORAL
Qty: 90 TABLET | Refills: 3 | Status: SHIPPED | OUTPATIENT
Start: 2020-10-13 | End: 2021-08-25

## 2020-12-03 DIAGNOSIS — E11.9 TYPE 2 DIABETES MELLITUS WITHOUT COMPLICATION, WITHOUT LONG-TERM CURRENT USE OF INSULIN (HCC): Chronic | ICD-10-CM

## 2020-12-03 RX ORDER — DAPAGLIFLOZIN 10 MG/1
TABLET, FILM COATED ORAL
Qty: 30 TABLET | Refills: 11 | Status: SHIPPED | OUTPATIENT
Start: 2020-12-03 | End: 2021-08-25 | Stop reason: CLARIF

## 2021-02-01 DIAGNOSIS — E78.5 HYPERLIPIDEMIA, UNSPECIFIED HYPERLIPIDEMIA TYPE: Chronic | ICD-10-CM

## 2021-02-01 RX ORDER — ATORVASTATIN CALCIUM 20 MG/1
20 TABLET, FILM COATED ORAL NIGHTLY
Qty: 30 TABLET | Refills: 11 | OUTPATIENT
Start: 2021-02-01

## 2021-02-02 RX ORDER — ATORVASTATIN CALCIUM 20 MG/1
20 TABLET, FILM COATED ORAL NIGHTLY
Qty: 30 TABLET | Refills: 0 | Status: SHIPPED | OUTPATIENT
Start: 2021-02-02 | End: 2021-02-08 | Stop reason: SDUPTHER

## 2021-02-05 ENCOUNTER — OFFICE VISIT (OUTPATIENT)
Dept: INTERNAL MEDICINE | Age: 56
End: 2021-02-05

## 2021-02-05 VITALS
BODY MASS INDEX: 30.88 KG/M2 | OXYGEN SATURATION: 97 % | SYSTOLIC BLOOD PRESSURE: 102 MMHG | WEIGHT: 228 LBS | TEMPERATURE: 96.9 F | HEART RATE: 86 BPM | HEIGHT: 72 IN | DIASTOLIC BLOOD PRESSURE: 70 MMHG

## 2021-02-05 DIAGNOSIS — I10 ESSENTIAL HYPERTENSION: Chronic | ICD-10-CM

## 2021-02-05 DIAGNOSIS — E11.9 TYPE 2 DIABETES MELLITUS WITHOUT COMPLICATION, WITHOUT LONG-TERM CURRENT USE OF INSULIN (HCC): Primary | Chronic | ICD-10-CM

## 2021-02-05 DIAGNOSIS — E78.2 MIXED HYPERLIPIDEMIA: Chronic | ICD-10-CM

## 2021-02-05 PROCEDURE — 99214 OFFICE O/P EST MOD 30 MIN: CPT | Performed by: INTERNAL MEDICINE

## 2021-02-05 NOTE — PROGRESS NOTES
I N T E R N A L  M E D I C I N E  J U N O H  K I M,  M D      ENCOUNTER DATE:  02/05/2021    Emil Watts / 55 y.o. / male      CHIEF COMPLAINT / REASON FOR OFFICE VISIT     Diabetes ((Utilized Albanian )), Hypertension, Hyperlipidemia, and Obesity      ASSESSMENT & PLAN     Problem List Items Addressed This Visit     DM type 2 (diabetes mellitus, type 2) (CMS/[facility]) - Primary (Chronic)    Current Assessment & Plan     Lab Results   Component Value Date    HGBA1C 6.70 (H) 09/08/2020    HGBA1C 7.6 05/05/2020    HGBA1C 7.10 (H) 11/22/2019    CREATININE 0.90 09/08/2020     (H) 09/08/2020    MICROALBUR 3.9 11/22/2019      Continue Janumet XR 50/1000 mg two daily and Farxiga 10 mg qd.          Relevant Medications    glucose blood test strip    Lancet Devices (ACCU-CHEK SOFTCLIX) lancets    Blood Glucose Monitoring Suppl (ONE TOUCH ULTRA 2) w/Device kit    JANUMET XR  MG tablet    Farxiga 10 MG tablet    Other Relevant Orders    Hemoglobin A1c    Comprehensive Metabolic Panel    Microalbumin / Creatinine Urine Ratio - Urine, Clean Catch    HTN (hypertension) (Chronic)    Current Assessment & Plan     Continue amlodipine/olmesartan 5/20 and hctz 25 mg daily.         Relevant Medications    amlodipine-olmesartan (CHANA) 5-20 MG per tablet    hydroCHLOROthiazide (HYDRODIURIL) 25 MG tablet    Hyperlipidemia (Chronic)    Current Assessment & Plan     Previously increased atorvastatin to 20 mg daily. Check labs today.   Lab Results   Component Value Date     (H) 09/08/2020    LDL 69 05/17/2019    LDL 62 03/23/2018    HDL 44 09/08/2020    TRIG 373 (H) 09/08/2020    CHOLHDLRATIO 5.34 09/08/2020             Relevant Medications    atorvastatin (LIPITOR) 20 MG tablet    Other Relevant Orders    Lipid Panel With / Chol / HDL Ratio        Orders Placed This Encounter   Procedures   • Hemoglobin A1c   • Comprehensive Metabolic Panel   • Microalbumin / Creatinine Urine Ratio - Urine, Clean Catch   •  "Lipid Panel With / Chol / HDL Ratio     No orders of the defined types were placed in this encounter.      SUMMARY/DISCUSSION  •       Next Appointment with me: Visit date not found    Return in about 4 months (around 6/5/2021) for Diabetes.      VITAL SIGNS     Visit Vitals  /70   Pulse 86   Temp 96.9 °F (36.1 °C)   Ht 182.9 cm (72.01\")   Wt 103 kg (228 lb)   SpO2 97%   BMI 30.91 kg/m²       BP Readings from Last 3 Encounters:   02/05/21 102/70   09/08/20 120/84   05/05/20 122/80     Wt Readings from Last 3 Encounters:   02/05/21 103 kg (228 lb)   09/08/20 106 kg (234 lb)   05/05/20 106 kg (234 lb)     Body mass index is 30.91 kg/m².        HISTORY OF PRESENT ILLNESS     REQUIRES Syriac      Blood sugar < 120 at home; Does not check blood pressure at home. Previously increased atorvastatin to 20 mg without problems.         REVIEW OF SYSTEMS     Mild wt loss  Still smokes  No chest pain or shortness of breath  GI neg    neg       PHYSICAL EXAMINATION     Physical Exam  Obese  Psych: Normal mood and affect. Alert and intact judgment.       REVIEWED DATA     Labs:     Lab Results   Component Value Date     09/08/2020    K 4.3 09/08/2020    AST 41 (H) 09/08/2020    ALT 68 (H) 09/08/2020    BUN 16 09/08/2020    CREATININE 0.90 09/08/2020    CREATININE 0.99 08/08/2019    CREATININE 0.95 05/17/2019    EGFRIFNONA 88 09/08/2020    EGFRIFAFRI 107 09/08/2020       Lab Results   Component Value Date    HGBA1C 6.70 (H) 09/08/2020    HGBA1C 7.6 05/05/2020    HGBA1C 7.10 (H) 11/22/2019       Lab Results   Component Value Date     (H) 09/08/2020    LDL 69 05/17/2019    HDL 44 09/08/2020    TRIG 373 (H) 09/08/2020       Lab Results   Component Value Date    TSH 2.940 01/19/2018    FREET4 1.24 01/19/2018       No results found for: WBC, HGB, PLT      Imaging:           Medical Tests:           Summary of old records / correspondence / consultant report:           Request outside records: "           MEDICATIONS AT THE TIME OF OFFICE VISIT       Current Outpatient Medications:   •  amlodipine-olmesartan (CHANA) 5-20 MG per tablet, Take 1 tablet by mouth Daily., Disp: 90 tablet, Rfl: 3  •  Ascorbic Acid (SHARIFA-C PO), Take  by mouth., Disp: , Rfl:   •  Blood Glucose Monitoring Suppl (ONE TOUCH ULTRA 2) w/Device kit, Use as instructed., Disp: 1 each, Rfl: 5  •  cholecalciferol (VITAMIN D3) 25 MCG (1000 UT) tablet, Take 1,000 Units by mouth Daily., Disp: , Rfl:   •  Farxiga 10 MG tablet, TAKE 1 TABLET BY MOUTH EVERY MORNING BEFORE BREAKFAST, Disp: 30 tablet, Rfl: 11  •  glucose blood test strip, Test one time daily before breakfast, Disp: 100 each, Rfl: 0  •  hydroCHLOROthiazide (HYDRODIURIL) 25 MG tablet, TAKE 1 TABLET BY MOUTH EVERY DAY, Disp: 90 tablet, Rfl: 3  •  JANUMET XR  MG tablet, TAKE 2 TABLETS BY MOUTH DAILY, Disp: 60 tablet, Rfl: 5  •  Lancet Devices (ACCU-CHEK SOFTCLIX) lancets, Test one time daily, Disp: 100 each, Rfl: 0  •  Omega-3 Fatty Acids (FISH OIL) 1000 MG capsule capsule, Take  by mouth Daily With Breakfast., Disp: , Rfl:   •  atorvastatin (LIPITOR) 20 MG tablet, Take 1 tablet by mouth Every Night., Disp: 30 tablet, Rfl: 0         *Examiner was wearing KN95 mask, face shield and exam gloves during the entire duration of the visit. Patient was masked the entire time.   Minimum social distance of 6 ft maintained entire visit except if physical contact was necessary as documented.     **Dragon Disclaimer:   Much of this encounter note is an electronic transcription/translation of spoken language to printed text. The electronic translation of spoken language may permit erroneous, or at times, nonsensical words or phrases to be inadvertently transcribed. Although I have reviewed the note for such errors, some may still exist.     Template created by Cheryle Francisco MD

## 2021-02-05 NOTE — ASSESSMENT & PLAN NOTE
Previously increased atorvastatin to 20 mg daily. Check labs today.   Lab Results   Component Value Date     (H) 09/08/2020    LDL 69 05/17/2019    LDL 62 03/23/2018    HDL 44 09/08/2020    TRIG 373 (H) 09/08/2020    CHOLHDLRATIO 5.34 09/08/2020

## 2021-02-05 NOTE — ASSESSMENT & PLAN NOTE
Lab Results   Component Value Date    HGBA1C 6.70 (H) 09/08/2020    HGBA1C 7.6 05/05/2020    HGBA1C 7.10 (H) 11/22/2019    CREATININE 0.90 09/08/2020     (H) 09/08/2020    MICROALBUR 3.9 11/22/2019      Continue Janumet XR 50/1000 mg two daily and Farxiga 10 mg qd.

## 2021-02-06 LAB
ALBUMIN SERPL-MCNC: 4.8 G/DL (ref 3.5–5.2)
ALBUMIN/CREAT UR: <3 MG/G CREAT (ref 0–29)
ALBUMIN/GLOB SERPL: 1.9 G/DL
ALP SERPL-CCNC: 88 U/L (ref 39–117)
ALT SERPL-CCNC: 93 U/L (ref 1–41)
AST SERPL-CCNC: 57 U/L (ref 1–40)
BILIRUB SERPL-MCNC: 0.7 MG/DL (ref 0–1.2)
BUN SERPL-MCNC: 21 MG/DL (ref 6–20)
BUN/CREAT SERPL: 22.3 (ref 7–25)
CALCIUM SERPL-MCNC: 10 MG/DL (ref 8.6–10.5)
CHLORIDE SERPL-SCNC: 103 MMOL/L (ref 98–107)
CHOLEST SERPL-MCNC: 214 MG/DL (ref 0–200)
CHOLEST/HDLC SERPL: 5.35 {RATIO}
CO2 SERPL-SCNC: 22 MMOL/L (ref 22–29)
CREAT SERPL-MCNC: 0.94 MG/DL (ref 0.76–1.27)
CREAT UR-MCNC: 101.7 MG/DL
GLOBULIN SER CALC-MCNC: 2.5 GM/DL
GLUCOSE SERPL-MCNC: 119 MG/DL (ref 65–99)
HBA1C MFR BLD: 6.7 % (ref 4.8–5.6)
HDLC SERPL-MCNC: 40 MG/DL (ref 40–60)
LDLC SERPL CALC-MCNC: 132 MG/DL (ref 0–100)
MICROALBUMIN UR-MCNC: <3 UG/ML
POTASSIUM SERPL-SCNC: 4.2 MMOL/L (ref 3.5–5.2)
PROT SERPL-MCNC: 7.3 G/DL (ref 6–8.5)
SODIUM SERPL-SCNC: 139 MMOL/L (ref 136–145)
TRIGL SERPL-MCNC: 237 MG/DL (ref 0–150)
VLDLC SERPL CALC-MCNC: 42 MG/DL (ref 5–40)

## 2021-02-08 DIAGNOSIS — R79.89 ABNORMAL LFTS (LIVER FUNCTION TESTS): ICD-10-CM

## 2021-02-08 DIAGNOSIS — E11.9 TYPE 2 DIABETES MELLITUS WITHOUT COMPLICATION, WITHOUT LONG-TERM CURRENT USE OF INSULIN (HCC): Primary | ICD-10-CM

## 2021-02-08 DIAGNOSIS — E78.5 HYPERLIPIDEMIA, UNSPECIFIED HYPERLIPIDEMIA TYPE: Chronic | ICD-10-CM

## 2021-02-08 DIAGNOSIS — E78.2 MIXED HYPERLIPIDEMIA: ICD-10-CM

## 2021-02-08 NOTE — PROGRESS NOTES
Call patient with results:  Danish  needed    1) A1c level for blood sugar average is the same.   2) cholesterol is notably worse   3) liver enzymes are worse.     - Place referral to GI for persistent liver lab abnormalities   - avoid any alcohol and improve diet and weight loss   - Stable schedule A1c, cmp and lipid panel 1 week prior to next followup (see me in 3 months)

## 2021-02-09 RX ORDER — ATORVASTATIN CALCIUM 20 MG/1
20 TABLET, FILM COATED ORAL NIGHTLY
Qty: 30 TABLET | Refills: 5 | Status: SHIPPED | OUTPATIENT
Start: 2021-02-09 | End: 2021-07-02

## 2021-03-26 ENCOUNTER — BULK ORDERING (OUTPATIENT)
Dept: CASE MANAGEMENT | Facility: OTHER | Age: 56
End: 2021-03-26

## 2021-03-26 DIAGNOSIS — Z23 IMMUNIZATION DUE: ICD-10-CM

## 2021-03-27 ENCOUNTER — IMMUNIZATION (OUTPATIENT)
Dept: VACCINE CLINIC | Facility: HOSPITAL | Age: 56
End: 2021-03-27

## 2021-03-27 DIAGNOSIS — Z23 IMMUNIZATION DUE: ICD-10-CM

## 2021-03-27 PROCEDURE — 0001A: CPT | Performed by: INTERNAL MEDICINE

## 2021-03-27 PROCEDURE — 91300 HC SARSCOV02 VAC 30MCG/0.3ML IM: CPT | Performed by: INTERNAL MEDICINE

## 2021-04-17 ENCOUNTER — IMMUNIZATION (OUTPATIENT)
Dept: VACCINE CLINIC | Facility: HOSPITAL | Age: 56
End: 2021-04-17

## 2021-04-17 PROCEDURE — 91300 HC SARSCOV02 VAC 30MCG/0.3ML IM: CPT | Performed by: INTERNAL MEDICINE

## 2021-04-17 PROCEDURE — 0002A: CPT | Performed by: INTERNAL MEDICINE

## 2021-05-06 ENCOUNTER — OFFICE VISIT (OUTPATIENT)
Dept: GASTROENTEROLOGY | Facility: CLINIC | Age: 56
End: 2021-05-06

## 2021-05-06 ENCOUNTER — TRANSCRIBE ORDERS (OUTPATIENT)
Dept: LAB | Facility: SURGERY CENTER | Age: 56
End: 2021-05-06

## 2021-05-06 ENCOUNTER — PREP FOR SURGERY (OUTPATIENT)
Dept: SURGERY | Facility: SURGERY CENTER | Age: 56
End: 2021-05-06

## 2021-05-06 VITALS
TEMPERATURE: 98 F | SYSTOLIC BLOOD PRESSURE: 126 MMHG | HEIGHT: 72 IN | HEART RATE: 85 BPM | RESPIRATION RATE: 16 BRPM | BODY MASS INDEX: 28.88 KG/M2 | OXYGEN SATURATION: 99 % | DIASTOLIC BLOOD PRESSURE: 82 MMHG | WEIGHT: 213.2 LBS

## 2021-05-06 DIAGNOSIS — Z86.010 PERSONAL HISTORY OF COLONIC POLYPS: ICD-10-CM

## 2021-05-06 DIAGNOSIS — Z86.010 HISTORY OF COLON POLYPS: Primary | ICD-10-CM

## 2021-05-06 DIAGNOSIS — Z12.11 ENCOUNTER FOR SCREENING FOR MALIGNANT NEOPLASM OF COLON: ICD-10-CM

## 2021-05-06 DIAGNOSIS — K76.0 FATTY LIVER: Primary | Chronic | ICD-10-CM

## 2021-05-06 DIAGNOSIS — Z01.818 OTHER SPECIFIED PRE-OPERATIVE EXAMINATION: Primary | ICD-10-CM

## 2021-05-06 DIAGNOSIS — Z80.0 FAMILY HISTORY OF COLON CANCER IN FATHER: ICD-10-CM

## 2021-05-06 DIAGNOSIS — R74.8 ELEVATED LIVER ENZYMES: ICD-10-CM

## 2021-05-06 DIAGNOSIS — R74.01 ELEVATED TRANSAMINASE LEVEL: Chronic | ICD-10-CM

## 2021-05-06 PROBLEM — Z86.0100 HISTORY OF COLON POLYPS: Status: ACTIVE | Noted: 2021-05-06

## 2021-05-06 PROCEDURE — 99213 OFFICE O/P EST LOW 20 MIN: CPT | Performed by: INTERNAL MEDICINE

## 2021-05-06 RX ORDER — SODIUM CHLORIDE 0.9 % (FLUSH) 0.9 %
10 SYRINGE (ML) INJECTION AS NEEDED
Status: CANCELLED | OUTPATIENT
Start: 2021-05-06

## 2021-05-06 RX ORDER — SODIUM CHLORIDE, SODIUM LACTATE, POTASSIUM CHLORIDE, CALCIUM CHLORIDE 600; 310; 30; 20 MG/100ML; MG/100ML; MG/100ML; MG/100ML
30 INJECTION, SOLUTION INTRAVENOUS CONTINUOUS PRN
Status: CANCELLED | OUTPATIENT
Start: 2021-05-06

## 2021-05-06 RX ORDER — SODIUM CHLORIDE 0.9 % (FLUSH) 0.9 %
3 SYRINGE (ML) INJECTION EVERY 12 HOURS SCHEDULED
Status: CANCELLED | OUTPATIENT
Start: 2021-05-06

## 2021-05-06 NOTE — PROGRESS NOTES
"Chief Complaint   Patient presents with   • Elevated Hepatic Enzymes         History of Present Illness  Patient here for elevated liver enzymes. Ultrasound in 2018 showed fatty liver changes.  He had a fairly extensive workup in February 2018 with labs unremarkable for other cause of high liver enzymes beside fatty liver. Last colonoscopy in 2015 in [place] with polyps.    He denies any GI complaints. Denies heartburn or reflux. Denies abdominal pain.    He reports alcohol use, reports previous daily alcohol use, now drinks around once per week. Reports average of 1 liter of liquor per week.    His father had colon cancer.     Result Review :       US Liver (02/03/2018 08:52)  Comprehensive Metabolic Panel (02/05/2021 10:00)  Hemoglobin A1c (02/05/2021 10:00)  [place] Fibrosure (05/22/2019 09:36)    Vital Signs:   /82   Pulse 85   Temp 98 °F (36.7 °C) (Temporal)   Resp 16   Ht 182.9 cm (72\")   Wt 96.7 kg (213 lb 3.2 oz)   SpO2 99%   BMI 28.92 kg/m²     Body mass index is 28.92 kg/m².     Physical Exam  Vitals reviewed.   Constitutional:       Appearance: Normal appearance. He is well-developed.   HENT:      Nose: Nose normal. No nasal deformity.   Eyes:      General: No scleral icterus.  Neck:      Trachea: No tracheal deviation.   Pulmonary:      Effort: Pulmonary effort is normal. No respiratory distress.      Breath sounds: Normal breath sounds.   Abdominal:      General: Bowel sounds are normal. There is no distension.      Palpations: Abdomen is soft. Abdomen is not rigid. There is no shifting dullness.      Tenderness: There is no abdominal tenderness. There is no guarding or rebound.      Hernia: No hernia is present.   Lymphadenopathy:      Comments: No periumbilical lymphadenopathy   Skin:     General: Skin is warm.   Neurological:      Mental Status: He is alert.   Psychiatric:         Behavior: Behavior normal.           Assessment and Plan    Diagnoses and all orders for this visit:    1. Fatty " liver (Primary)  Comments:  chronic stable problem    2. Elevated liver enzymes    3. Encounter for screening for malignant neoplasm of colon    4. Elevated transaminase level  Comments:  chronic, stable problem    5. Personal history of colonic polyps         BRIEF SUMMARY  Patient here for consultation.  He has a history of colon polyps and family history of colon cancer in his father.  He is due for his screening colonoscopy and we will set this up.  He also has history of fatty liver with steatohepatitis in conjunction with diabetes and ongoing alcohol use.  We advised him regarding alcohol cessation.  His liver enzymes are essentially stable.  Prior work-up in 2019 did not reveal any other source for elevated liver enzymes.    I have reviewed and confirmed the accuracy of the HPI and Assessment and Plan as documented by the APRN EMELI Barnard        Follow Up   No follow-ups on file.    Patient Instructions   Schedule colonoscopy for surveillance of colon polyps.    For fatty liver, weight loss is recommended. Recommend following a low fat and low sugar diet. Recommend management of diabetes and elevated cholesterol with primary care provider if indicated. Regular exercise is recommended. Alcohol avoidance is recommended.              Documentation by Marcai SAINZ acting as a scribe in the following sections on behalf of the billable provider: HPI, ROS, assessment, & plan.

## 2021-05-06 NOTE — PATIENT INSTRUCTIONS
Schedule colonoscopy for surveillance of colon polyps.    For fatty liver, weight loss is recommended. Recommend following a low fat and low sugar diet. Recommend management of diabetes and elevated cholesterol with primary care provider if indicated. Regular exercise is recommended. Alcohol avoidance is recommended.

## 2021-05-07 DIAGNOSIS — E11.8 TYPE 2 DIABETES MELLITUS WITH COMPLICATION, WITHOUT LONG-TERM CURRENT USE OF INSULIN (HCC): Chronic | ICD-10-CM

## 2021-05-07 RX ORDER — SITAGLIPTIN AND METFORMIN HYDROCHLORIDE 1000; 50 MG/1; MG/1
2 TABLET, FILM COATED, EXTENDED RELEASE ORAL DAILY
Qty: 60 TABLET | Refills: 11 | Status: SHIPPED | OUTPATIENT
Start: 2021-05-07 | End: 2021-08-25 | Stop reason: CLARIF

## 2021-05-31 ENCOUNTER — APPOINTMENT (OUTPATIENT)
Dept: LAB | Facility: SURGERY CENTER | Age: 56
End: 2021-05-31

## 2021-06-02 ENCOUNTER — ANESTHESIA (OUTPATIENT)
Dept: SURGERY | Facility: SURGERY CENTER | Age: 56
End: 2021-06-02

## 2021-06-02 ENCOUNTER — ANESTHESIA EVENT (OUTPATIENT)
Dept: SURGERY | Facility: SURGERY CENTER | Age: 56
End: 2021-06-02

## 2021-06-02 ENCOUNTER — HOSPITAL ENCOUNTER (OUTPATIENT)
Facility: SURGERY CENTER | Age: 56
Setting detail: HOSPITAL OUTPATIENT SURGERY
Discharge: HOME | End: 2021-06-02
Attending: INTERNAL MEDICINE | Admitting: INTERNAL MEDICINE

## 2021-06-02 VITALS
WEIGHT: 208 LBS | DIASTOLIC BLOOD PRESSURE: 100 MMHG | OXYGEN SATURATION: 95 % | BODY MASS INDEX: 28.21 KG/M2 | SYSTOLIC BLOOD PRESSURE: 141 MMHG | HEART RATE: 64 BPM | RESPIRATION RATE: 16 BRPM | TEMPERATURE: 98 F

## 2021-06-02 DIAGNOSIS — Z12.11 ENCOUNTER FOR SCREENING FOR MALIGNANT NEOPLASM OF COLON: ICD-10-CM

## 2021-06-02 DIAGNOSIS — Z86.010 HISTORY OF COLON POLYPS: ICD-10-CM

## 2021-06-02 LAB — GLUCOSE BLDC GLUCOMTR-MCNC: 140 MG/DL (ref 70–130)

## 2021-06-02 PROCEDURE — 88305 TISSUE EXAM BY PATHOLOGIST: CPT | Performed by: INTERNAL MEDICINE

## 2021-06-02 PROCEDURE — 82962 GLUCOSE BLOOD TEST: CPT | Performed by: NURSE PRACTITIONER

## 2021-06-02 PROCEDURE — 25010000002 PROPOFOL 10 MG/ML EMULSION: Performed by: ANESTHESIOLOGY

## 2021-06-02 PROCEDURE — 0DBK8ZX EXCISION OF ASCENDING COLON, VIA NATURAL OR ARTIFICIAL OPENING ENDOSCOPIC, DIAGNOSTIC: ICD-10-PCS | Performed by: INTERNAL MEDICINE

## 2021-06-02 PROCEDURE — 45380 COLONOSCOPY AND BIOPSY: CPT | Performed by: INTERNAL MEDICINE

## 2021-06-02 RX ORDER — SODIUM CHLORIDE 0.9 % (FLUSH) 0.9 %
3 SYRINGE (ML) INJECTION EVERY 12 HOURS SCHEDULED
Status: DISCONTINUED | OUTPATIENT
Start: 2021-06-02 | End: 2021-06-02 | Stop reason: HOSPADM

## 2021-06-02 RX ORDER — PROPOFOL 10 MG/ML
VIAL (ML) INTRAVENOUS AS NEEDED
Status: DISCONTINUED | OUTPATIENT
Start: 2021-06-02 | End: 2021-06-02 | Stop reason: SURG

## 2021-06-02 RX ORDER — SODIUM CHLORIDE, SODIUM LACTATE, POTASSIUM CHLORIDE, CALCIUM CHLORIDE 600; 310; 30; 20 MG/100ML; MG/100ML; MG/100ML; MG/100ML
30 INJECTION, SOLUTION INTRAVENOUS CONTINUOUS PRN
Status: DISCONTINUED | OUTPATIENT
Start: 2021-06-02 | End: 2021-06-02 | Stop reason: HOSPADM

## 2021-06-02 RX ORDER — SODIUM CHLORIDE 0.9 % (FLUSH) 0.9 %
10 SYRINGE (ML) INJECTION AS NEEDED
Status: DISCONTINUED | OUTPATIENT
Start: 2021-06-02 | End: 2021-06-02 | Stop reason: HOSPADM

## 2021-06-02 RX ORDER — ONDANSETRON 2 MG/ML
4 INJECTION INTRAMUSCULAR; INTRAVENOUS ONCE AS NEEDED
Status: DISCONTINUED | OUTPATIENT
Start: 2021-06-02 | End: 2021-06-02 | Stop reason: HOSPADM

## 2021-06-02 RX ORDER — PROPOFOL 10 MG/ML
VIAL (ML) INTRAVENOUS CONTINUOUS PRN
Status: DISCONTINUED | OUTPATIENT
Start: 2021-06-02 | End: 2021-06-02 | Stop reason: SURG

## 2021-06-02 RX ORDER — MAGNESIUM HYDROXIDE 1200 MG/15ML
LIQUID ORAL AS NEEDED
Status: DISCONTINUED | OUTPATIENT
Start: 2021-06-02 | End: 2021-06-02 | Stop reason: HOSPADM

## 2021-06-02 RX ORDER — LIDOCAINE HYDROCHLORIDE 20 MG/ML
INJECTION, SOLUTION INFILTRATION; PERINEURAL AS NEEDED
Status: DISCONTINUED | OUTPATIENT
Start: 2021-06-02 | End: 2021-06-02 | Stop reason: SURG

## 2021-06-02 RX ORDER — SODIUM CHLORIDE, SODIUM LACTATE, POTASSIUM CHLORIDE, CALCIUM CHLORIDE 600; 310; 30; 20 MG/100ML; MG/100ML; MG/100ML; MG/100ML
9 INJECTION, SOLUTION INTRAVENOUS CONTINUOUS PRN
Status: DISCONTINUED | OUTPATIENT
Start: 2021-06-02 | End: 2021-06-02 | Stop reason: HOSPADM

## 2021-06-02 RX ADMIN — PROPOFOL 160 MCG/KG/MIN: 10 INJECTION, EMULSION INTRAVENOUS at 07:32

## 2021-06-02 RX ADMIN — PROPOFOL 100 MG: 10 INJECTION, EMULSION INTRAVENOUS at 07:32

## 2021-06-02 RX ADMIN — LIDOCAINE HYDROCHLORIDE 50 MG: 20 INJECTION, SOLUTION INFILTRATION; PERINEURAL at 07:32

## 2021-06-02 RX ADMIN — SODIUM CHLORIDE, POTASSIUM CHLORIDE, SODIUM LACTATE AND CALCIUM CHLORIDE 30 ML/HR: 600; 310; 30; 20 INJECTION, SOLUTION INTRAVENOUS at 06:57

## 2021-06-02 RX ADMIN — FAMOTIDINE 20 MG: 10 INJECTION INTRAVENOUS at 07:12

## 2021-06-02 NOTE — ANESTHESIA POSTPROCEDURE EVALUATION
Patient: Emil Watts    Procedure Summary     Date: 06/02/21 Room / Location: [facility] / [facility]    Anesthesia Start: 0730 Anesthesia Stop: 0755    Procedure: COLONOSCOPY with polypectomy (N/A ) Diagnosis:       History of colon polyps      Encounter for screening for malignant neoplasm of colon      (History of colon polyps [Z86.010])      (Encounter for screening for malignant neoplasm of colon [Z12.11])    Surgeons: Josr Henry MD Provider: Jackie Briggs MD    Anesthesia Type: MAC ASA Status: 2          Anesthesia Type: MAC    Vitals  Vitals Value Taken Time   /104 06/02/21 0818   Temp 36.7 °C (98 °F) 06/02/21 0800   Pulse 64 06/02/21 0818   Resp 16 06/02/21 0815   SpO2 97 % 06/02/21 0818   Vitals shown include unvalidated device data.        Post Anesthesia Care and Evaluation    Patient location during evaluation: PHASE II  Patient participation: complete - patient participated  Level of consciousness: awake  Pain management: adequate  Airway patency: patent  Anesthetic complications: No anesthetic complications    Cardiovascular status: acceptable  Respiratory status: acceptable  Hydration status: acceptable    Comments: /100   Pulse 64   Temp 36.7 °C (98 °F)   Resp 16   Wt 94.3 kg (208 lb)   SpO2 95%   BMI 28.21 kg/m²

## 2021-06-02 NOTE — ANESTHESIA PREPROCEDURE EVALUATION
Anesthesia Evaluation     Patient summary reviewed and Nursing notes reviewed   NPO Solid Status: > 8 hours  NPO Liquid Status: > 2 hours           Airway   Mallampati: I  Dental - normal exam     Pulmonary - normal exam   Cardiovascular - normal exam    (+) hypertension, hyperlipidemia,       Neuro/Psych  GI/Hepatic/Renal/Endo    (+)   liver disease fatty liver disease, diabetes mellitus type 2,     Musculoskeletal     Abdominal    Substance History      OB/GYN          Other                        Anesthesia Plan    ASA 2     MAC

## 2021-06-03 LAB
LAB AP CASE REPORT: NORMAL
LAB AP CLINICAL INFORMATION: NORMAL
PATH REPORT.FINAL DX SPEC: NORMAL
PATH REPORT.GROSS SPEC: NORMAL

## 2021-06-17 ENCOUNTER — TELEPHONE (OUTPATIENT)
Dept: INTERNAL MEDICINE | Age: 56
End: 2021-06-17

## 2021-06-17 NOTE — TELEPHONE ENCOUNTER
PT WAS STUCK IN TRAFFIC AND MISSED HIS APPT. YOUR NEXT OPENING IS NOT UNTIL OCT 7 WHEN WOULD YOU LIKE HIM TO FOLLOW UP TO DISCUSS HIS BLOODWORK HE RECENTLY HAD. PLEASE CALL PATIENT AT  [phone]

## 2021-07-02 ENCOUNTER — OFFICE VISIT (OUTPATIENT)
Dept: INTERNAL MEDICINE | Age: 56
End: 2021-07-02

## 2021-07-02 VITALS
SYSTOLIC BLOOD PRESSURE: 100 MMHG | OXYGEN SATURATION: 98 % | DIASTOLIC BLOOD PRESSURE: 70 MMHG | HEIGHT: 72 IN | BODY MASS INDEX: 28.96 KG/M2 | TEMPERATURE: 97.5 F | WEIGHT: 213.8 LBS | HEART RATE: 79 BPM

## 2021-07-02 DIAGNOSIS — I10 ESSENTIAL HYPERTENSION: Primary | Chronic | ICD-10-CM

## 2021-07-02 DIAGNOSIS — E11.9 TYPE 2 DIABETES MELLITUS WITHOUT COMPLICATION, WITHOUT LONG-TERM CURRENT USE OF INSULIN (HCC): Chronic | ICD-10-CM

## 2021-07-02 DIAGNOSIS — E78.5 HYPERLIPIDEMIA, UNSPECIFIED HYPERLIPIDEMIA TYPE: Chronic | ICD-10-CM

## 2021-07-02 PROBLEM — S62.609A FINGER FRACTURE: Status: ACTIVE | Noted: 2021-04-22

## 2021-07-02 PROCEDURE — 99214 OFFICE O/P EST MOD 30 MIN: CPT | Performed by: NURSE PRACTITIONER

## 2021-07-02 RX ORDER — ATORVASTATIN CALCIUM 20 MG/1
20 TABLET, FILM COATED ORAL DAILY
Qty: 30 TABLET | Refills: 5
Start: 2021-07-02 | End: 2021-11-12 | Stop reason: SDUPTHER

## 2021-07-02 NOTE — PROGRESS NOTES
"    I N T E R N A L  M E D I C I N E  MATTHEW GARCIA, APRN      ENCOUNTER DATE:  07/02/2021    Emil Watts / 56 y.o. / male      CHIEF COMPLAINT / REASON FOR OFFICE VISIT     Diabetes (4 month f/u)      ASSESSMENT & PLAN     1. Essential hypertension  -Continue hydrochlorothiazide 25 mg daily  -Continue amlodipine olmesartan 5-20 mg tablet daily    2. Type 2 diabetes mellitus without complication, without long-term current use of insulin (CMS/[facility])  -Continue Farxiga 10 mg daily  -Continue sitagliptin-Metformin release  mg twice daily  - Ambulatory Referral for Diabetic Eye Exam-Ophthalmology    3. Hyperlipidemia, unspecified hyperlipidemia type  -Patient education.  May change atorvastatin to daily morning dose with noncompliance at nighttime  - atorvastatin (LIPITOR) 20 MG tablet; Take 1 tablet by mouth Daily.  Dispense: 30 tablet; Refill: 5    No orders of the defined types were placed in this encounter.    New Medications Ordered This Visit   Medications   • atorvastatin (LIPITOR) 20 MG tablet     Sig: Take 1 tablet by mouth Daily.     Dispense:  30 tablet     Refill:  5       SUMMARY/DISCUSSION  • Follow-up in 3 months for chronic medical management with PCP    Next Appointment with me: Visit date not found    Return in about 3 months (around 10/2/2021) for Next scheduled follow up PCP Dr. Francisco .      VITAL SIGNS     Visit Vitals  /70   Pulse 79   Temp 97.5 °F (36.4 °C) (Temporal)   Ht 182.9 cm (72\")   Wt 97 kg (213 lb 12.8 oz)   SpO2 98%   BMI 29.00 kg/m²     Wt Readings from Last 3 Encounters:   07/02/21 97 kg (213 lb 12.8 oz)   06/02/21 94.3 kg (208 lb)   05/06/21 96.7 kg (213 lb 3.2 oz)     Body mass index is 29 kg/m².      MEDICATIONS AT THE TIME OF OFFICE VISIT     Current Outpatient Medications on File Prior to Visit   Medication Sig   • amlodipine-olmesartan (CHANA) 5-20 MG per tablet Take 1 tablet by mouth Daily.   • Ascorbic Acid (SHARIFA-C PO) Take  by mouth.   • Blood Glucose Monitoring Suppl " (ONE TOUCH ULTRA 2) w/Device kit Use as instructed.   • cholecalciferol (VITAMIN D3) 25 MCG (1000 UT) tablet Take 1,000 Units by mouth Daily.   • Farxiga 10 MG tablet TAKE 1 TABLET BY MOUTH EVERY MORNING BEFORE BREAKFAST   • glucose blood test strip Test one time daily before breakfast   • hydroCHLOROthiazide (HYDRODIURIL) 25 MG tablet TAKE 1 TABLET BY MOUTH EVERY DAY   • Lancet Devices (ACCU-CHEK SOFTCLIX) lancets Test one time daily   • Omega-3 Fatty Acids (FISH OIL) 1000 MG capsule capsule Take  by mouth Daily With Breakfast.   • SITagliptin-metFORMIN HCl ER (Janumet XR)  MG tablet Take 2 tablets by mouth Daily.   • [DISCONTINUED] atorvastatin (LIPITOR) 20 MG tablet Take 1 tablet by mouth Every Night.     No current facility-administered medications on file prior to visit.         HISTORY OF PRESENT ILLNESS     Patient presents for chronic medical follow-up of hypertension, diabetes, and hyperlipidemia.     Hypertension: Blood pressure today 100/70.  Well-controlled with current regimen of hydrochlorothiazide, amlodipine-olmesartan. Denies any chest pain, shortness of air, headache, visual disturbances, lower extremity leg swelling, or heart palpitations.     Hyperlipidemia: Uncontrolled with atorvastatin 20 mg.  Patient does admit that he only takes atorvastatin 1-2 times weekly due to forgetting to take nighttime dose.  States that he never forgets his morning medications.  Has been referred to gastroenterology due to fatty liver.    Diabetes mellitus type 2: States he does not have an eye doctor at this time.  Acceptable to referral to ophthalmology for diabetic eye exam.  No peripheral neuropathy with diabetes.  Hemoglobin A1c shows well-controlled diabetes at 6.4 with current Farxiga and Janumet.    REVIEW OF SYSTEMS     Constitutional neg except per HPI   Resp neg  CV neg    PHYSICAL EXAMINATION     Physical Exam  Constitutional  No distress  Cardiovascular Rate  normal . Rhythm: regular . Heart  sounds:  normal  Pulmonary/Chest  Effort normal. Breath sounds:  normal  Foot Sensory exam of the foot is normal, tested with the monofilament. Good pulses, no lesions or ulcers, good peripheral pulses.  Psychiatric  Alert. Judgment and thought content normal. Mood normal     REVIEWED DATA     Labs:     Lab Results   Component Value Date    GLUCOSE 124 (H) 05/17/2019    BUN 22 (H) 06/11/2021    CREATININE 0.88 06/11/2021    EGFRIFNONA 90 06/11/2021    EGFRIFAFRI 109 06/11/2021    BCR 25.0 06/11/2021    K 4.4 06/11/2021    CO2 23.2 06/11/2021    CALCIUM 9.9 06/11/2021    PROTENTOTREF 6.7 06/11/2021    ALBUMIN 4.80 06/11/2021    LABIL2 2.5 06/11/2021    AST 21 06/11/2021    ALT 27 06/11/2021     Lab Results   Component Value Date    CHOL 137 05/17/2019    CHLPL 215 (H) 06/11/2021    TRIG 232 (H) 06/11/2021    HDL 44 06/11/2021     (H) 06/11/2021     Lab Results   Component Value Date    HGBA1C 6.40 (H) 06/11/2021     Imaging:           Medical Tests:             Summary of old records / correspondence / consultant report:           Request outside records:           *Examiner was wearing medical surgical mask, face shield and exam gloves during the entire duration of the visit. Patient was masked the entire time.   Minimum social distance of 6 ft maintained entire visit except if physical contact was necessary as documented.     **Dragon Disclaimer:   Much of this encounter note is an electronic transcription/translation of spoken language to printed text. The electronic translation of spoken language may permit erroneous, or at times, nonsensical words or phrases to be inadvertently transcribed. Although I have reviewed the note for such errors, some may still exist.

## 2021-07-31 DIAGNOSIS — I10 ESSENTIAL HYPERTENSION: ICD-10-CM

## 2021-08-02 RX ORDER — AMLODIPINE AND OLMESARTAN MEDOXOMIL 5; 20 MG/1; MG/1
1 TABLET ORAL DAILY
Qty: 90 TABLET | Refills: 1 | Status: SHIPPED | OUTPATIENT
Start: 2021-08-02 | End: 2021-08-25 | Stop reason: CLARIF

## 2021-08-25 ENCOUNTER — OFFICE VISIT (OUTPATIENT)
Dept: INTERNAL MEDICINE | Age: 56
End: 2021-08-25

## 2021-08-25 VITALS
BODY MASS INDEX: 28.58 KG/M2 | HEART RATE: 91 BPM | TEMPERATURE: 97.5 F | SYSTOLIC BLOOD PRESSURE: 100 MMHG | DIASTOLIC BLOOD PRESSURE: 78 MMHG | HEIGHT: 72 IN | WEIGHT: 211 LBS | OXYGEN SATURATION: 99 %

## 2021-08-25 DIAGNOSIS — E11.9 TYPE 2 DIABETES MELLITUS WITHOUT COMPLICATION, WITHOUT LONG-TERM CURRENT USE OF INSULIN (HCC): Primary | ICD-10-CM

## 2021-08-25 DIAGNOSIS — R63.4 WEIGHT LOSS: ICD-10-CM

## 2021-08-25 DIAGNOSIS — I10 ESSENTIAL HYPERTENSION: ICD-10-CM

## 2021-08-25 PROCEDURE — 99214 OFFICE O/P EST MOD 30 MIN: CPT | Performed by: INTERNAL MEDICINE

## 2021-08-25 RX ORDER — HYDROCHLOROTHIAZIDE 12.5 MG/1
12.5 TABLET ORAL DAILY
Qty: 30 TABLET | Refills: 3 | Status: SHIPPED | OUTPATIENT
Start: 2021-08-25 | End: 2021-11-30

## 2021-08-25 RX ORDER — METFORMIN HYDROCHLORIDE 500 MG/1
500 TABLET, EXTENDED RELEASE ORAL
Qty: 120 TABLET | Refills: 3 | Status: SHIPPED | OUTPATIENT
Start: 2021-08-25 | End: 2022-02-10 | Stop reason: SDUPTHER

## 2021-08-25 RX ORDER — LOSARTAN POTASSIUM 100 MG/1
100 TABLET ORAL DAILY
Qty: 30 TABLET | Refills: 3 | Status: SHIPPED | OUTPATIENT
Start: 2021-08-25 | End: 2021-11-30

## 2021-08-25 NOTE — ASSESSMENT & PLAN NOTE
Significant weight loss since starting Farxiga.   Advised to watch closely for ongoing wt loss especially if unintended.   Follow-up in 2 months.     Wt Readings from Last 3 Encounters:   08/25/21 95.7 kg (211 lb)   07/02/21 97 kg (213 lb 12.8 oz)   06/02/21 94.3 kg (208 lb)

## 2021-08-25 NOTE — ASSESSMENT & PLAN NOTE
Due to change in insurance:    · Discontinue amlodipine/olmesartan 5/20  · Start losartan 100 mg qd  · Decrease HCTZ to 12.5 mg qd    Monitor home blood pressure readings.      BP Readings from Last 3 Encounters:   08/25/21 100/78   07/02/21 100/70   06/02/21 141/100

## 2021-08-25 NOTE — ASSESSMENT & PLAN NOTE
Due to change in insurance/formulary:    · Discontinue Janumet XR and Farxiga.  · Start Onglyza 5 mg qAM, metformin  mg 2 BID, and Jardiance 25 mg qd.  · Monitor glucose closely    Lab Results   Component Value Date    HGBA1C 6.40 (H) 06/11/2021    HGBA1C 6.70 (H) 02/05/2021    HGBA1C 6.70 (H) 09/08/2020

## 2021-08-25 NOTE — PROGRESS NOTES
I N T E R N A L  M E D I C I N E  J U N O H  K I M,  M D      ENCOUNTER DATE:  08/25/2021    Emil Watts / 56 y.o. / male      CHIEF COMPLAINT / REASON FOR OFFICE VISIT     Diabetes ( insurance denied medication )      ASSESSMENT & PLAN     Problem List Items Addressed This Visit        High    DM type 2 (diabetes mellitus, type 2) (CMS/[facility]) - Primary (Chronic)    Current Assessment & Plan     Due to change in insurance/formulary:    · Discontinue Janumet XR and Farxiga.  · Start Onglyza 5 mg qAM, metformin  mg 2 BID, and Jardiance 25 mg qd.  · Monitor glucose closely    Lab Results   Component Value Date    HGBA1C 6.40 (H) 06/11/2021    HGBA1C 6.70 (H) 02/05/2021    HGBA1C 6.70 (H) 09/08/2020             Relevant Medications    glucose blood test strip    Lancet Devices (ACCU-CHEK SOFTCLIX) lancets    Blood Glucose Monitoring Suppl (ONE TOUCH ULTRA 2) w/Device kit    losartan (COZAAR) 100 MG tablet    SAXagliptin (Onglyza) 5 MG tablet    metFORMIN ER (GLUCOPHAGE-XR) 500 MG 24 hr tablet    empagliflozin (Jardiance) 25 MG tablet tablet       Medium    HTN (hypertension) (Chronic)    Current Assessment & Plan     Due to change in insurance:    · Discontinue amlodipine/olmesartan 5/20  · Start losartan 100 mg qd  · Decrease HCTZ to 12.5 mg qd    Monitor home blood pressure readings.      BP Readings from Last 3 Encounters:   08/25/21 100/78   07/02/21 100/70   06/02/21 141/100             Relevant Medications    losartan (COZAAR) 100 MG tablet    hydroCHLOROthiazide (HYDRODIURIL) 12.5 MG tablet       Low    Weight loss    Current Assessment & Plan     Significant weight loss since starting Farxiga.   Advised to watch closely for ongoing wt loss especially if unintended.   Follow-up in 2 months.     Wt Readings from Last 3 Encounters:   08/25/21 95.7 kg (211 lb)   07/02/21 97 kg (213 lb 12.8 oz)   06/02/21 94.3 kg (208 lb)                 No orders of the defined types were placed in this encounter.    New  "Medications Ordered This Visit   Medications   • losartan (COZAAR) 100 MG tablet     Sig: Take 1 tablet by mouth Daily.     Dispense:  30 tablet     Refill:  3   • SAXagliptin (Onglyza) 5 MG tablet     Sig: Take 1 tablet by mouth Daily With Breakfast.     Dispense:  30 tablet     Refill:  3   • metFORMIN ER (GLUCOPHAGE-XR) 500 MG 24 hr tablet     Sig: Take 1 tablet by mouth Daily With Breakfast.     Dispense:  120 tablet     Refill:  3   • empagliflozin (Jardiance) 25 MG tablet tablet     Sig: Take 1 tablet by mouth Every Morning Before Breakfast.     Dispense:  30 tablet     Refill:  3   • hydroCHLOROthiazide (HYDRODIURIL) 12.5 MG tablet     Sig: Take 1 tablet by mouth Daily.     Dispense:  30 tablet     Refill:  3       SUMMARY/DISCUSSION  •       Next Appointment with me: 11/2/2021    Return in about 2 months (around 10/25/2021) for Reassess chronic medical problems.        VITAL SIGNS     Visit Vitals  /78 (BP Location: Left arm)   Pulse 91   Temp 97.5 °F (36.4 °C)   Ht 182.9 cm (72\")   Wt 95.7 kg (211 lb)   SpO2 99%   BMI 28.62 kg/m²       BP Readings from Last 3 Encounters:   08/25/21 100/78   07/02/21 100/70   06/02/21 141/100     Wt Readings from Last 3 Encounters:   08/25/21 95.7 kg (211 lb)   07/02/21 97 kg (213 lb 12.8 oz)   06/02/21 94.3 kg (208 lb)     Body mass index is 28.62 kg/m².      MEDICATIONS AT THE TIME OF OFFICE VISIT     Current Outpatient Medications on File Prior to Visit   Medication Sig   • Ascorbic Acid (SHARIFA-C PO) Take  by mouth.   • atorvastatin (LIPITOR) 20 MG tablet Take 1 tablet by mouth Daily.   • Blood Glucose Monitoring Suppl (ONE TOUCH ULTRA 2) w/Device kit Use as instructed.   • cholecalciferol (VITAMIN D3) 25 MCG (1000 UT) tablet Take 1,000 Units by mouth Daily.   • glucose blood test strip Test one time daily before breakfast   • Lancet Devices (ACCU-CHEK SOFTCLIX) lancets Test one time daily   • Omega-3 Fatty Acids (FISH OIL) 1000 MG capsule capsule Take  by mouth " Daily With Breakfast.   • [DISCONTINUED] amlodipine-olmesartan (CHANA) 5-20 MG per tablet TAKE 1 TABLET BY MOUTH DAILY   • [DISCONTINUED] Farxiga 10 MG tablet TAKE 1 TABLET BY MOUTH EVERY MORNING BEFORE BREAKFAST   • [DISCONTINUED] hydroCHLOROthiazide (HYDRODIURIL) 25 MG tablet TAKE 1 TABLET BY MOUTH EVERY DAY   • [DISCONTINUED] SITagliptin-metFORMIN HCl ER (Janumet XR)  MG tablet Take 2 tablets by mouth Daily.     No current facility-administered medications on file prior to visit.         HISTORY OF PRESENT ILLNESS     Presents with daughter today:    Changed in work insurance and formulary change. Needs to change his diabetes and hypertension medications.   Does not check glucose or blood pressure  regularly   Notable wt loss since starting Farxiga but he also reports significant work related stress so is not eating as regularly  Denies significant hypoglycemia problems    Lab Results   Component Value Date    HGBA1C 6.40 (H) 06/11/2021    HGBA1C 6.70 (H) 02/05/2021    HGBA1C 6.70 (H) 09/08/2020    CREATININE 0.88 06/11/2021     (H) 06/11/2021    MICROALBUR <3.0 02/05/2021          REVIEW OF SYSTEMS     Wt loss since Farxiga   Denies fever, night sweat or unusual fatigue   Higher level of work stress  Denies chest pain or shortness of breath  Denies abdominal pain       PHYSICAL EXAMINATION     Physical Exam  No acute distress, wt loss noted   Psych: Normal thought and judgment.       REVIEWED DATA     Labs:       Lab Results   Component Value Date     06/11/2021    K 4.4 06/11/2021    CALCIUM 9.9 06/11/2021    AST 21 06/11/2021    ALT 27 06/11/2021    BUN 22 (H) 06/11/2021    CREATININE 0.88 06/11/2021    CREATININE 0.94 02/05/2021    CREATININE 0.90 09/08/2020    EGFRIFNONA 90 06/11/2021    EGFRIFAFRI 109 06/11/2021       Lab Results   Component Value Date    HGBA1C 6.40 (H) 06/11/2021    HGBA1C 6.70 (H) 02/05/2021    HGBA1C 6.70 (H) 09/08/2020       Lab Results   Component Value Date    LDL  130 (H) 06/11/2021     (H) 02/05/2021    HDL 44 06/11/2021    TRIG 232 (H) 06/11/2021       Lab Results   Component Value Date    TSH 2.940 01/19/2018    TSH 3.010 05/19/2017    TSH 5.630 (H) 01/20/2017    FREET4 1.24 01/19/2018    FREET4 1.19 05/19/2017    FREET4 1.27 01/20/2017       No results found for: WBC, HGB, PLT      Imaging:           Medical Tests:           Summary of old records / correspondence / consultant report:           Request outside records:           *Examiner was wearing KN95 mask and eye protection during the entire duration of the visit. Patient was masked the entire time. Minimum social distance of 6 ft maintained entire visit except if physical contact was necessary as documented.     **Dragon Disclaimer: Much of this encounter note is an electronic transcription/translation of spoken language to printed text. The electronic translation of spoken language may permit erroneous, or at times, nonsensical words or phrases to be inadvertently transcribed. Although I have reviewed the note for such errors, some may still exist.       Template created by Cheryle Francisco MD

## 2021-09-05 DIAGNOSIS — E78.5 HYPERLIPIDEMIA, UNSPECIFIED HYPERLIPIDEMIA TYPE: Chronic | ICD-10-CM

## 2021-09-07 RX ORDER — ATORVASTATIN CALCIUM 20 MG/1
TABLET, FILM COATED ORAL
Qty: 30 TABLET | Refills: 5 | OUTPATIENT
Start: 2021-09-07

## 2021-09-27 DIAGNOSIS — E11.9 TYPE 2 DIABETES MELLITUS WITHOUT COMPLICATION, WITHOUT LONG-TERM CURRENT USE OF INSULIN (HCC): ICD-10-CM

## 2021-09-28 DIAGNOSIS — E11.9 TYPE 2 DIABETES MELLITUS WITHOUT COMPLICATION, WITHOUT LONG-TERM CURRENT USE OF INSULIN (HCC): ICD-10-CM

## 2021-10-18 DIAGNOSIS — I10 ESSENTIAL HYPERTENSION: ICD-10-CM

## 2021-10-18 RX ORDER — HYDROCHLOROTHIAZIDE 25 MG/1
TABLET ORAL
Qty: 90 TABLET | Refills: 0 | OUTPATIENT
Start: 2021-10-18

## 2021-11-05 ENCOUNTER — OFFICE VISIT (OUTPATIENT)
Dept: INTERNAL MEDICINE | Age: 56
End: 2021-11-05

## 2021-11-05 VITALS
HEART RATE: 88 BPM | SYSTOLIC BLOOD PRESSURE: 110 MMHG | HEIGHT: 72 IN | DIASTOLIC BLOOD PRESSURE: 84 MMHG | OXYGEN SATURATION: 98 % | TEMPERATURE: 97.7 F | WEIGHT: 200 LBS | BODY MASS INDEX: 27.09 KG/M2

## 2021-11-05 DIAGNOSIS — E11.9 TYPE 2 DIABETES MELLITUS WITHOUT COMPLICATION, WITHOUT LONG-TERM CURRENT USE OF INSULIN (HCC): Primary | Chronic | ICD-10-CM

## 2021-11-05 DIAGNOSIS — F17.219 CIGARETTE NICOTINE DEPENDENCE WITH NICOTINE-INDUCED DISORDER: Chronic | ICD-10-CM

## 2021-11-05 DIAGNOSIS — I10 PRIMARY HYPERTENSION: Chronic | ICD-10-CM

## 2021-11-05 DIAGNOSIS — E78.2 MIXED HYPERLIPIDEMIA: Chronic | ICD-10-CM

## 2021-11-05 DIAGNOSIS — K76.0 FATTY LIVER: Chronic | ICD-10-CM

## 2021-11-05 PROCEDURE — 90686 IIV4 VACC NO PRSV 0.5 ML IM: CPT | Performed by: INTERNAL MEDICINE

## 2021-11-05 PROCEDURE — 90471 IMMUNIZATION ADMIN: CPT | Performed by: INTERNAL MEDICINE

## 2021-11-05 PROCEDURE — 99214 OFFICE O/P EST MOD 30 MIN: CPT | Performed by: INTERNAL MEDICINE

## 2021-11-05 NOTE — PROGRESS NOTES
I N T E R N A L  M E D I C I N E  J U N O H  K I M,  M D      ENCOUNTER DATE:  11/05/2021    Emil Watts / 56 y.o. / male      CHIEF COMPLAINT / REASON FOR OFFICE VISIT     Diabetes, Hypertension, Hyperlipidemia, and Obesity      ASSESSMENT & PLAN     Problem List Items Addressed This Visit        High    DM type 2 (diabetes mellitus, type 2) (HCC) - Primary (Chronic)    Overview     Continue metformin  mg qd, Onglyza 5 mg qd and Jardiance 25 mg qd.          Current Assessment & Plan     Lab Results   Component Value Date    HGBA1C 6.40 (H) 06/11/2021    HGBA1C 6.70 (H) 02/05/2021    HGBA1C 6.70 (H) 09/08/2020    CREATININE 0.88 06/11/2021     (H) 06/11/2021    MICROALBUR <3.0 02/05/2021        Check labs today.          Relevant Medications    glucose blood test strip    Lancet Devices (ACCU-CHEK SOFTCLIX) lancets    Blood Glucose Monitoring Suppl (ONE TOUCH ULTRA 2) w/Device kit    losartan (COZAAR) 100 MG tablet    SAXagliptin (Onglyza) 5 MG tablet    metFORMIN ER (GLUCOPHAGE-XR) 500 MG 24 hr tablet    empagliflozin (Jardiance) 25 MG tablet tablet    Other Relevant Orders    Comprehensive Metabolic Panel    Hemoglobin A1c       Medium    HTN (hypertension) (Chronic)    Overview     Continue losartan 100 mg and HCTZ 12.5 mg qd.          Relevant Medications    losartan (COZAAR) 100 MG tablet    hydroCHLOROthiazide (HYDRODIURIL) 12.5 MG tablet    Hyperlipidemia (Chronic)    Overview     Continue atorvastatin 20 mg.          Current Assessment & Plan     Has been compliant with atorvastatin since last time. Check labs today.          Relevant Medications    atorvastatin (LIPITOR) 20 MG tablet    Other Relevant Orders    Lipid Panel With / Chol / HDL Ratio       Low    Cigarette nicotine dependence with nicotine-induced disorder (Chronic)    Overview     X 30 yrs         Current Assessment & Plan     Advised to quit smoking.          Fatty liver (Chronic)    Overview     Fatty liver on US. Other  "liver labs negative.   Seen by GI : Fatty Liver (8/2019)             Orders Placed This Encounter   Procedures   • FluLaval/Fluarix/Fluzone >6 Months   • Comprehensive Metabolic Panel   • Hemoglobin A1c   • Lipid Panel With / Chol / HDL Ratio     No orders of the defined types were placed in this encounter.      SUMMARY/DISCUSSION  •       Next Appointment with me: Visit date not found    Return in about 4 months (around 3/5/2022) for Reassess chronic medical problems.        VITAL SIGNS     Visit Vitals  /84 (BP Location: Left arm)   Pulse 88   Temp 97.7 °F (36.5 °C)   Ht 182.9 cm (72\")   Wt 90.7 kg (200 lb)   SpO2 98%   BMI 27.12 kg/m²       BP Readings from Last 3 Encounters:   11/05/21 110/84   10/01/21 130/89   08/25/21 100/78     Wt Readings from Last 3 Encounters:   11/05/21 90.7 kg (200 lb)   08/25/21 95.7 kg (211 lb)   07/02/21 97 kg (213 lb 12.8 oz)     Body mass index is 27.12 kg/m².      MEDICATIONS AT THE TIME OF OFFICE VISIT     Current Outpatient Medications on File Prior to Visit   Medication Sig   • Ascorbic Acid (SHARIFA-C PO) Take  by mouth.   • atorvastatin (LIPITOR) 20 MG tablet Take 1 tablet by mouth Daily.   • Blood Glucose Monitoring Suppl (ONE TOUCH ULTRA 2) w/Device kit Use as instructed.   • cholecalciferol (VITAMIN D3) 25 MCG (1000 UT) tablet Take 1,000 Units by mouth Daily.   • empagliflozin (Jardiance) 25 MG tablet tablet Take 1 tablet by mouth Every Morning Before Breakfast.   • glucose blood test strip Test one time daily before breakfast   • hydroCHLOROthiazide (HYDRODIURIL) 12.5 MG tablet Take 1 tablet by mouth Daily.   • Lancet Devices (ACCU-CHEK SOFTCLIX) lancets Test one time daily   • losartan (COZAAR) 100 MG tablet Take 1 tablet by mouth Daily.   • metFORMIN ER (GLUCOPHAGE-XR) 500 MG 24 hr tablet Take 1 tablet by mouth Daily With Breakfast.   • Omega-3 Fatty Acids (FISH OIL) 1000 MG capsule capsule Take  by mouth Daily With Breakfast.   • SAXagliptin (Onglyza) 5 MG tablet " Take 1 tablet by mouth Daily With Breakfast.     No current facility-administered medications on file prior to visit.         HISTORY OF PRESENT ILLNESS     Better compliant with medications. Wt is down 11 lbs since 8/21.     Lab Results   Component Value Date    HGBA1C 6.40 (H) 06/11/2021    HGBA1C 6.70 (H) 02/05/2021    HGBA1C 6.70 (H) 09/08/2020    CREATININE 0.88 06/11/2021     (H) 06/11/2021    MICROALBUR <3.0 02/05/2021      Wt Readings from Last 3 Encounters:   11/05/21 90.7 kg (200 lb)   08/25/21 95.7 kg (211 lb)   07/02/21 97 kg (213 lb 12.8 oz)         REVIEW OF SYSTEMS     Weight loss, appetite okay, no night sweat   No CP or shortness of breath  GI neg       PHYSICAL EXAMINATION     Physical Exam  General: No acute distress; weight loss noted   Psych: Normal thought and judgment   Cardiovascular Rate: normal. Rhythm: regular. Heart sounds: normal.   Pulm/Chest: Effort normal, breath sounds normal.       REVIEWED DATA     Labs:       Lab Results   Component Value Date     06/11/2021    K 4.4 06/11/2021    CALCIUM 9.9 06/11/2021    AST 21 06/11/2021    ALT 27 06/11/2021    BUN 22 (H) 06/11/2021    CREATININE 0.88 06/11/2021    CREATININE 0.94 02/05/2021    CREATININE 0.90 09/08/2020    EGFRIFNONA 90 06/11/2021    EGFRIFAFRI 109 06/11/2021       Lab Results   Component Value Date    HGBA1C 6.40 (H) 06/11/2021    HGBA1C 6.70 (H) 02/05/2021    HGBA1C 6.70 (H) 09/08/2020       Lab Results   Component Value Date     (H) 06/11/2021     (H) 02/05/2021    HDL 44 06/11/2021    TRIG 232 (H) 06/11/2021       Lab Results   Component Value Date    TSH 2.940 01/19/2018    TSH 3.010 05/19/2017    TSH 5.630 (H) 01/20/2017    FREET4 1.24 01/19/2018    FREET4 1.19 05/19/2017    FREET4 1.27 01/20/2017     Lab Results   Component Value Date    PSA 3.010 05/17/2019        No results found for: WBC, HGB, PLT    Lab Results   Component Value Date    MICROALBUR <3.0 02/05/2021           Imaging:            Medical Tests:           Summary of old records / correspondence / consultant report:           Request outside records:           *Examiner was wearing KN95 mask and eye protection during the entire duration of the visit. Patient was masked the entire time. Minimum social distance of 6 ft maintained entire visit except if physical contact was necessary as documented.     **Dragon Disclaimer: Much of this encounter note is an electronic transcription/translation of spoken language to printed text. The electronic translation of spoken language may permit erroneous, or at times, nonsensical words or phrases to be inadvertently transcribed. Although I have reviewed the note for such errors, some may still exist.       Template created by Cheryle Francisco MD

## 2021-11-06 LAB
ALBUMIN SERPL-MCNC: 5.1 G/DL (ref 3.8–4.9)
ALBUMIN/GLOB SERPL: 2.1 {RATIO} (ref 1.2–2.2)
ALP SERPL-CCNC: 101 IU/L (ref 44–121)
ALT SERPL-CCNC: 15 IU/L (ref 0–44)
AST SERPL-CCNC: 18 IU/L (ref 0–40)
BILIRUB SERPL-MCNC: 0.6 MG/DL (ref 0–1.2)
BUN SERPL-MCNC: 13 MG/DL (ref 6–24)
BUN/CREAT SERPL: 14 (ref 9–20)
CALCIUM SERPL-MCNC: 10.5 MG/DL (ref 8.7–10.2)
CHLORIDE SERPL-SCNC: 102 MMOL/L (ref 96–106)
CHOLEST SERPL-MCNC: 243 MG/DL (ref 100–199)
CHOLEST/HDLC SERPL: 4 RATIO (ref 0–5)
CO2 SERPL-SCNC: 25 MMOL/L (ref 20–29)
CREAT SERPL-MCNC: 0.91 MG/DL (ref 0.76–1.27)
GLOBULIN SER CALC-MCNC: 2.4 G/DL (ref 1.5–4.5)
GLUCOSE SERPL-MCNC: 120 MG/DL (ref 65–99)
HBA1C MFR BLD: 6.4 % (ref 4.8–5.6)
HDLC SERPL-MCNC: 61 MG/DL
LDLC SERPL CALC-MCNC: 146 MG/DL (ref 0–99)
POTASSIUM SERPL-SCNC: 4.4 MMOL/L (ref 3.5–5.2)
PROT SERPL-MCNC: 7.5 G/DL (ref 6–8.5)
SODIUM SERPL-SCNC: 142 MMOL/L (ref 134–144)
TRIGL SERPL-MCNC: 202 MG/DL (ref 0–149)
VLDLC SERPL CALC-MCNC: 36 MG/DL (ref 5–40)

## 2021-11-08 NOTE — PROGRESS NOTES
CALL PATIENT (talk with his daughter) with results:    1. LDL (bad cholesterol) is notably higher. Increase atorvastatin to 40 mg every night at bedtime. Recheck lipids and CMP in 3 months.     2. A1c level for blood sugar average is the same. Continue same meds for now.     3. Calcium is little high. Will recheck with CMP in 3 months.

## 2021-11-12 DIAGNOSIS — E78.5 HYPERLIPIDEMIA, UNSPECIFIED HYPERLIPIDEMIA TYPE: Chronic | ICD-10-CM

## 2021-11-12 DIAGNOSIS — E83.52 HYPERCALCEMIA: Primary | ICD-10-CM

## 2021-11-12 RX ORDER — ATORVASTATIN CALCIUM 40 MG/1
40 TABLET, FILM COATED ORAL NIGHTLY
Qty: 30 TABLET | Refills: 3 | Status: SHIPPED | OUTPATIENT
Start: 2021-11-12 | End: 2021-11-28 | Stop reason: SDUPTHER

## 2021-11-12 NOTE — ASSESSMENT & PLAN NOTE
Inc atorvastatin to 40 mg qd. Check labs 3 months.     Lab Results   Component Value Date     (H) 11/05/2021     (H) 06/11/2021     (H) 02/05/2021    HDL 61 11/05/2021    TRIG 202 (H) 11/05/2021    CHOLHDLRATIO 4.0 11/05/2021

## 2021-11-28 DIAGNOSIS — E78.5 HYPERLIPIDEMIA, UNSPECIFIED HYPERLIPIDEMIA TYPE: Chronic | ICD-10-CM

## 2021-11-29 RX ORDER — ATORVASTATIN CALCIUM 40 MG/1
40 TABLET, FILM COATED ORAL NIGHTLY
Qty: 90 TABLET | Refills: 1 | Status: SHIPPED | OUTPATIENT
Start: 2021-11-29 | End: 2022-02-10 | Stop reason: SDUPTHER

## 2021-11-30 DIAGNOSIS — I10 ESSENTIAL HYPERTENSION: ICD-10-CM

## 2021-11-30 DIAGNOSIS — E11.9 TYPE 2 DIABETES MELLITUS WITHOUT COMPLICATION, WITHOUT LONG-TERM CURRENT USE OF INSULIN (HCC): ICD-10-CM

## 2021-11-30 RX ORDER — HYDROCHLOROTHIAZIDE 12.5 MG/1
12.5 TABLET ORAL DAILY
Qty: 30 TABLET | Refills: 3 | Status: SHIPPED | OUTPATIENT
Start: 2021-11-30 | End: 2022-02-10 | Stop reason: SDUPTHER

## 2021-11-30 RX ORDER — LOSARTAN POTASSIUM 100 MG/1
100 TABLET ORAL DAILY
Qty: 30 TABLET | Refills: 3 | Status: SHIPPED | OUTPATIENT
Start: 2021-11-30 | End: 2022-02-10 | Stop reason: SDUPTHER

## 2021-11-30 RX ORDER — SAXAGLIPTIN 5 MG/1
TABLET, FILM COATED ORAL
Qty: 30 TABLET | Refills: 3 | Status: SHIPPED | OUTPATIENT
Start: 2021-11-30 | End: 2022-02-10 | Stop reason: SDUPTHER

## 2022-01-19 ENCOUNTER — OFFICE VISIT (OUTPATIENT)
Dept: INTERNAL MEDICINE | Age: 57
End: 2022-01-19

## 2022-01-19 VITALS
TEMPERATURE: 97.5 F | BODY MASS INDEX: 30.07 KG/M2 | OXYGEN SATURATION: 98 % | DIASTOLIC BLOOD PRESSURE: 96 MMHG | HEIGHT: 72 IN | WEIGHT: 222 LBS | SYSTOLIC BLOOD PRESSURE: 114 MMHG | HEART RATE: 98 BPM

## 2022-01-19 DIAGNOSIS — E11.9 TYPE 2 DIABETES MELLITUS WITHOUT COMPLICATION, WITHOUT LONG-TERM CURRENT USE OF INSULIN: Chronic | ICD-10-CM

## 2022-01-19 DIAGNOSIS — I10 PRIMARY HYPERTENSION: Primary | Chronic | ICD-10-CM

## 2022-01-19 LAB — GLUCOSE BLDC GLUCOMTR-MCNC: 120 MG/DL (ref 70–130)

## 2022-01-19 PROCEDURE — 82962 GLUCOSE BLOOD TEST: CPT | Performed by: INTERNAL MEDICINE

## 2022-01-19 PROCEDURE — 99214 OFFICE O/P EST MOD 30 MIN: CPT | Performed by: INTERNAL MEDICINE

## 2022-01-19 NOTE — ASSESSMENT & PLAN NOTE
Monitor blood pressure twice a day and send readings in 1 week.  Advised him to take his blood pressure medications daily without forgetting.  Decrease sodium intake.  I did recommend doing a sleep study but he defers for now.  I recommended avoiding or minimizing alcohol intake.  Recommended weight loss and regular cardiovascular exercise.    BP Readings from Last 3 Encounters:   01/19/22 114/96   11/05/21 110/84   10/01/21 130/89

## 2022-01-19 NOTE — PROGRESS NOTES
"    I N T E R N A L  M E D I C I N E  J U N O H  K I M,  M D      ENCOUNTER DATE:  01/19/2022    Emil aWtts / 56 y.o. / male      CHIEF COMPLAINT / REASON FOR OFFICE VISIT     Hypertension      ASSESSMENT & PLAN     Problem List Items Addressed This Visit        High    HTN (hypertension) - Primary (Chronic)    Overview     Continue losartan 100 mg and HCTZ 12.5 mg qd.          Current Assessment & Plan     Monitor blood pressure twice a day and send readings in 1 week.  Advised him to take his blood pressure medications daily without forgetting.  Decrease sodium intake.  I did recommend doing a sleep study but he defers for now.  I recommended avoiding or minimizing alcohol intake.  Recommended weight loss and regular cardiovascular exercise.    BP Readings from Last 3 Encounters:   01/19/22 114/96   11/05/21 110/84   10/01/21 130/89             Relevant Medications    losartan (COZAAR) 100 MG tablet    hydroCHLOROthiazide (HYDRODIURIL) 12.5 MG tablet       Medium    DM type 2 (diabetes mellitus, type 2) (HCC) (Chronic)    Overview     Continue metformin  mg qd, Onglyza 5 mg qd and Jardiance 25 mg qd.          Relevant Medications    glucose blood test strip    Lancet Devices (ACCU-CHEK SOFTCLIX) lancets    Blood Glucose Monitoring Suppl (ONE TOUCH ULTRA 2) w/Device kit    metFORMIN ER (GLUCOPHAGE-XR) 500 MG 24 hr tablet    empagliflozin (Jardiance) 25 MG tablet tablet    Onglyza 5 MG tablet    losartan (COZAAR) 100 MG tablet        No orders of the defined types were placed in this encounter.    No orders of the defined types were placed in this encounter.      SUMMARY/DISCUSSION  •       Next Appointment with me: 3/11/2022    No follow-ups on file.      VITAL SIGNS     Visit Vitals  /96 (BP Location: Left arm)   Pulse 98   Temp 97.5 °F (36.4 °C)   Ht 182.9 cm (72\")   Wt 101 kg (222 lb)   SpO2 98%   BMI 30.11 kg/m²       BP Readings from Last 3 Encounters:   01/19/22 114/96   11/05/21 110/84 "   10/01/21 130/89     Wt Readings from Last 3 Encounters:   01/19/22 101 kg (222 lb)   11/05/21 90.7 kg (200 lb)   08/25/21 95.7 kg (211 lb)     Body mass index is 30.11 kg/m².      MEDICATIONS AT THE TIME OF OFFICE VISIT     Current Outpatient Medications on File Prior to Visit   Medication Sig   • Ascorbic Acid (SHARIFA-C PO) Take  by mouth.   • atorvastatin (LIPITOR) 40 MG tablet Take 1 tablet by mouth Every Night.   • Blood Glucose Monitoring Suppl (ONE TOUCH ULTRA 2) w/Device kit Use as instructed.   • cholecalciferol (VITAMIN D3) 25 MCG (1000 UT) tablet Take 1,000 Units by mouth Daily.   • empagliflozin (Jardiance) 25 MG tablet tablet Take 1 tablet by mouth Every Morning Before Breakfast.   • glucose blood test strip Test one time daily before breakfast   • hydroCHLOROthiazide (HYDRODIURIL) 12.5 MG tablet TAKE 1 TABLET BY MOUTH DAILY   • Lancet Devices (ACCU-CHEK SOFTCLIX) lancets Test one time daily   • losartan (COZAAR) 100 MG tablet TAKE 1 TABLET BY MOUTH DAILY   • metFORMIN ER (GLUCOPHAGE-XR) 500 MG 24 hr tablet Take 1 tablet by mouth Daily With Breakfast.   • Omega-3 Fatty Acids (FISH OIL) 1000 MG capsule capsule Take  by mouth Daily With Breakfast.   • Onglyza 5 MG tablet TAKE 1 TABLET BY MOUTH DAILY WITH BREAKFAST     No current facility-administered medications on file prior to visit.          HISTORY OF PRESENT ILLNESS      Outside blood pressure was noted to be greater than 150/100.  Patient denies having had blurry vision chest pain or headaches.  When he checked his blood pressure this morning it was about the same.  Patient reports not being fully compliant with his medications.  He generally misses 1 or 2 days of medication weekly.  He normally takes losartan 100 mg and hydrochlorothiazide 12.5 mg.  He is also on Jardiance 25 mg, metformin  mg and Onglyza 5 mg daily for diabetes.  He has not been restricting his sodium.  He tends to eat salty [place] food.  He also drinks once or twice weekly.   He likely has symptoms of sleep apnea but defers testing at this time. Glucose 120.       Patient Care Team:  Al Francisco MD as PCP - General (Internal Medicine)    REVIEW OF SYSTEMS     Review of Systems       PHYSICAL EXAMINATION     Physical Exam  No acute distress, diaphoretic, denies any chest pain or shortness of breath  Cardiovascular: Normal rate, regular rhythm.   Alert with normal thought and judgment.         REVIEWED DATA     Labs:     Lab Results   Component Value Date     11/05/2021    K 4.4 11/05/2021    CALCIUM 10.5 (H) 11/05/2021    AST 18 11/05/2021    ALT 15 11/05/2021    BUN 13 11/05/2021    CREATININE 0.91 11/05/2021    CREATININE 0.88 06/11/2021    CREATININE 0.94 02/05/2021    EGFRIFNONA 95 11/05/2021    EGFRIFAFRI 110 11/05/2021       Lab Results   Component Value Date    HGBA1C 6.4 (H) 11/05/2021    HGBA1C 6.40 (H) 06/11/2021    HGBA1C 6.70 (H) 02/05/2021       Lab Results   Component Value Date     (H) 11/05/2021     (H) 06/11/2021    HDL 61 11/05/2021    TRIG 202 (H) 11/05/2021       Lab Results   Component Value Date    TSH 2.940 01/19/2018    TSH 3.010 05/19/2017    TSH 5.630 (H) 01/20/2017    FREET4 1.24 01/19/2018    FREET4 1.19 05/19/2017    FREET4 1.27 01/20/2017       No results found for: WBC, HGB, PLT    Lab Results   Component Value Date    MICROALBUR <3.0 02/05/2021           Imaging:           Medical Tests:           Summary of old records / correspondence / consultant report:           Request outside records:             *Examiner was wearing KN95 mask and eye protection during the entire duration of the visit. Patient was masked the entire time. Minimum social distance of 6 ft maintained entire visit except if physical contact was necessary as documented.     **Dragon Disclaimer: Much of this encounter note is an electronic transcription/translation of spoken language to printed text. The electronic translation of spoken language may permit erroneous,  or at times, nonsensical words or phrases to be inadvertently transcribed. Although I have reviewed the note for such errors, some may still exist.       Template created by Cheryle Francisco MD

## 2022-02-10 DIAGNOSIS — E11.9 TYPE 2 DIABETES MELLITUS WITHOUT COMPLICATION, WITHOUT LONG-TERM CURRENT USE OF INSULIN: ICD-10-CM

## 2022-02-10 DIAGNOSIS — E78.5 HYPERLIPIDEMIA, UNSPECIFIED HYPERLIPIDEMIA TYPE: Chronic | ICD-10-CM

## 2022-02-10 DIAGNOSIS — I10 ESSENTIAL HYPERTENSION: ICD-10-CM

## 2022-02-10 RX ORDER — LOSARTAN POTASSIUM 100 MG/1
100 TABLET ORAL DAILY
Qty: 30 TABLET | Refills: 5 | Status: SHIPPED | OUTPATIENT
Start: 2022-02-10 | End: 2022-03-11

## 2022-02-10 RX ORDER — METFORMIN HYDROCHLORIDE 500 MG/1
500 TABLET, EXTENDED RELEASE ORAL
Qty: 120 TABLET | Refills: 5 | Status: SHIPPED | OUTPATIENT
Start: 2022-02-10 | End: 2022-10-06 | Stop reason: SDUPTHER

## 2022-02-10 RX ORDER — ATORVASTATIN CALCIUM 40 MG/1
40 TABLET, FILM COATED ORAL NIGHTLY
Qty: 90 TABLET | Refills: 1 | Status: SHIPPED | OUTPATIENT
Start: 2022-02-10 | End: 2022-10-06 | Stop reason: SDUPTHER

## 2022-02-10 RX ORDER — HYDROCHLOROTHIAZIDE 12.5 MG/1
12.5 TABLET ORAL DAILY
Qty: 30 TABLET | Refills: 5 | Status: SHIPPED | OUTPATIENT
Start: 2022-02-10 | End: 2022-10-06 | Stop reason: SDUPTHER

## 2022-03-11 ENCOUNTER — OFFICE VISIT (OUTPATIENT)
Dept: INTERNAL MEDICINE | Age: 57
End: 2022-03-11

## 2022-03-11 VITALS
HEART RATE: 80 BPM | DIASTOLIC BLOOD PRESSURE: 90 MMHG | WEIGHT: 232 LBS | BODY MASS INDEX: 31.42 KG/M2 | HEIGHT: 72 IN | OXYGEN SATURATION: 96 % | SYSTOLIC BLOOD PRESSURE: 128 MMHG | TEMPERATURE: 97.5 F

## 2022-03-11 DIAGNOSIS — Z01.84 IMMUNITY STATUS TESTING: ICD-10-CM

## 2022-03-11 DIAGNOSIS — E78.2 MIXED HYPERLIPIDEMIA: Chronic | ICD-10-CM

## 2022-03-11 DIAGNOSIS — I10 PRIMARY HYPERTENSION: Primary | ICD-10-CM

## 2022-03-11 DIAGNOSIS — E11.9 TYPE 2 DIABETES MELLITUS WITHOUT COMPLICATION, WITHOUT LONG-TERM CURRENT USE OF INSULIN: ICD-10-CM

## 2022-03-11 DIAGNOSIS — K76.0 FATTY LIVER: ICD-10-CM

## 2022-03-11 PROCEDURE — 99214 OFFICE O/P EST MOD 30 MIN: CPT | Performed by: INTERNAL MEDICINE

## 2022-03-11 RX ORDER — OLMESARTAN MEDOXOMIL 40 MG/1
40 TABLET ORAL DAILY
Qty: 30 TABLET | Refills: 3 | Status: SHIPPED | OUTPATIENT
Start: 2022-03-11 | End: 2022-05-31

## 2022-03-11 NOTE — PROGRESS NOTES
I N T E R N A L  M E D I C I N E  J U N O H  K I M,  M D      ENCOUNTER DATE:  03/11/2022    Emil Watts / 56 y.o. / male      CHIEF COMPLAINT / REASON FOR OFFICE VISIT     Diabetes, Hypertension, and Hyperlipidemia      ASSESSMENT & PLAN     Problem List Items Addressed This Visit        High    HTN (hypertension) - Primary (Chronic)    Current Assessment & Plan     BP Readings from Last 3 Encounters:   03/11/22 128/90   01/19/22 114/96   11/05/21 110/84        DBP remains high here and home.   Change losartan 100 mg to olmesartan 40 mg qd.   Continue HCTZ 12.5 mg.            Relevant Medications    hydroCHLOROthiazide (HYDRODIURIL) 12.5 MG tablet    olmesartan (BENICAR) 40 MG tablet    DM type 2 (diabetes mellitus, type 2) (HCC) (Chronic)    Overview     Continue metformin  mg qd, Onglyza 5 mg qd and Jardiance 25 mg qd.            Relevant Medications    glucose blood test strip    Lancet Devices (ACCU-CHEK SOFTCLIX) lancets    Blood Glucose Monitoring Suppl (ONE TOUCH ULTRA 2) w/Device kit    metFORMIN ER (GLUCOPHAGE-XR) 500 MG 24 hr tablet    empagliflozin (Jardiance) 25 MG tablet tablet    SAXagliptin (Onglyza) 5 MG tablet    olmesartan (BENICAR) 40 MG tablet    Other Relevant Orders    Hemoglobin A1c    Hepatitis B Surf Antibody Quant       Medium    Hyperlipidemia (Chronic)    Overview     Continue atorvastatin 40 mg qd.            Current Assessment & Plan     Lab Results   Component Value Date    LDL 63 02/18/2022     (H) 11/05/2021     (H) 06/11/2021    HDL 49 02/18/2022    TRIG 181 (H) 02/18/2022    CHOLHDLRATIO 2.9 02/18/2022        LDL cholesterol improved with increase in atorvastatin to 40 mg.   Continue atorvastatin 40 mg qd.            Relevant Medications    atorvastatin (LIPITOR) 40 MG tablet       Low    Fatty liver (Chronic)    Overview     Fatty liver on US. Other liver labs negative.   Seen by GI : Fatty Liver (8/2019)           Current Assessment & Plan     Check  "antibody titer for Hep B.            Relevant Orders    Hepatitis B Surf Antibody Quant      Other Visit Diagnoses     Immunity status testing        Relevant Orders    Hepatitis B Surf Antibody Quant        Orders Placed This Encounter   Procedures   • Hemoglobin A1c   • Hepatitis B Surf Antibody Quant     New Medications Ordered This Visit   Medications   • olmesartan (BENICAR) 40 MG tablet     Sig: Take 1 tablet by mouth Daily.     Dispense:  30 tablet     Refill:  3       SUMMARY/DISCUSSION  •       Next Appointment with me: Visit date not found    Return in about 4 months (around 7/11/2022) for Diabetes, Hypertension.        VITAL SIGNS     Visit Vitals  /90 (BP Location: Left arm)   Pulse 80   Temp 97.5 °F (36.4 °C)   Ht 182.9 cm (72\")   Wt 105 kg (232 lb)   SpO2 96%   BMI 31.46 kg/m²       BP Readings from Last 3 Encounters:   03/11/22 128/90   01/19/22 114/96   11/05/21 110/84     Wt Readings from Last 3 Encounters:   03/11/22 105 kg (232 lb)   01/19/22 101 kg (222 lb)   11/05/21 90.7 kg (200 lb)     Body mass index is 31.46 kg/m².    Blood pressure readings recorded on patient flowsheet:  No flowsheet data found.     MEDICATIONS AT THE TIME OF OFFICE VISIT     Current Outpatient Medications on File Prior to Visit   Medication Sig   • Ascorbic Acid (SHARIFA-C PO) Take  by mouth.   • atorvastatin (LIPITOR) 40 MG tablet Take 1 tablet by mouth Every Night.   • Blood Glucose Monitoring Suppl (ONE TOUCH ULTRA 2) w/Device kit Use as instructed.   • cholecalciferol (VITAMIN D3) 25 MCG (1000 UT) tablet Take 1,000 Units by mouth Daily.   • empagliflozin (Jardiance) 25 MG tablet tablet Take 1 tablet by mouth Every Morning Before Breakfast.   • glucose blood test strip Test one time daily before breakfast   • hydroCHLOROthiazide (HYDRODIURIL) 12.5 MG tablet Take 1 tablet by mouth Daily.   • Lancet Devices (ACCU-CHEK SOFTCLIX) lancets Test one time daily   • losartan (COZAAR) 100 MG tablet Take 1 tablet by mouth " Daily.   • metFORMIN ER (GLUCOPHAGE-XR) 500 MG 24 hr tablet Take 1 tablet by mouth Daily With Breakfast.   • Omega-3 Fatty Acids (FISH OIL) 1000 MG capsule capsule Take  by mouth Daily With Breakfast.   • SAXagliptin (Onglyza) 5 MG tablet Take 1 tablet by mouth Daily With Breakfast.     No current facility-administered medications on file prior to visit.         HISTORY OF PRESENT ILLNESS     UTILIZED [place]      Blood sugar readings recorded on patient's flowsheet:  No flowsheet data found.     Diastolic blood pressure at home continues to remain greater than 90.  Currently takes losartan 100 mg and hydrochlorothiazide 12.5 mg.  Diabetes remains relatively stable.  On Jardiance 25, Onglyza 5 mg and Metformin  mg daily.  LDL improved significantly with increase in atorvastatin to 40 mg without significant side effects.  He has history of fatty liver.  He thinks he had hepatitis B series including but is not certain.  He has completed hepatitis A series.       Wt Readings from Last 3 Encounters:   03/11/22 105 kg (232 lb)   01/19/22 101 kg (222 lb)   11/05/21 90.7 kg (200 lb)       REVIEW OF SYSTEMS     Wt gain  No chest pain or sob      PHYSICAL EXAMINATION     Physical Exam  Weight gain noted; obese   Cardiovascular: Normal rate, regular rhythm.  Pulm/Chest: Effort normal, breath sounds normal.       REVIEWED DATA     Labs:       Lab Results   Component Value Date     02/18/2022    K 4.7 02/18/2022    CALCIUM 10.1 02/18/2022    AST 24 02/18/2022    ALT 33 02/18/2022    BUN 21 02/18/2022    CREATININE 0.87 02/18/2022    CREATININE 0.91 11/05/2021    CREATININE 0.88 06/11/2021    EGFRIFNONA 98 02/18/2022    EGFRIFAFRI 113 02/18/2022       Lab Results   Component Value Date    HGBA1C 6.4 (H) 11/05/2021    HGBA1C 6.40 (H) 06/11/2021    HGBA1C 6.70 (H) 02/05/2021       Lab Results   Component Value Date    LDL 63 02/18/2022     (H) 11/05/2021    HDL 49 02/18/2022    TRIG 181 (H) 02/18/2022        Lab Results   Component Value Date    TSH 2.940 01/19/2018    TSH 3.010 05/19/2017    TSH 5.630 (H) 01/20/2017    FREET4 1.24 01/19/2018    FREET4 1.19 05/19/2017    FREET4 1.27 01/20/2017       No results found for: WBC, HGB, PLT    Lab Results   Component Value Date    MICROALBUR <3.0 02/05/2021           Imaging:           Medical Tests:           Summary of old records / correspondence / consultant report:           Request outside records:           *Examiner was wearing KN95 mask and eye protection during the entire duration of the visit. Patient was masked the entire time. Minimum social distance of 6 ft maintained entire visit except if physical contact was necessary as documented.       Template created by Cheryle Francisco MD

## 2022-03-11 NOTE — ASSESSMENT & PLAN NOTE
Lab Results   Component Value Date    LDL 63 02/18/2022     (H) 11/05/2021     (H) 06/11/2021    HDL 49 02/18/2022    TRIG 181 (H) 02/18/2022    CHOLHDLRATIO 2.9 02/18/2022        LDL cholesterol improved with increase in atorvastatin to 40 mg.   Continue atorvastatin 40 mg qd.

## 2022-03-11 NOTE — ASSESSMENT & PLAN NOTE
BP Readings from Last 3 Encounters:   03/11/22 128/90   01/19/22 114/96   11/05/21 110/84        DBP remains high here and home.   Change losartan 100 mg to olmesartan 40 mg qd.   Continue HCTZ 12.5 mg.

## 2022-03-11 NOTE — PATIENT INSTRUCTIONS
** IMPORTANT MESSAGE FROM DR. CARO **    In our office, your satisfaction is VERY important to us.     You may receive a survey from [facility] by mail or E-mail for you to provide feedback about your visit. This information is invaluable for me to know what we can do to improve our services.     I ask that you please take a few minutes to complete the survey and let us know how we are doing in serving your needs. (You may receive the survey more than once for multiple visits)    Thank You !    Dr. Caro    _________________________________________________________________________________________________________________________      ** ADDITIONAL INSTRUCTION / REMINDERS FROM DR. CARO **    Change losartan 100 mg to olmesartan 40 mg qd for better blood pressure control. Monitor blood pressure closely. Goal blood pressure < 130/80.

## 2022-03-12 LAB
HBA1C MFR BLD: 6.8 % (ref 4.8–5.6)
HBV SURFACE AB SER-ACNC: <3.1 MIU/ML

## 2022-03-14 NOTE — PROGRESS NOTES
CALL PATIENT (talk with daughter or through [place] )with results:    1. A1c level for blood sugar average is higher. Increase metformin  mg to twice daily.     2. He does NOT have immunity to hepatitis B. Recommend vaccination series here or pharmacy.

## 2022-04-12 RX ORDER — EMPAGLIFLOZIN 25 MG/1
TABLET, FILM COATED ORAL
Qty: 30 TABLET | Refills: 3 | Status: SHIPPED | OUTPATIENT
Start: 2022-04-12 | End: 2022-10-06 | Stop reason: SDUPTHER

## 2022-05-28 DIAGNOSIS — I10 PRIMARY HYPERTENSION: ICD-10-CM

## 2022-05-28 DIAGNOSIS — E11.9 TYPE 2 DIABETES MELLITUS WITHOUT COMPLICATION, WITHOUT LONG-TERM CURRENT USE OF INSULIN: ICD-10-CM

## 2022-05-31 RX ORDER — OLMESARTAN MEDOXOMIL 40 MG/1
40 TABLET ORAL DAILY
Qty: 30 TABLET | Refills: 5 | Status: SHIPPED | OUTPATIENT
Start: 2022-05-31 | End: 2022-08-18 | Stop reason: SDUPTHER

## 2022-07-27 ENCOUNTER — TELEPHONE (OUTPATIENT)
Dept: INTERNAL MEDICINE | Age: 57
End: 2022-07-27

## 2022-07-27 NOTE — TELEPHONE ENCOUNTER
Caller: David Watts (ON  [facility])    Relationship to patient: Emergency Contact    Best call back number: [phone]    Chief complaint: PATIENT CANNOT DO 8/3/22 APPOINTMENT AND DOES NOT WANT TO SEE ANYONE ELSE. SO BOOKED ON 1/18/23. PATIENT CANNOT COME IN UNTIL AFTER MID AUGUST. THIS APPOINTMENT IS FOR HYPERTENSION AND DIABETES FOLLOW UP.     Type of visit: OFFICE VISIT     Requested date: AFTER MID AUGUST     If rescheduling, when is the original appointment: 1/18/23    Additional notes:DAUGHTER ON  [facility] STATES THAT THEY WILL SEE ANOTHER PROVIDER AT THE OFFICE ONLY IF DR CARO RECOMMENDS PATIENT GET SEEN SOONER BY ONE OF THE OTHER PROVIDERS.

## 2022-08-18 DIAGNOSIS — E11.9 TYPE 2 DIABETES MELLITUS WITHOUT COMPLICATION, WITHOUT LONG-TERM CURRENT USE OF INSULIN: ICD-10-CM

## 2022-08-18 DIAGNOSIS — I10 PRIMARY HYPERTENSION: ICD-10-CM

## 2022-08-19 RX ORDER — OLMESARTAN MEDOXOMIL 40 MG/1
40 TABLET ORAL DAILY
Qty: 90 TABLET | Refills: 0 | Status: SHIPPED | OUTPATIENT
Start: 2022-08-19 | End: 2022-10-06 | Stop reason: SDUPTHER

## 2022-09-16 DIAGNOSIS — I10 PRIMARY HYPERTENSION: ICD-10-CM

## 2022-09-16 DIAGNOSIS — E11.9 TYPE 2 DIABETES MELLITUS WITHOUT COMPLICATION, WITHOUT LONG-TERM CURRENT USE OF INSULIN: ICD-10-CM

## 2022-09-16 DIAGNOSIS — I10 ESSENTIAL HYPERTENSION: ICD-10-CM

## 2022-09-16 DIAGNOSIS — E78.5 HYPERLIPIDEMIA, UNSPECIFIED HYPERLIPIDEMIA TYPE: Chronic | ICD-10-CM

## 2022-09-16 RX ORDER — METFORMIN HYDROCHLORIDE 500 MG/1
500 TABLET, EXTENDED RELEASE ORAL
Qty: 90 TABLET | Refills: 3 | OUTPATIENT
Start: 2022-09-16

## 2022-09-16 RX ORDER — ATORVASTATIN CALCIUM 40 MG/1
40 TABLET, FILM COATED ORAL NIGHTLY
Qty: 90 TABLET | Refills: 3 | OUTPATIENT
Start: 2022-09-16

## 2022-09-16 RX ORDER — HYDROCHLOROTHIAZIDE 12.5 MG/1
12.5 TABLET ORAL DAILY
Qty: 90 TABLET | Refills: 1 | OUTPATIENT
Start: 2022-09-16

## 2022-09-16 RX ORDER — OLMESARTAN MEDOXOMIL 40 MG/1
40 TABLET ORAL DAILY
Qty: 90 TABLET | Refills: 3 | OUTPATIENT
Start: 2022-09-16

## 2022-09-26 ENCOUNTER — OFFICE VISIT (OUTPATIENT)
Dept: INTERNAL MEDICINE | Age: 57
End: 2022-09-26

## 2022-09-26 VITALS
WEIGHT: 236 LBS | TEMPERATURE: 96.7 F | BODY MASS INDEX: 31.97 KG/M2 | HEIGHT: 72 IN | DIASTOLIC BLOOD PRESSURE: 90 MMHG | OXYGEN SATURATION: 96 % | RESPIRATION RATE: 16 BRPM | HEART RATE: 95 BPM | SYSTOLIC BLOOD PRESSURE: 128 MMHG

## 2022-09-26 DIAGNOSIS — I10 PRIMARY HYPERTENSION: Primary | ICD-10-CM

## 2022-09-26 DIAGNOSIS — E11.9 TYPE 2 DIABETES MELLITUS WITHOUT COMPLICATION, WITHOUT LONG-TERM CURRENT USE OF INSULIN: ICD-10-CM

## 2022-09-26 DIAGNOSIS — E78.2 MIXED HYPERLIPIDEMIA: ICD-10-CM

## 2022-09-26 PROCEDURE — 99214 OFFICE O/P EST MOD 30 MIN: CPT

## 2022-09-26 NOTE — PROGRESS NOTES
"    I N T E R N A L  M E D I C I N E  Letha Qureshi, EMELI    ENCOUNTER DATE:  09/26/2022    Emil Watts / 57 y.o. / male      CHIEF COMPLAINT / REASON FOR OFFICE VISIT     Med Refill      ASSESSMENT & PLAN     Diagnoses and all orders for this visit:    1. Primary hypertension (Primary)  -     CBC & Differential  -     Comprehensive Metabolic Panel    2. Mixed hyperlipidemia  Overview:  Continue atorvastatin 40 mg qd.     Orders:  -     Lipid Panel With / Chol / HDL Ratio    3. Type 2 diabetes mellitus without complication, without long-term current use of insulin (HCC)  Overview:  Continue metformin  mg qd, Onglyza 5 mg qd and Jardiance 25 mg qd.     Orders:  -     Hemoglobin A1c       SUMMARY/DISCUSSION  • Agreeable to update labs today.      Next Appointment with me: Visit date not found    Return for Next scheduled follow up + annual physical before end of 2022.      VITAL SIGNS     Visit Vitals  /90 (BP Location: Right arm, Patient Position: Sitting, Cuff Size: Large Adult)   Pulse 95   Temp 96.7 °F (35.9 °C) (Temporal)   Resp 16   Ht 182.9 cm (72.01\")   Wt 107 kg (236 lb)   SpO2 96%   BMI 32.00 kg/m²             Wt Readings from Last 3 Encounters:   09/26/22 107 kg (236 lb)   03/11/22 105 kg (232 lb)   01/19/22 101 kg (222 lb)     Body mass index is 32 kg/m².        MEDICATIONS AT THE TIME OF OFFICE VISIT     Current Outpatient Medications on File Prior to Visit   Medication Sig Dispense Refill   • Ascorbic Acid (SHARIFA-C PO) Take  by mouth.     • atorvastatin (LIPITOR) 40 MG tablet Take 1 tablet by mouth Every Night. 90 tablet 1   • Blood Glucose Monitoring Suppl (ONE TOUCH ULTRA 2) w/Device kit Use as instructed. 1 each 5   • cholecalciferol (VITAMIN D3) 25 MCG (1000 UT) tablet Take 1,000 Units by mouth Daily.     • glucose blood test strip Test one time daily before breakfast 100 each 0   • hydroCHLOROthiazide (HYDRODIURIL) 12.5 MG tablet Take 1 tablet by mouth Daily. 30 tablet 5   • Jardiance 25 " MG tablet tablet TAKE 1 TABLET BY MOUTH EVERY MORNING BEFORE BREAKFAST 30 tablet 3   • Lancet Devices (ACCU-CHEK SOFTCLIX) lancets Test one time daily 100 each 0   • metFORMIN ER (GLUCOPHAGE-XR) 500 MG 24 hr tablet Take 1 tablet by mouth Daily With Breakfast. 120 tablet 5   • olmesartan (BENICAR) 40 MG tablet Take 1 tablet by mouth Daily. 90 tablet 0   • Omega-3 Fatty Acids (FISH OIL) 1000 MG capsule capsule Take  by mouth Daily With Breakfast.     • SAXagliptin (Onglyza) 5 MG tablet Take 1 tablet by mouth Daily With Breakfast. 90 tablet 0     No current facility-administered medications on file prior to visit.        HISTORY OF PRESENT ILLNESS     HLD: Atorvastatin 40 mg nightly.    Type 2 diabetes: 03/2022 A1C was 6.8.  Does not check sugars at home.  Remains on Jardiance 25 mg, metformin 500 mg daily, saxagliptin 5 mg daily.  No episodes of hypoglycemia.      HTN: HCTZ 12.5 mg, olmestartan 40 mg.  BP is averaging 130/90s at home.        Patient Care Team:  Al Francisco MD as PCP - General (Internal Medicine)    REVIEW OF SYSTEMS     Review of Systems   Constitutional: Negative for chills, fever and unexpected weight change.   Respiratory: Negative for cough, chest tightness and shortness of breath.    Cardiovascular: Negative for chest pain, palpitations and leg swelling.   Neurological: Negative for dizziness, weakness, light-headedness and headaches.   Psychiatric/Behavioral: The patient is not nervous/anxious.           PHYSICAL EXAMINATION     Physical Exam  Vitals reviewed.   Constitutional:       General: He is not in acute distress.     Appearance: Normal appearance. He is not ill-appearing, toxic-appearing or diaphoretic.   HENT:      Head: Normocephalic and atraumatic.   Cardiovascular:      Rate and Rhythm: Normal rate and regular rhythm.      Heart sounds: Normal heart sounds.   Pulmonary:      Effort: Pulmonary effort is normal.      Breath sounds: Normal breath sounds.   Musculoskeletal:      Right  lower leg: No edema.      Left lower leg: No edema.   Neurological:      Mental Status: He is alert and oriented to person, place, and time. Mental status is at baseline.   Psychiatric:         Mood and Affect: Mood normal.         Behavior: Behavior normal.         Thought Content: Thought content normal.         Judgment: Judgment normal.           REVIEWED DATA     Labs:           Imaging:            Medical Tests:           Summary of old records / correspondence / consultant report:           Request outside records:

## 2022-09-27 LAB
ALBUMIN SERPL-MCNC: 5 G/DL (ref 3.5–5.2)
ALBUMIN/GLOB SERPL: 2.6 G/DL
ALP SERPL-CCNC: 135 U/L (ref 39–117)
ALT SERPL-CCNC: 36 U/L (ref 1–41)
AST SERPL-CCNC: 20 U/L (ref 1–40)
BASOPHILS # BLD AUTO: 0.08 10*3/MM3 (ref 0–0.2)
BASOPHILS NFR BLD AUTO: 1.4 % (ref 0–1.5)
BILIRUB SERPL-MCNC: 0.6 MG/DL (ref 0–1.2)
BUN SERPL-MCNC: 10 MG/DL (ref 6–20)
BUN/CREAT SERPL: 14.5 (ref 7–25)
CALCIUM SERPL-MCNC: 10.3 MG/DL (ref 8.6–10.5)
CHLORIDE SERPL-SCNC: 101 MMOL/L (ref 98–107)
CHOLEST SERPL-MCNC: 262 MG/DL (ref 0–200)
CHOLEST/HDLC SERPL: 6.89 {RATIO}
CO2 SERPL-SCNC: 23.4 MMOL/L (ref 22–29)
CREAT SERPL-MCNC: 0.69 MG/DL (ref 0.76–1.27)
EGFRCR SERPLBLD CKD-EPI 2021: 109.3 ML/MIN/1.73
EOSINOPHIL # BLD AUTO: 0.37 10*3/MM3 (ref 0–0.4)
EOSINOPHIL NFR BLD AUTO: 6.5 % (ref 0.3–6.2)
ERYTHROCYTE [DISTWIDTH] IN BLOOD BY AUTOMATED COUNT: 13.1 % (ref 12.3–15.4)
GLOBULIN SER CALC-MCNC: 1.9 GM/DL
GLUCOSE SERPL-MCNC: 140 MG/DL (ref 65–99)
HBA1C MFR BLD: 9 % (ref 4.8–5.6)
HCT VFR BLD AUTO: 49.4 % (ref 37.5–51)
HDLC SERPL-MCNC: 38 MG/DL (ref 40–60)
HGB BLD-MCNC: 16.2 G/DL (ref 13–17.7)
IMM GRANULOCYTES # BLD AUTO: 0.03 10*3/MM3 (ref 0–0.05)
IMM GRANULOCYTES NFR BLD AUTO: 0.5 % (ref 0–0.5)
LDLC SERPL CALC-MCNC: 92 MG/DL (ref 0–100)
LYMPHOCYTES # BLD AUTO: 1.58 10*3/MM3 (ref 0.7–3.1)
LYMPHOCYTES NFR BLD AUTO: 27.8 % (ref 19.6–45.3)
MCH RBC QN AUTO: 29.7 PG (ref 26.6–33)
MCHC RBC AUTO-ENTMCNC: 32.8 G/DL (ref 31.5–35.7)
MCV RBC AUTO: 90.6 FL (ref 79–97)
MONOCYTES # BLD AUTO: 0.34 10*3/MM3 (ref 0.1–0.9)
MONOCYTES NFR BLD AUTO: 6 % (ref 5–12)
NEUTROPHILS # BLD AUTO: 3.28 10*3/MM3 (ref 1.7–7)
NEUTROPHILS NFR BLD AUTO: 57.8 % (ref 42.7–76)
NRBC BLD AUTO-RTO: 0 /100 WBC (ref 0–0.2)
PLATELET # BLD AUTO: 265 10*3/MM3 (ref 140–450)
POTASSIUM SERPL-SCNC: 3.8 MMOL/L (ref 3.5–5.2)
PROT SERPL-MCNC: 6.9 G/DL (ref 6–8.5)
RBC # BLD AUTO: 5.45 10*6/MM3 (ref 4.14–5.8)
SODIUM SERPL-SCNC: 138 MMOL/L (ref 136–145)
TRIGL SERPL-MCNC: 787 MG/DL (ref 0–150)
VLDLC SERPL CALC-MCNC: 132 MG/DL (ref 5–40)
WBC # BLD AUTO: 5.68 10*3/MM3 (ref 3.4–10.8)

## 2022-10-06 ENCOUNTER — OFFICE VISIT (OUTPATIENT)
Dept: INTERNAL MEDICINE | Age: 57
End: 2022-10-06

## 2022-10-06 VITALS
HEART RATE: 86 BPM | DIASTOLIC BLOOD PRESSURE: 90 MMHG | OXYGEN SATURATION: 98 % | SYSTOLIC BLOOD PRESSURE: 118 MMHG | HEIGHT: 72 IN | TEMPERATURE: 96.8 F | WEIGHT: 241 LBS | BODY MASS INDEX: 32.64 KG/M2

## 2022-10-06 DIAGNOSIS — I10 ESSENTIAL HYPERTENSION: ICD-10-CM

## 2022-10-06 DIAGNOSIS — E11.8 TYPE 2 DIABETES MELLITUS WITH COMPLICATION, WITHOUT LONG-TERM CURRENT USE OF INSULIN: ICD-10-CM

## 2022-10-06 DIAGNOSIS — E78.5 HYPERLIPIDEMIA, UNSPECIFIED HYPERLIPIDEMIA TYPE: Chronic | ICD-10-CM

## 2022-10-06 PROCEDURE — 99214 OFFICE O/P EST MOD 30 MIN: CPT

## 2022-10-06 RX ORDER — METFORMIN HYDROCHLORIDE 500 MG/1
500 TABLET, EXTENDED RELEASE ORAL
Qty: 120 TABLET | Refills: 1 | Status: SHIPPED | OUTPATIENT
Start: 2022-10-06 | End: 2022-10-29 | Stop reason: SDUPTHER

## 2022-10-06 RX ORDER — ATORVASTATIN CALCIUM 40 MG/1
40 TABLET, FILM COATED ORAL NIGHTLY
Qty: 90 TABLET | Refills: 1 | Status: SHIPPED | OUTPATIENT
Start: 2022-10-06 | End: 2022-10-29 | Stop reason: SDUPTHER

## 2022-10-06 RX ORDER — OLMESARTAN MEDOXOMIL 40 MG/1
40 TABLET ORAL DAILY
Qty: 90 TABLET | Refills: 0 | Status: SHIPPED | OUTPATIENT
Start: 2022-10-06 | End: 2022-10-29 | Stop reason: SDUPTHER

## 2022-10-06 RX ORDER — HYDROCHLOROTHIAZIDE 12.5 MG/1
12.5 TABLET ORAL DAILY
Qty: 30 TABLET | Refills: 1 | Status: SHIPPED | OUTPATIENT
Start: 2022-10-06 | End: 2022-10-29 | Stop reason: SDUPTHER

## 2022-10-06 NOTE — PROGRESS NOTES
I N T E R N A L  M E D I C I N E  Letha Qureshi, APRN    ENCOUNTER DATE:  10/06/2022    Emil Watts / 57 y.o. / male      CHIEF COMPLAINT / REASON FOR OFFICE VISIT     Diabetes      ASSESSMENT & PLAN     Diagnoses and all orders for this visit:    1. Type 2 diabetes mellitus with complication, without long-term current use of insulin (HCC)  -     empagliflozin (Jardiance) 25 MG tablet tablet; Take 1 tablet by mouth Every Morning Before Breakfast.  Dispense: 30 tablet; Refill: 1  -     metFORMIN ER (GLUCOPHAGE-XR) 500 MG 24 hr tablet; Take 1 tablet by mouth Daily With Breakfast.  Dispense: 120 tablet; Refill: 1  -     SAXagliptin (Onglyza) 5 MG tablet; Take 1 tablet by mouth Daily With Breakfast.  Dispense: 90 tablet; Refill: 0  -     glucose blood test strip; Test one time daily before breakfast  Dispense: 100 each; Refill: 0    2. Hyperlipidemia, unspecified hyperlipidemia type  Overview:  Continue atorvastatin 40 mg qd.     Orders:  -     atorvastatin (LIPITOR) 40 MG tablet; Take 1 tablet by mouth Every Night.  Dispense: 90 tablet; Refill: 1    3. Essential hypertension  -     hydroCHLOROthiazide (HYDRODIURIL) 12.5 MG tablet; Take 1 tablet by mouth Daily.  Dispense: 30 tablet; Refill: 1  -     olmesartan (BENICAR) 40 MG tablet; Take 1 tablet by mouth Daily.  Dispense: 90 tablet; Refill: 0       SUMMARY/DISCUSSION  • Pt educated on dangers of poorly controlled type 2 diabetes/ elevated triglycerides.  Offered referral to nutritionist but declined.    • At this time, pt is agreeable to resuming diabetes medications and closely watching his diet.  Will start checking fasting blood sugars and postprandial blood sugars for discussion at follow up appointment in 1 month.    • Refills placed for all medications.  Educated on importance of reaching out to office if he has any problem obtaining medication from pharmacy.      Next Appointment with me: Visit date not found    Return in about 1 month (around 11/6/2022)  "for Diabetes.      VITAL SIGNS     Visit Vitals  /90   Pulse 86   Temp 96.8 °F (36 °C)   Ht 182.9 cm (72.01\")   Wt 109 kg (241 lb)   SpO2 98%   BMI 32.68 kg/m²             Wt Readings from Last 3 Encounters:   10/06/22 109 kg (241 lb)   09/26/22 107 kg (236 lb)   03/11/22 105 kg (232 lb)     Body mass index is 32.68 kg/m².        MEDICATIONS AT THE TIME OF OFFICE VISIT     Current Outpatient Medications on File Prior to Visit   Medication Sig Dispense Refill   • Ascorbic Acid (SHARIFA-C PO) Take  by mouth.     • Blood Glucose Monitoring Suppl (ONE TOUCH ULTRA 2) w/Device kit Use as instructed. 1 each 5   • cholecalciferol (VITAMIN D3) 25 MCG (1000 UT) tablet Take 1,000 Units by mouth Daily.     • Lancet Devices (ACCU-CHEK SOFTCLIX) lancets Test one time daily 100 each 0   • Omega-3 Fatty Acids (FISH OIL) 1000 MG capsule capsule Take  by mouth Daily With Breakfast.     • [DISCONTINUED] atorvastatin (LIPITOR) 40 MG tablet Take 1 tablet by mouth Every Night. 90 tablet 1   • [DISCONTINUED] glucose blood test strip Test one time daily before breakfast 100 each 0   • [DISCONTINUED] hydroCHLOROthiazide (HYDRODIURIL) 12.5 MG tablet Take 1 tablet by mouth Daily. 30 tablet 5   • [DISCONTINUED] Jardiance 25 MG tablet tablet TAKE 1 TABLET BY MOUTH EVERY MORNING BEFORE BREAKFAST 30 tablet 3   • [DISCONTINUED] metFORMIN ER (GLUCOPHAGE-XR) 500 MG 24 hr tablet Take 1 tablet by mouth Daily With Breakfast. 120 tablet 5   • [DISCONTINUED] olmesartan (BENICAR) 40 MG tablet Take 1 tablet by mouth Daily. 90 tablet 0   • [DISCONTINUED] SAXagliptin (Onglyza) 5 MG tablet Take 1 tablet by mouth Daily With Breakfast. 90 tablet 0     No current facility-administered medications on file prior to visit.        HISTORY OF PRESENT ILLNESS     Pt is accompanied by his daughter to today's appointment.  [place]  used at today's appointment.    Clarification provided by pt's daughter that pt has actually been off the majority of his " diabetes medications the last several months due to insurance difficulties.      Type 2 diabetes: Recent A1C is now 9.0, increased from 6.8 in March 2022.  Does not check sugars at home.  He is out of testing strips.  He was previously prescribed Jardiance 25 mg, metformin 500 mg daily, saxagliptin 5 mg daily.  No episodes of hypoglycemia.   Does drink soda but is working on cutting back/ changing to diet soda.  He has started exercising by jumping rope.      HLD: 09/2022 Triglycerides 787.  He is unsure whether he has remained on his statin medication as prescribed for the last several months.  He denies any abdominal pain, fever, nausea, vomiting.          Patient Care Team:  Al Francisco MD as PCP - General (Internal Medicine)    REVIEW OF SYSTEMS     Review of Systems   Constitutional: Negative for chills, fever and unexpected weight change.   Respiratory: Negative for cough, chest tightness and shortness of breath.    Cardiovascular: Negative for chest pain, palpitations and leg swelling.   Endocrine: Negative for polydipsia, polyphagia and polyuria.   Neurological: Negative for dizziness, weakness, light-headedness and headaches.   Psychiatric/Behavioral: The patient is not nervous/anxious.           PHYSICAL EXAMINATION     Physical Exam  Vitals reviewed.   Constitutional:       General: He is not in acute distress.     Appearance: Normal appearance. He is not ill-appearing, toxic-appearing or diaphoretic.   HENT:      Head: Normocephalic and atraumatic.   Cardiovascular:      Rate and Rhythm: Normal rate and regular rhythm.      Heart sounds: Normal heart sounds.   Pulmonary:      Effort: Pulmonary effort is normal.      Breath sounds: Normal breath sounds.   Musculoskeletal:      Right lower leg: No edema.      Left lower leg: No edema.   Neurological:      Mental Status: He is alert and oriented to person, place, and time. Mental status is at baseline.   Psychiatric:         Mood and Affect: Mood normal.          Behavior: Behavior normal.         Thought Content: Thought content normal.         Judgment: Judgment normal.           REVIEWED DATA     Labs:           Imaging:            Medical Tests:           Summary of old records / correspondence / consultant report:           Request outside records:

## 2022-10-07 DIAGNOSIS — E11.8 TYPE 2 DIABETES MELLITUS WITH COMPLICATION, WITHOUT LONG-TERM CURRENT USE OF INSULIN: Primary | ICD-10-CM

## 2022-10-07 RX ORDER — DAPAGLIFLOZIN 5 MG/1
5 TABLET, FILM COATED ORAL DAILY
Qty: 30 TABLET | Refills: 0 | Status: SHIPPED | OUTPATIENT
Start: 2022-10-07 | End: 2022-10-10

## 2022-10-10 DIAGNOSIS — E11.8 TYPE 2 DIABETES MELLITUS WITH COMPLICATION, WITHOUT LONG-TERM CURRENT USE OF INSULIN: Primary | ICD-10-CM

## 2022-10-10 RX ORDER — CANAGLIFLOZIN 100 MG/1
100 TABLET, FILM COATED ORAL DAILY
Qty: 90 TABLET | Refills: 0 | Status: SHIPPED | OUTPATIENT
Start: 2022-10-10 | End: 2022-11-10

## 2022-10-29 DIAGNOSIS — I10 ESSENTIAL HYPERTENSION: ICD-10-CM

## 2022-10-29 DIAGNOSIS — E11.8 TYPE 2 DIABETES MELLITUS WITH COMPLICATION, WITHOUT LONG-TERM CURRENT USE OF INSULIN: ICD-10-CM

## 2022-10-29 DIAGNOSIS — E78.5 HYPERLIPIDEMIA, UNSPECIFIED HYPERLIPIDEMIA TYPE: Chronic | ICD-10-CM

## 2022-11-01 RX ORDER — METFORMIN HYDROCHLORIDE 500 MG/1
500 TABLET, EXTENDED RELEASE ORAL
Qty: 90 TABLET | Refills: 0 | Status: SHIPPED | OUTPATIENT
Start: 2022-11-01 | End: 2022-11-10 | Stop reason: DRUGHIGH

## 2022-11-01 RX ORDER — OLMESARTAN MEDOXOMIL 40 MG/1
40 TABLET ORAL DAILY
Qty: 90 TABLET | Refills: 0 | Status: SHIPPED | OUTPATIENT
Start: 2022-11-01 | End: 2023-01-03 | Stop reason: SDUPTHER

## 2022-11-01 RX ORDER — HYDROCHLOROTHIAZIDE 12.5 MG/1
12.5 TABLET ORAL DAILY
Qty: 90 TABLET | Refills: 0 | Status: SHIPPED | OUTPATIENT
Start: 2022-11-01 | End: 2023-01-03 | Stop reason: SDUPTHER

## 2022-11-01 RX ORDER — ATORVASTATIN CALCIUM 40 MG/1
40 TABLET, FILM COATED ORAL NIGHTLY
Qty: 90 TABLET | Refills: 0 | Status: SHIPPED | OUTPATIENT
Start: 2022-11-01

## 2022-11-10 ENCOUNTER — OFFICE VISIT (OUTPATIENT)
Dept: INTERNAL MEDICINE | Age: 57
End: 2022-11-10

## 2022-11-10 VITALS
HEART RATE: 81 BPM | HEIGHT: 72 IN | DIASTOLIC BLOOD PRESSURE: 88 MMHG | TEMPERATURE: 97.1 F | SYSTOLIC BLOOD PRESSURE: 110 MMHG | OXYGEN SATURATION: 97 % | BODY MASS INDEX: 32.51 KG/M2 | WEIGHT: 240 LBS

## 2022-11-10 DIAGNOSIS — Z23 ENCOUNTER FOR IMMUNIZATION: ICD-10-CM

## 2022-11-10 DIAGNOSIS — E11.65 TYPE 2 DIABETES MELLITUS WITH HYPERGLYCEMIA, WITHOUT LONG-TERM CURRENT USE OF INSULIN: Primary | ICD-10-CM

## 2022-11-10 LAB
EXPIRATION DATE: ABNORMAL
HBA1C MFR BLD: 8.2 %
Lab: ABNORMAL

## 2022-11-10 PROCEDURE — 99214 OFFICE O/P EST MOD 30 MIN: CPT

## 2022-11-10 PROCEDURE — 90686 IIV4 VACC NO PRSV 0.5 ML IM: CPT

## 2022-11-10 PROCEDURE — 83036 HEMOGLOBIN GLYCOSYLATED A1C: CPT

## 2022-11-10 PROCEDURE — 36416 COLLJ CAPILLARY BLOOD SPEC: CPT

## 2022-11-10 PROCEDURE — 90471 IMMUNIZATION ADMIN: CPT

## 2022-11-10 RX ORDER — METFORMIN HYDROCHLORIDE 500 MG/1
1000 TABLET, EXTENDED RELEASE ORAL 2 TIMES DAILY WITH MEALS
Qty: 120 TABLET | Refills: 0 | Status: SHIPPED | OUTPATIENT
Start: 2022-11-10 | End: 2022-12-07

## 2022-11-10 NOTE — PROGRESS NOTES
"    I N T E R N A L  M E D I C I N E  Letha Qureshi, EMELI    ENCOUNTER DATE:  11/10/2022    Emil Watts / 57 y.o. / male      CHIEF COMPLAINT / REASON FOR OFFICE VISIT     Diabetes      ASSESSMENT & PLAN     Diagnoses and all orders for this visit:    1. Type 2 diabetes mellitus with hyperglycemia, without long-term current use of insulin (HCC) (Primary)  Overview:  Continue metformin  mg qd.    Orders:  -     POC Glycosylated Hemoglobin (Hb A1C)  -     metFORMIN ER (GLUCOPHAGE-XR) 500 MG 24 hr tablet; Take 2 tablets by mouth 2 (Two) Times a Day With Meals for 90 days.  Dispense: 120 tablet; Refill: 0    2. Encounter for immunization  -     FluLaval/Fluzone >6 mos ([id number])       SUMMARY/DISCUSSION  • Today's A1C is now 8.2, decreased from 9.0, on September 26.   • Discussed with pt and his daughter that given insurance difficulties, and continued elevated A1C, best option may be to start insulin at this time.  Pt and daughter expressed that insulin is not an option they can consider due to cost.  • Agreeable to work towards increasing metformin 500 mg daily to 1000 mg BID over the next few weeks.  Provided with instructions on how titrate dose, and educated on side effects to watch for.  Pt to provide information to our office on where to send new Rx for Samiruvia.  • He is aware that FBS goal is < 130.  Continue dietary and exercise improvement.  • Aware of importance of following up as scheduled.        Next Appointment with me: Visit date not found    Return for Next scheduled follow up.      VITAL SIGNS     Visit Vitals  /88   Pulse 81   Temp 97.1 °F (36.2 °C)   Ht 182.9 cm (72.01\")   Wt 109 kg (240 lb)   SpO2 97%   BMI 32.54 kg/m²             Wt Readings from Last 3 Encounters:   11/10/22 109 kg (240 lb)   10/06/22 109 kg (241 lb)   09/26/22 107 kg (236 lb)     Body mass index is 32.54 kg/m².        MEDICATIONS AT THE TIME OF OFFICE VISIT     Current Outpatient Medications on File Prior to Visit "   Medication Sig Dispense Refill   • Ascorbic Acid (SHARIFA-C PO) Take  by mouth.     • atorvastatin (LIPITOR) 40 MG tablet Take 1 tablet by mouth Every Night. 90 tablet 0   • Blood Glucose Monitoring Suppl (ONE TOUCH ULTRA 2) w/Device kit Use as instructed. 1 each 5   • cholecalciferol (VITAMIN D3) 25 MCG (1000 UT) tablet Take 1,000 Units by mouth Daily.     • glucose blood test strip Test one time daily before breakfast 100 each 0   • hydroCHLOROthiazide (HYDRODIURIL) 12.5 MG tablet Take 1 tablet by mouth Daily. 90 tablet 0   • Lancet Devices (ACCU-CHEK SOFTCLIX) lancets Test one time daily 100 each 0   • olmesartan (BENICAR) 40 MG tablet Take 1 tablet by mouth Daily. 90 tablet 0   • Omega-3 Fatty Acids (FISH OIL) 1000 MG capsule capsule Take  by mouth Daily With Breakfast.     • [DISCONTINUED] metFORMIN ER (GLUCOPHAGE-XR) 500 MG 24 hr tablet Take 1 tablet by mouth Daily With Breakfast. 90 tablet 0   • SITagliptin (Januvia) 100 MG tablet Take 1 tablet by mouth Daily. 90 tablet 0   • [DISCONTINUED] Canagliflozin (Invokana) 100 MG tablet tablet Take 1 tablet by mouth Daily. 90 tablet 0     No current facility-administered medications on file prior to visit.        HISTORY OF PRESENT ILLNESS     Lithuanian  used during appointment.    Pt is accompanied by his daughter to today's appointment.    Type 2 Diabetes:  He remains only on Metformin 500 mg with breakfast due to ongoing insurance difficulties.  Pt's daughter reports she discussed financial concerns at length with their insurance provider, and they will apply for a special program in order to obtain 1 year Rx for Januvia.  She reports that she will provide our office with the information on where to send the Rx by this Friday.      Pt has implemented diet changes, and is exercising more.    Fasting blood sugars are averaging 150s-140s.  Post prandial sugars 180s-190s.        Patient Care Team:  Al Francisco MD as PCP - General (Internal Medicine)    REVIEW  OF SYSTEMS     Review of Systems   Constitutional: Negative for chills, fever and unexpected weight change.   Respiratory: Negative for cough, chest tightness and shortness of breath.    Cardiovascular: Negative for chest pain, palpitations and leg swelling.   Neurological: Negative for dizziness, weakness, light-headedness and headaches.   Psychiatric/Behavioral: The patient is not nervous/anxious.           PHYSICAL EXAMINATION     Physical Exam  Vitals reviewed.   Constitutional:       General: He is not in acute distress.     Appearance: Normal appearance. He is not ill-appearing, toxic-appearing or diaphoretic.   HENT:      Head: Normocephalic and atraumatic.   Cardiovascular:      Rate and Rhythm: Normal rate and regular rhythm.      Heart sounds: Normal heart sounds.   Pulmonary:      Effort: Pulmonary effort is normal.      Breath sounds: Normal breath sounds.   Neurological:      Mental Status: He is alert and oriented to person, place, and time. Mental status is at baseline.   Psychiatric:         Mood and Affect: Mood normal.         Behavior: Behavior normal.         Thought Content: Thought content normal.         Judgment: Judgment normal.           REVIEWED DATA     Labs:           Imaging:            Medical Tests:           Summary of old records / correspondence / consultant report:           Request outside records:

## 2022-12-07 DIAGNOSIS — E11.65 TYPE 2 DIABETES MELLITUS WITH HYPERGLYCEMIA, WITHOUT LONG-TERM CURRENT USE OF INSULIN: ICD-10-CM

## 2022-12-07 DIAGNOSIS — E11.8 TYPE 2 DIABETES MELLITUS WITH COMPLICATION, WITHOUT LONG-TERM CURRENT USE OF INSULIN: ICD-10-CM

## 2022-12-07 RX ORDER — METFORMIN HYDROCHLORIDE 500 MG/1
TABLET, EXTENDED RELEASE ORAL
Qty: 120 TABLET | Refills: 2 | Status: SHIPPED | OUTPATIENT
Start: 2022-12-07 | End: 2023-02-28

## 2022-12-07 RX ORDER — BLOOD SUGAR DIAGNOSTIC
STRIP MISCELLANEOUS
Qty: 100 EACH | Refills: 3 | Status: SHIPPED | OUTPATIENT
Start: 2022-12-07

## 2023-01-03 DIAGNOSIS — I10 ESSENTIAL HYPERTENSION: ICD-10-CM

## 2023-01-03 RX ORDER — HYDROCHLOROTHIAZIDE 12.5 MG/1
12.5 TABLET ORAL DAILY
Qty: 90 TABLET | Refills: 1 | Status: SHIPPED | OUTPATIENT
Start: 2023-01-03

## 2023-01-03 RX ORDER — OLMESARTAN MEDOXOMIL 40 MG/1
40 TABLET ORAL DAILY
Qty: 90 TABLET | Refills: 1 | Status: SHIPPED | OUTPATIENT
Start: 2023-01-03

## 2023-01-22 DIAGNOSIS — I10 ESSENTIAL HYPERTENSION: ICD-10-CM

## 2023-01-23 RX ORDER — OLMESARTAN MEDOXOMIL 40 MG/1
40 TABLET ORAL DAILY
Qty: 90 TABLET | Refills: 3 | OUTPATIENT
Start: 2023-01-23

## 2023-01-23 RX ORDER — HYDROCHLOROTHIAZIDE 12.5 MG/1
12.5 TABLET ORAL DAILY
Qty: 90 TABLET | Refills: 3 | OUTPATIENT
Start: 2023-01-23

## 2023-01-30 DIAGNOSIS — E78.5 HYPERLIPIDEMIA, UNSPECIFIED HYPERLIPIDEMIA TYPE: Chronic | ICD-10-CM

## 2023-01-30 RX ORDER — ATORVASTATIN CALCIUM 40 MG/1
40 TABLET, FILM COATED ORAL NIGHTLY
Qty: 90 TABLET | Refills: 3 | OUTPATIENT
Start: 2023-01-30

## 2023-02-24 ENCOUNTER — OFFICE VISIT (OUTPATIENT)
Dept: INTERNAL MEDICINE | Age: 58
End: 2023-02-24
Payer: COMMERCIAL

## 2023-02-24 VITALS
DIASTOLIC BLOOD PRESSURE: 84 MMHG | HEIGHT: 72 IN | OXYGEN SATURATION: 95 % | WEIGHT: 245.8 LBS | HEART RATE: 80 BPM | BODY MASS INDEX: 33.29 KG/M2 | SYSTOLIC BLOOD PRESSURE: 120 MMHG

## 2023-02-24 DIAGNOSIS — I10 PRIMARY HYPERTENSION: Chronic | ICD-10-CM

## 2023-02-24 DIAGNOSIS — E78.2 MIXED HYPERLIPIDEMIA: Chronic | ICD-10-CM

## 2023-02-24 DIAGNOSIS — F17.219 CIGARETTE NICOTINE DEPENDENCE WITH NICOTINE-INDUCED DISORDER: Chronic | ICD-10-CM

## 2023-02-24 DIAGNOSIS — E11.65 TYPE 2 DIABETES MELLITUS WITH HYPERGLYCEMIA, WITHOUT LONG-TERM CURRENT USE OF INSULIN: Primary | Chronic | ICD-10-CM

## 2023-02-24 LAB — HBA1C MFR BLD: 8.9 % (ref 4.8–5.6)

## 2023-02-24 PROCEDURE — 99214 OFFICE O/P EST MOD 30 MIN: CPT | Performed by: INTERNAL MEDICINE

## 2023-02-24 NOTE — PROGRESS NOTES
I N T E R N A L  M E D I C I N E    J U N O H  K I M,  M D      ENCOUNTER DATE:  02/24/2023    Emil Watts / 57 y.o. / male    CHIEF COMPLAINT / REASON FOR OFFICE VISIT     Hypertension and Diabetes      ASSESSMENT & PLAN     Problem List Items Addressed This Visit        High    HTN (hypertension) (Chronic)    Current Assessment & Plan     Continue hydrochlorothiazide 12.5 mg and olmesartan 40 mg daily.    BP Readings from Last 3 Encounters:   02/24/23 120/84   11/10/22 110/88   10/06/22 118/90             Relevant Medications    hydroCHLOROthiazide (HYDRODIURIL) 12.5 MG tablet    olmesartan (BENICAR) 40 MG tablet    DM type 2 (diabetes mellitus, type 2) (HCC) - Primary (Chronic)    Current Assessment & Plan     Due to cost of medication with his current insurance I provided him with 2-month sample of Tradjenta 5 mg every morning.  Continue metformin  mg 2 tablets twice daily.  Check A1c level today.     Lab Results   Component Value Date    HGBA1C 8.2 11/10/2022    HGBA1C 9.00 (H) 09/26/2022    HGBA1C 6.8 (H) 03/11/2022             Relevant Medications    Lancet Devices (ACCU-CHEK SOFTCLIX) lancets    Blood Glucose Monitoring Suppl (ONE TOUCH ULTRA 2) w/Device kit    metFORMIN ER (GLUCOPHAGE-XR) 500 MG 24 hr tablet    OneTouch Ultra test strip    Other Relevant Orders    Hemoglobin A1c       Medium    Hyperlipidemia (Chronic)    Overview     Continue atorvastatin 40 mg qd.          Relevant Medications    atorvastatin (LIPITOR) 40 MG tablet       Low    Cigarette nicotine dependence with nicotine-induced disorder (Chronic)    Overview     X 30 yrs         Current Assessment & Plan     Smoking 1/2 pack per day ; advised to decrease/quit smoking          Orders Placed This Encounter   Procedures   • Hemoglobin A1c     No orders of the defined types were placed in this encounter.      SUMMARY/DISCUSSION  •       Next Appointment with me: Visit date not found    Return in about 4 months (around 6/24/2023)  "for Diabetes, Hypertension, Hyperlipidemia.      VITAL SIGNS     Vitals:    02/24/23 0912   BP: 120/84   Pulse: 80   SpO2: 95%   Weight: 111 kg (245 lb 12.8 oz)   Height: 182.9 cm (72.01\")       BP Readings from Last 3 Encounters:   02/24/23 120/84   11/10/22 110/88   10/06/22 118/90     Wt Readings from Last 3 Encounters:   02/24/23 111 kg (245 lb 12.8 oz)   11/10/22 109 kg (240 lb)   10/06/22 109 kg (241 lb)     Body mass index is 33.33 kg/m².    Blood pressure readings recorded on patient flowsheet:  No flowsheet data found.       MEDICATIONS AT THE TIME OF OFFICE VISIT     Current Outpatient Medications on File Prior to Visit   Medication Sig   • Ascorbic Acid (SHARIFA-C PO) Take  by mouth.   • atorvastatin (LIPITOR) 40 MG tablet Take 1 tablet by mouth Every Night.   • Blood Glucose Monitoring Suppl (ONE TOUCH ULTRA 2) w/Device kit Use as instructed.   • cholecalciferol (VITAMIN D3) 25 MCG (1000 UT) tablet Take 1,000 Units by mouth Daily.   • hydroCHLOROthiazide (HYDRODIURIL) 12.5 MG tablet Take 1 tablet by mouth Daily.   • Lancet Devices (ACCU-CHEK SOFTCLIX) lancets Test one time daily   • metFORMIN ER (GLUCOPHAGE-XR) 500 MG 24 hr tablet TAKE 2 TABLETS BY MOUTH TWICE DAILY WITH MEALS   • olmesartan (BENICAR) 40 MG tablet Take 1 tablet by mouth Daily.   • Omega-3 Fatty Acids (FISH OIL) 1000 MG capsule capsule Take  by mouth Daily With Breakfast.   • OneTouch Ultra test strip TEST ONCE DAILY BEFORE BREAKFAST     No current facility-administered medications on file prior to visit.          HISTORY OF PRESENT ILLNESS     Due to change in his work situation and insurance he was not able to take Januvia due to cost constraints.  His metformin was previously increased to 500 mg 2 tablets twice daily.  He denies significant GI side effects.  Most recent A1c level was 8.2.  Hypertension remains stable on olmesartan 40 mg and hydrochlorothiazide 12.5 mg.  He takes atorvastatin 40 mg without significant side effects for " hyperlipidemia.  He denies any chest pains or dyspnea exertion.  Unfortunately he continues to smoke about 1/2 pack per day.      Patient Care Team:  Al Francisco MD as PCP - General (Internal Medicine)    REVIEW OF SYSTEMS     Review of Systems       PHYSICAL EXAMINATION     Physical Exam  General: No acute distress; obese   Psych: Normal thought and judgment   Cardiovascular Rate: normal. Rhythm: regular. Heart sounds: normal   Pulm/Chest: Effort normal, breath sounds normal.       REVIEWED DATA     Labs:     Lab Results   Component Value Date     09/26/2022    K 3.8 09/26/2022    CALCIUM 10.3 09/26/2022    AST 20 09/26/2022    ALT 36 09/26/2022    BUN 10 09/26/2022    CREATININE 0.69 (L) 09/26/2022    CREATININE 0.87 02/18/2022    CREATININE 0.91 11/05/2021    EGFRIFNONA 98 02/18/2022    EGFRIFAFRI 113 02/18/2022       Lab Results   Component Value Date    HGBA1C 8.2 11/10/2022    HGBA1C 9.00 (H) 09/26/2022    HGBA1C 6.8 (H) 03/11/2022       Lab Results   Component Value Date    LDL 92 09/26/2022    LDL 63 02/18/2022     (H) 11/05/2021    HDL 38 (L) 09/26/2022    HDL 49 02/18/2022    TRIG 787 (H) 09/26/2022    TRIG 181 (H) 02/18/2022       Lab Results   Component Value Date    TSH 2.940 01/19/2018    TSH 3.010 05/19/2017    TSH 5.630 (H) 01/20/2017    FREET4 1.24 01/19/2018    FREET4 1.19 05/19/2017    FREET4 1.27 01/20/2017       Lab Results   Component Value Date    WBC 5.68 09/26/2022    HGB 16.2 09/26/2022     09/26/2022       Lab Results   Component Value Date    MALBCRERATIO <3 02/05/2021          Imaging:           Medical Tests:           Summary of old records / correspondence / consultant report:           Request outside records:             *Examiner was wearing KN95 mask during the entire duration of the visit. Patient was masked the entire time. Minimum social distance of 6 ft maintained entire visit except if physical contact was necessary as documented.       Template created  by Cheryle Francisco MD

## 2023-02-24 NOTE — ASSESSMENT & PLAN NOTE
Due to cost of medication with his current insurance I provided him with 2-month sample of Tradjenta 5 mg every morning.  Continue metformin  mg 2 tablets twice daily.  Check A1c level today.     Lab Results   Component Value Date    HGBA1C 8.2 11/10/2022    HGBA1C 9.00 (H) 09/26/2022    HGBA1C 6.8 (H) 03/11/2022

## 2023-02-24 NOTE — ASSESSMENT & PLAN NOTE
Continue hydrochlorothiazide 12.5 mg and olmesartan 40 mg daily.    BP Readings from Last 3 Encounters:   02/24/23 120/84   11/10/22 110/88   10/06/22 118/90

## 2023-02-28 DIAGNOSIS — E11.65 TYPE 2 DIABETES MELLITUS WITH HYPERGLYCEMIA, WITHOUT LONG-TERM CURRENT USE OF INSULIN: ICD-10-CM

## 2023-02-28 RX ORDER — METFORMIN HYDROCHLORIDE 500 MG/1
TABLET, EXTENDED RELEASE ORAL
Qty: 120 TABLET | Refills: 3 | Status: SHIPPED | OUTPATIENT
Start: 2023-02-28

## 2023-04-16 DIAGNOSIS — E78.5 HYPERLIPIDEMIA, UNSPECIFIED HYPERLIPIDEMIA TYPE: Chronic | ICD-10-CM

## 2023-04-17 RX ORDER — ATORVASTATIN CALCIUM 40 MG/1
40 TABLET, FILM COATED ORAL NIGHTLY
Qty: 90 TABLET | Refills: 1 | Status: SHIPPED | OUTPATIENT
Start: 2023-04-17

## 2023-05-20 DIAGNOSIS — I10 ESSENTIAL HYPERTENSION: ICD-10-CM

## 2023-05-22 RX ORDER — HYDROCHLOROTHIAZIDE 12.5 MG/1
12.5 TABLET ORAL DAILY
Qty: 90 TABLET | Refills: 0 | Status: SHIPPED | OUTPATIENT
Start: 2023-05-22

## 2023-05-22 RX ORDER — OLMESARTAN MEDOXOMIL 40 MG/1
40 TABLET ORAL DAILY
Qty: 90 TABLET | Refills: 0 | Status: SHIPPED | OUTPATIENT
Start: 2023-05-22

## 2023-06-11 DIAGNOSIS — E78.5 HYPERLIPIDEMIA, UNSPECIFIED HYPERLIPIDEMIA TYPE: Chronic | ICD-10-CM

## 2023-06-12 RX ORDER — ATORVASTATIN CALCIUM 40 MG/1
40 TABLET, FILM COATED ORAL NIGHTLY
Qty: 90 TABLET | Refills: 0 | Status: SHIPPED | OUTPATIENT
Start: 2023-06-12

## 2023-06-20 NOTE — TELEPHONE ENCOUNTER
----- Message from Rhys Eunice sent at 6/19/2023  1:49 PM EDT -----  Subject: Medication Problem    Medication: Other - Zoloft  Dosage: once per day  Ordering Provider: lelia    Question/Problem: patient was moving this weekend and lost her new bottle   of medicine and needs another refill asap       Pharmacy: [facility] [address] [place] [phone] Cailin Loyola [phone]    ---------------------------------------------------------------------------  --------------  [address]  [phone]; OK to leave message on voicemail  ---------------------------------------------------------------------------  --------------    SCRIPT ANSWERS  Relationship to Patient: Self Informed result to daughter . Ok hipaa. Sent rx to be signed . Scheduled lisandra

## 2023-06-30 PROBLEM — Z86.0100 HISTORY OF COLON POLYPS: Status: RESOLVED | Noted: 2021-05-06 | Resolved: 2023-06-30

## 2023-06-30 PROBLEM — R63.4 WEIGHT LOSS: Status: RESOLVED | Noted: 2021-08-25 | Resolved: 2023-06-30

## 2023-06-30 PROBLEM — Z12.11 ENCOUNTER FOR SCREENING FOR MALIGNANT NEOPLASM OF COLON: Status: RESOLVED | Noted: 2021-05-06 | Resolved: 2023-06-30

## 2023-06-30 PROBLEM — Z86.0100 PERSONAL HISTORY OF COLONIC POLYPS: Status: RESOLVED | Noted: 2019-08-08 | Resolved: 2023-06-30

## 2023-06-30 PROBLEM — E66.09 CLASS 1 OBESITY DUE TO EXCESS CALORIES WITH BODY MASS INDEX (BMI) OF 32.0 TO 32.9 IN ADULT: Chronic | Status: ACTIVE | Noted: 2017-01-16

## 2023-06-30 PROBLEM — S62.609A FINGER FRACTURE: Status: RESOLVED | Noted: 2021-04-22 | Resolved: 2023-06-30

## 2023-06-30 PROBLEM — Z86.010 HISTORY OF COLON POLYPS: Status: RESOLVED | Noted: 2021-05-06 | Resolved: 2023-06-30

## 2023-06-30 PROBLEM — E66.811 CLASS 1 OBESITY DUE TO EXCESS CALORIES WITH BODY MASS INDEX (BMI) OF 32.0 TO 32.9 IN ADULT: Chronic | Status: ACTIVE | Noted: 2017-01-16

## 2023-06-30 PROBLEM — Z86.010 PERSONAL HISTORY OF COLONIC POLYPS: Status: RESOLVED | Noted: 2019-08-08 | Resolved: 2023-06-30

## 2023-08-16 ENCOUNTER — TELEPHONE (OUTPATIENT)
Dept: INTERNAL MEDICINE | Age: 58
End: 2023-08-16

## 2023-08-16 NOTE — TELEPHONE ENCOUNTER
Caller: David Watts    Relationship to patient: Emergency Contact    Best call back number: [phone]     Patient is needing: INSURANCE CALLED WITH PATIENT'S DAUGHTER ON LINE. CHARLES REQUIRES PRIOR AUTHORIZATION. CAN BE DONE VERBALLY BY CALLING [phone]

## 2023-08-17 DIAGNOSIS — I10 ESSENTIAL HYPERTENSION: ICD-10-CM

## 2023-08-18 RX ORDER — OLMESARTAN MEDOXOMIL 40 MG/1
40 TABLET ORAL DAILY
Qty: 90 TABLET | Refills: 0 | Status: SHIPPED | OUTPATIENT
Start: 2023-08-18

## 2023-09-14 DIAGNOSIS — E78.5 HYPERLIPIDEMIA, UNSPECIFIED HYPERLIPIDEMIA TYPE: Chronic | ICD-10-CM

## 2023-09-14 RX ORDER — ATORVASTATIN CALCIUM 40 MG/1
40 TABLET, FILM COATED ORAL NIGHTLY
Qty: 90 TABLET | Refills: 0 | Status: SHIPPED | OUTPATIENT
Start: 2023-09-14

## 2023-10-10 ENCOUNTER — TELEPHONE (OUTPATIENT)
Dept: INTERNAL MEDICINE | Age: 58
End: 2023-10-10
Payer: COMMERCIAL

## 2023-10-10 DIAGNOSIS — I10 ESSENTIAL HYPERTENSION: ICD-10-CM

## 2023-10-10 DIAGNOSIS — E78.5 HYPERLIPIDEMIA, UNSPECIFIED HYPERLIPIDEMIA TYPE: Chronic | ICD-10-CM

## 2023-10-10 DIAGNOSIS — E11.65 TYPE 2 DIABETES MELLITUS WITH HYPERGLYCEMIA, WITHOUT LONG-TERM CURRENT USE OF INSULIN: ICD-10-CM

## 2023-10-10 RX ORDER — METFORMIN HYDROCHLORIDE 500 MG/1
1000 TABLET, EXTENDED RELEASE ORAL 2 TIMES DAILY WITH MEALS
Qty: 360 TABLET | Refills: 1 | Status: CANCELLED | OUTPATIENT
Start: 2023-10-10

## 2023-10-10 RX ORDER — HYDROCHLOROTHIAZIDE 12.5 MG/1
12.5 TABLET ORAL DAILY
Qty: 90 TABLET | Refills: 1 | Status: CANCELLED | OUTPATIENT
Start: 2023-10-10

## 2023-10-10 RX ORDER — ATORVASTATIN CALCIUM 40 MG/1
40 TABLET, FILM COATED ORAL NIGHTLY
Qty: 90 TABLET | Refills: 1 | Status: CANCELLED | OUTPATIENT
Start: 2023-10-10

## 2023-10-10 RX ORDER — OLMESARTAN MEDOXOMIL 40 MG/1
40 TABLET ORAL DAILY
Qty: 90 TABLET | Refills: 1 | Status: CANCELLED | OUTPATIENT
Start: 2023-10-10

## 2023-10-10 NOTE — TELEPHONE ENCOUNTER
----- Message from Emil Watts sent at 10/10/2023 12:36 PM EDT -----  Regarding: About blood reading  Contact: [phone]  Metformin 500mg (2tablets - twice a day)  Atorvastatin 40mg (1 tablet)  Olmesartan 40mg (1 tablet)  Hydrocholorothiazide 12.5mg (1 tablet)    These are all I am taking currently.

## 2023-10-12 DIAGNOSIS — E11.65 TYPE 2 DIABETES MELLITUS WITH HYPERGLYCEMIA, WITHOUT LONG-TERM CURRENT USE OF INSULIN: Primary | ICD-10-CM

## 2023-10-12 RX ORDER — PIOGLITAZONEHYDROCHLORIDE 15 MG/1
15 TABLET ORAL
Qty: 30 TABLET | Refills: 3 | Status: SHIPPED | OUTPATIENT
Start: 2023-10-12

## 2023-10-12 NOTE — TELEPHONE ENCOUNTER
----- Message from Emil Watts sent at 10/12/2023  9:07 AM EDT -----  Regarding: About blood reading  Contact: [phone]  Yes that works  Thank you

## 2023-10-24 ENCOUNTER — OFFICE VISIT (OUTPATIENT)
Dept: INTERNAL MEDICINE | Age: 58
End: 2023-10-24
Payer: COMMERCIAL

## 2023-10-24 VITALS
BODY MASS INDEX: 30.48 KG/M2 | SYSTOLIC BLOOD PRESSURE: 110 MMHG | WEIGHT: 225 LBS | DIASTOLIC BLOOD PRESSURE: 84 MMHG | HEART RATE: 84 BPM | HEIGHT: 72 IN | OXYGEN SATURATION: 96 % | TEMPERATURE: 97.1 F

## 2023-10-24 DIAGNOSIS — E11.65 TYPE 2 DIABETES MELLITUS WITH HYPERGLYCEMIA, WITHOUT LONG-TERM CURRENT USE OF INSULIN: Primary | Chronic | ICD-10-CM

## 2023-10-24 DIAGNOSIS — E78.2 MIXED HYPERLIPIDEMIA: Chronic | ICD-10-CM

## 2023-10-24 DIAGNOSIS — I10 PRIMARY HYPERTENSION: Chronic | ICD-10-CM

## 2023-10-24 RX ORDER — GLIMEPIRIDE 2 MG/1
2 TABLET ORAL
Qty: 30 TABLET | Refills: 3 | Status: SHIPPED | OUTPATIENT
Start: 2023-10-24

## 2023-10-24 NOTE — LETTER
[facility]  Vaccine Consent Form    Patient Name:  Emil Watts  Patient :  1965     Vaccine(s) Ordered    Fluzone (or Fluarix & Flulaval for VFC) >6 Mos ([id number])        Screening Checklist  The following questions should be completed prior to vaccination. If you answer “yes” to any question, it does not necessarily mean you should not be vaccinated. It just means we may need to clarify or ask more questions. If a question is unclear, please ask your healthcare provider to explain it.    Yes No   Any fever or moderate to severe illness today (mild illness and/or antibiotic treatment are not contraindications)?     Do you have a history of a serious reaction to any previous vaccinations, such as anaphylaxis, encephalopathy within 7 days, Guillain-[place] syndrome within 6 weeks, seizure?     Have you received any live vaccine(s) in the past month (MMR, MICHELLE)?     Do you have an anaphylactic allergy to latex (DTaP, DTaP-IPV, Hep A, Hep B, MenB, RV, Td, Tdap), baker’s yeast (Hep B, HPV), or gelatin (MICHELLE, MMR)?     Do you have an anaphylactic allergy to neomycin (Rabies, MICHELLE, MMR, IPV, Hep A), polymyxin B (IPV), or streptomycin (IPV)?      Any cancer, leukemia, AIDS, or other immune system disorder? (MICHELLE, MMR, RV)     Do you have a parent, brother, or sister with an immune system problem (if immune competence of vaccine recipient clinically verified, can proceed)? (MMR, MICHELLE)     Any recent steroid treatments for >2 weeks, chemotherapy, or radiation treatment? (MICHELLE, MMR)     Have you received antibody-containing blood transfusions or IVIG in the past 11 months (recommended interval is dependent on product)? (MMR, MICHELLE)     Have you taken antiviral drugs (acyclovir, famciclovir, valacyclovir) in the last 24 or 48 hours, respectively (MICHELLE)?      Are you pregnant or planning to become pregnant within 1 month? (MICHELLE, MMR, HPV, IPV, MenB; For hep B- refer to Engerix-B)     For infants, have you ever been told your  child has had intussusception or a medical emergency involving obstruction of the intestine (RV)? If not for an infant, can skip this question.         *Ordering Physician/APC should be consulted if “yes” is checked by the patient or guardian above.      I have received, read, and understand the Vaccine Information Statement (VIS) for each vaccine ordered above.  I have considered my health status as well as the health status of my close contacts.  I have taken the opportunity to discuss my vaccine questions with my health care provider.   I have requested that the ordered vaccine(s) be given to me.  I understand the benefits and risks of the vaccines.  I understand that I should remain in the clinic for 15 minutes after receiving the vaccine(s).  _________________________________________________________  Signature of Patient or Parent/Legal Guardian ____________________  Date

## 2023-10-24 NOTE — PROGRESS NOTES
I N T E R N A L  M E D I C I N E    J U N O H  K I M,  M D      ENCOUNTER DATE:  10/24/2023    Emil Watts / 58 y.o. / male    CHIEF COMPLAINT / REASON FOR OFFICE VISIT     Diabetes      ASSESSMENT & PLAN     Problem List Items Addressed This Visit          High    Type 2 diabetes mellitus with hyperglycemia, without long-term current use of insulin - Primary (Chronic)    Current Assessment & Plan     Lab Results   Component Value Date    HGBA1C 13.50 (H) 10/17/2023    HGBA1C 11.20 (H) 06/30/2023    HGBA1C 8.90 (H) 02/24/2023    CREATININE 0.83 10/17/2023    LDL 54 10/17/2023    MALBCRERATIO 12 06/30/2023      A1c is significantly higher at 13.50 due to poor dietary and medication compliance. His insurance previously denied coverage for Farxiga and Januvia but is unclear why. He has not started pioglitazone 15 mg previously recommended.     Start glimepiride 2 mg qAM   Advised to take metformin  mg two BID (missing evening doses)  Needs significant improvement in dietary compliance. Reports to eating noodles regularly. May need further diabetic dietary education.  Return to APRN in 2 weeks with glucose readings. If glucose is not significantly improving, will need to start basal insulin.   His insurance previously denied approval for Farxiga and Januvia but we may need to look into this further on follow-up.          Relevant Medications    Lancet Devices (ACCU-CHEK SOFTCLIX) lancets    Blood Glucose Monitoring Suppl (ONE TOUCH ULTRA 2) w/Device kit    OneTouch Ultra test strip    metFORMIN ER (GLUCOPHAGE-XR) 500 MG 24 hr tablet    glimepiride (AMARYL) 2 MG tablet    Other Relevant Orders    Urinalysis With Microscopic If Indicated (No Culture) - Urine, Clean Catch    C-Peptide       Medium    HTN (hypertension) (Chronic)    Relevant Medications    hydroCHLOROthiazide (HYDRODIURIL) 12.5 MG tablet    olmesartan (BENICAR) 40 MG tablet    Hyperlipidemia (Chronic)    Overview     Continue atorvastatin 40 mg  "qd.          Relevant Medications    atorvastatin (LIPITOR) 40 MG tablet     Orders Placed This Encounter   Procedures    Fluzone (or Fluarix & Flulaval for VFC) >6 Mos ([id number])    Urinalysis With Microscopic If Indicated (No Culture) - Urine, Clean Catch    C-Peptide     New Medications Ordered This Visit   Medications    glimepiride (AMARYL) 2 MG tablet     Sig: Take 1 tablet by mouth Every Morning Before Breakfast.     Dispense:  30 tablet     Refill:  3       SUMMARY/DISCUSSION        Next Appointment with me: Visit date not found    Return in about 2 weeks (around 11/7/2023) for Diabetes assessment and possible insulin education with APRN .        VITAL SIGNS     Vitals:    10/24/23 1334   BP: 110/84   Pulse: 84   Temp: 97.1 °F (36.2 °C)   SpO2: 96%   Weight: 102 kg (225 lb)   Height: 182.9 cm (72.01\")       BP Readings from Last 3 Encounters:   10/24/23 110/84   06/30/23 110/88   02/24/23 120/84     Wt Readings from Last 3 Encounters:   10/24/23 102 kg (225 lb)   06/30/23 108 kg (239 lb)   02/24/23 111 kg (245 lb 12.8 oz)     Body mass index is 30.51 kg/m².    Blood pressure readings recorded on patient flowsheet:       No data to display                MEDICATIONS AT THE TIME OF OFFICE VISIT     Current Outpatient Medications on File Prior to Visit   Medication Sig    atorvastatin (LIPITOR) 40 MG tablet TAKE 1 TABLET BY MOUTH EVERY NIGHT    Blood Glucose Monitoring Suppl (ONE TOUCH ULTRA 2) w/Device kit Use as instructed.    cholecalciferol (VITAMIN D3) 25 MCG (1000 UT) tablet Take 1 tablet by mouth Daily.    hydroCHLOROthiazide (HYDRODIURIL) 12.5 MG tablet Take 1 tablet by mouth Daily.    Lancet Devices (ACCU-CHEK SOFTCLIX) lancets Test one time daily    metFORMIN ER (GLUCOPHAGE-XR) 500 MG 24 hr tablet TAKE 2 TABLETS BY MOUTH TWICE DAILY WITH MEALS    olmesartan (BENICAR) 40 MG tablet TAKE 1 TABLET BY MOUTH DAILY    OneTouch Ultra test strip TEST ONCE DAILY BEFORE BREAKFAST    [DISCONTINUED] Ascorbic Acid " (SHARIFA-C PO) Take  by mouth.    [DISCONTINUED] pioglitazone (Actos) 15 MG tablet Take 1 tablet by mouth Every Morning Before Breakfast.    [DISCONTINUED] dapagliflozin Propanediol (Farxiga) 10 MG tablet Take 10 mg by mouth Daily With Breakfast.    [DISCONTINUED] Omega-3 Fatty Acids (FISH OIL) 1000 MG capsule capsule Take  by mouth Daily With Breakfast. (Patient not taking: Reported on 10/24/2023)    [DISCONTINUED] SITagliptin (Januvia) 100 MG tablet Take 1 tablet by mouth Daily. (Patient not taking: Reported on 10/24/2023)     No current facility-administered medications on file prior to visit.         HISTORY OF PRESENT ILLNESS     A1c is now 13.5. insurance denied coverage for Farxiga and Januvia.  He is on metformin  mg.  He has not been compliant with the evening dose of metformin on a consistent basis.  He has not been compliant with his diet and reports to eating noodles on regular basis.  He does not monitor his glucose at home.  He does report to starting an exercise program and reports some weight loss.  His blood pressure and cholesterol however have been stable on medication.    Lab Results   Component Value Date    HGBA1C 13.50 (H) 10/17/2023    HGBA1C 11.20 (H) 06/30/2023    HGBA1C 8.90 (H) 02/24/2023    CREATININE 0.83 10/17/2023    LDL 54 10/17/2023    MALBCRERATIO 12 06/30/2023     Blood sugar readings recorded on patient's flowsheet:       No data to display               102 kg (225 lb)    REVIEW OF SYSTEMS     Denies recent change in vision  Denies significant change in urination  Denies symptoms of hypoglycemia on regular basis  Denies chest pain or dyspnea on exertion      PHYSICAL EXAMINATION     Physical Exam  General: No acute distress  Psych: Normal thought and judgment   Cardiovascular Rate: normal. Rhythm: regular. Heart sounds: normal   Pulm/Chest: Effort normal, breath sounds normal.       REVIEWED DATA     Labs:       Lab Results   Component Value Date     10/17/2023    K 4.3  10/17/2023    CALCIUM 10.3 10/17/2023    AST 19 10/17/2023    ALT 24 10/17/2023    BUN 15 10/17/2023    CREATININE 0.83 10/17/2023    CREATININE 0.77 06/30/2023    CREATININE 0.69 (L) 09/26/2022    EGFRIFNONA 98 02/18/2022    EGFRIFAFRI 113 02/18/2022       Lab Results   Component Value Date    HGBA1C 13.50 (H) 10/17/2023    HGBA1C 11.20 (H) 06/30/2023    HGBA1C 8.90 (H) 02/24/2023       Lab Results   Component Value Date    LDL 54 10/17/2023    LDL 58 06/30/2023    LDL 92 09/26/2022    HDL 41 10/17/2023    HDL 39 (L) 06/30/2023    TRIG 323 (H) 10/17/2023    TRIG 367 (H) 06/30/2023       Lab Results   Component Value Date    TSH 2.940 01/19/2018    TSH 3.010 05/19/2017    TSH 5.630 (H) 01/20/2017    FREET4 1.24 01/19/2018    FREET4 1.19 05/19/2017    FREET4 1.27 01/20/2017       Lab Results   Component Value Date    WBC 5.68 09/26/2022    HGB 16.2 09/26/2022     09/26/2022     Lab Results   Component Value Date    PROTEIN Comment 01/20/2017    GLUCOSEU Comment 01/20/2017    BLOODU Comment 01/20/2017    NITRITEU Comment 01/20/2017    LEUKOCYTESUR Comment 01/20/2017      Lab Results   Component Value Date    MALBCRERATIO 12 06/30/2023            Imaging:           Medical Tests:           Summary of old records / correspondence / consultant report:           Request outside records:

## 2023-10-24 NOTE — ASSESSMENT & PLAN NOTE
Lab Results   Component Value Date    HGBA1C 13.50 (H) 10/17/2023    HGBA1C 11.20 (H) 06/30/2023    HGBA1C 8.90 (H) 02/24/2023    CREATININE 0.83 10/17/2023    LDL 54 10/17/2023    MALBCRERATIO 12 06/30/2023      A1c is significantly higher at 13.50 due to poor dietary and medication compliance. His insurance previously denied coverage for Farxiga and Januvia but is unclear why. He has not started pioglitazone 15 mg previously recommended.     Start glimepiride 2 mg qAM   Advised to take metformin  mg two BID (missing evening doses)  Needs significant improvement in dietary compliance. Reports to eating noodles regularly. May need further diabetic dietary education.  Return to APRN in 2 weeks with glucose readings. If glucose is not significantly improving, will need to start basal insulin.   His insurance previously denied approval for Farxiga and Veriduvia but we may need to look into this further on follow-up.

## 2023-10-25 LAB
APPEARANCE UR: CLEAR
BILIRUB UR QL STRIP: NEGATIVE
C PEPTIDE SERPL-MCNC: 3.3 NG/ML (ref 1.1–4.4)
COLOR UR: YELLOW
GLUCOSE UR QL STRIP: ABNORMAL
HGB UR QL STRIP: NEGATIVE
KETONES UR QL STRIP: ABNORMAL
LEUKOCYTE ESTERASE UR QL STRIP: NEGATIVE
NITRITE UR QL STRIP: NEGATIVE
PH UR STRIP: 6 [PH] (ref 5–8)
PROT UR QL STRIP: ABNORMAL
SP GR UR STRIP: ABNORMAL (ref 1–1.03)
UROBILINOGEN UR STRIP-MCNC: ABNORMAL MG/DL

## 2023-10-26 NOTE — PROGRESS NOTES
CALL PATIENT (talk to his daughter) with results:    1+ protein and >1000 glucose in urine indicating kidney problem related to poorly controlled diabetes and hypertension. Very important to maintain good diabetic control to prevent worsening. Start new medication as advised and return in 2 weeks as instructed.     C-peptide is within normal range indicating he still has sufficient insulin production.

## 2023-11-05 DIAGNOSIS — I10 ESSENTIAL HYPERTENSION: ICD-10-CM

## 2023-11-05 DIAGNOSIS — E11.65 TYPE 2 DIABETES MELLITUS WITH HYPERGLYCEMIA, WITHOUT LONG-TERM CURRENT USE OF INSULIN: ICD-10-CM

## 2023-11-06 RX ORDER — OLMESARTAN MEDOXOMIL 40 MG/1
40 TABLET ORAL DAILY
Qty: 90 TABLET | Refills: 1 | Status: SHIPPED | OUTPATIENT
Start: 2023-11-06

## 2023-11-06 RX ORDER — HYDROCHLOROTHIAZIDE 12.5 MG/1
12.5 TABLET ORAL DAILY
Qty: 90 TABLET | Refills: 1 | Status: SHIPPED | OUTPATIENT
Start: 2023-11-06

## 2023-11-06 RX ORDER — METFORMIN HYDROCHLORIDE 500 MG/1
1000 TABLET, EXTENDED RELEASE ORAL 2 TIMES DAILY WITH MEALS
Qty: 120 TABLET | Refills: 5 | Status: SHIPPED | OUTPATIENT
Start: 2023-11-06

## 2023-11-14 DIAGNOSIS — E11.65 TYPE 2 DIABETES MELLITUS WITH HYPERGLYCEMIA, WITHOUT LONG-TERM CURRENT USE OF INSULIN: Primary | ICD-10-CM

## 2023-11-15 DIAGNOSIS — E11.65 TYPE 2 DIABETES MELLITUS WITH HYPERGLYCEMIA, WITHOUT LONG-TERM CURRENT USE OF INSULIN: ICD-10-CM

## 2023-11-16 ENCOUNTER — OFFICE VISIT (OUTPATIENT)
Dept: INTERNAL MEDICINE | Age: 58
End: 2023-11-16
Payer: COMMERCIAL

## 2023-11-16 VITALS
WEIGHT: 232 LBS | DIASTOLIC BLOOD PRESSURE: 82 MMHG | TEMPERATURE: 97.6 F | BODY MASS INDEX: 31.42 KG/M2 | HEIGHT: 72 IN | HEART RATE: 69 BPM | OXYGEN SATURATION: 93 % | SYSTOLIC BLOOD PRESSURE: 134 MMHG

## 2023-11-16 DIAGNOSIS — E11.65 TYPE 2 DIABETES MELLITUS WITH HYPERGLYCEMIA, WITHOUT LONG-TERM CURRENT USE OF INSULIN: Primary | Chronic | ICD-10-CM

## 2023-11-16 DIAGNOSIS — E78.2 MIXED HYPERLIPIDEMIA: Chronic | ICD-10-CM

## 2023-11-16 PROCEDURE — 99214 OFFICE O/P EST MOD 30 MIN: CPT

## 2023-11-16 NOTE — PROGRESS NOTES
"    I N T E R N A L  M E D I C I N E  Letha Qureshi, EMELI    ENCOUNTER DATE:  11/16/2023    Emil Watts / 58 y.o. / male      CHIEF COMPLAINT / REASON FOR OFFICE VISIT     Diabetes      ASSESSMENT & PLAN     Diagnoses and all orders for this visit:    1. Type 2 diabetes mellitus with hyperglycemia, without long-term current use of insulin (Primary)  Overview:  Continue glimepiride 2 mg daily, Metformin 1000 mg ER BID.       2. Mixed hyperlipidemia  Overview:  Continue atorvastatin 40 mg qd.            SUMMARY/DISCUSSION  Patient's fasting glucoses have rapidly removed over the last two weeks, now approaching goal of between .  He remains without episodes of hypoglycemia.  Commended patient on his progress.  He is very attentive to improved dietary habits.  Reinforced importance of diet and exercise for improved glucose control and recommendation to ensure A1C is < 7.0 to reduce risk of heart attack/ stroke.  Will continue same medications at this time.  We will investigate into why he has been unable to obtain Farxiga from pharmacy, as I believe he would benefit from SGLT2 cardioprotective benefit.  He will follow up closely with PCP in 1 month to review continued fasting glucoses, update A1C.      Next Appointment with me: Visit date not found    Return in about 1 month (around 12/16/2023) for Recheck Type 2 diabetes.      VITAL SIGNS     Visit Vitals  /82   Pulse 69   Temp 97.6 °F (36.4 °C)   Ht 182.9 cm (72.01\")   Wt 105 kg (232 lb)   SpO2 93%   BMI 31.46 kg/m²             Wt Readings from Last 3 Encounters:   11/16/23 105 kg (232 lb)   10/24/23 102 kg (225 lb)   06/30/23 108 kg (239 lb)     Body mass index is 31.46 kg/m².        MEDICATIONS AT THE TIME OF OFFICE VISIT     Current Outpatient Medications on File Prior to Visit   Medication Sig Dispense Refill    atorvastatin (LIPITOR) 40 MG tablet TAKE 1 TABLET BY MOUTH EVERY NIGHT 90 tablet 0    Blood Glucose Monitoring Suppl (ONE TOUCH ULTRA 2) " w/Device kit Use as instructed. 1 each 5    cholecalciferol (VITAMIN D3) 25 MCG (1000 UT) tablet Take 1 tablet by mouth Daily.      glimepiride (AMARYL) 2 MG tablet Take 1 tablet by mouth Every Morning Before Breakfast. 30 tablet 3    glucose blood test strip Use to check FBS once daily . Dm2/ E11.9 100 each 3    hydroCHLOROthiazide (HYDRODIURIL) 12.5 MG tablet Take 1 tablet by mouth Daily. 90 tablet 1    Lancet Devices (ACCU-CHEK SOFTCLIX) lancets Test one time daily 100 each 0    metFORMIN ER (GLUCOPHAGE-XR) 500 MG 24 hr tablet Take 2 tablets by mouth 2 (Two) Times a Day With Meals. 120 tablet 5    olmesartan (BENICAR) 40 MG tablet Take 1 tablet by mouth Daily. 90 tablet 1     No current facility-administered medications on file prior to visit.        HISTORY OF PRESENT ILLNESS     Accompanied by his wife and daughter to today's appointment.  Daughter assists with Greenlandic translation, per patient's request.    Type 2 Diabetes: October 2023 Hemoglobin A1C 13.5.  Urinalysis with trace protein, glucose > 1000, ketones +1.  He feels completely well at today's visit, no polydipsia, polyphagia, polyuria.  He has been working towards improved dietary habits, including reduced noodles/ rice, and increased vegetables.  Now taking glimepiride 2 mg every morning and regularly taking Metformin ER 1000 mg BID.  He is monitoring fasting glucoses and they are steadily improving over the last two weeks, decreasing from 199 on November 1, 2023 to the last two mornings with glucose readings of 135 and 143.  Denies any episodes of hypoglycemia.  Farxiga and Januvia were previously prescribed to patient but he was unable to  from pharmacy for unclear reasons.      HLD:  Remains on atorvastatin 40 mg daily.  October 2023 lipid panel with elevated triglycerides 323, normal LDL 54.        Patient Care Team:  Al Francisco MD as PCP - General (Internal Medicine)    REVIEW OF SYSTEMS     Review of Systems   Constitutional:  Negative  for chills, fever and unexpected weight change.   Respiratory:  Negative for cough, chest tightness and shortness of breath.    Cardiovascular:  Negative for chest pain, palpitations and leg swelling.   Endocrine: Negative for polydipsia, polyphagia and polyuria.   Neurological:  Negative for dizziness, weakness, light-headedness and headaches.   Psychiatric/Behavioral:  The patient is not nervous/anxious.           PHYSICAL EXAMINATION     Physical Exam  Vitals reviewed.   Constitutional:       General: He is not in acute distress.     Appearance: Normal appearance. He is not ill-appearing, toxic-appearing or diaphoretic.   HENT:      Head: Normocephalic and atraumatic.   Cardiovascular:      Rate and Rhythm: Normal rate and regular rhythm.      Heart sounds: Normal heart sounds.   Pulmonary:      Effort: Pulmonary effort is normal.      Breath sounds: Normal breath sounds.   Musculoskeletal:      Right lower leg: No edema.      Left lower leg: No edema.   Neurological:      Mental Status: He is alert and oriented to person, place, and time. Mental status is at baseline.   Psychiatric:         Mood and Affect: Mood normal.         Behavior: Behavior normal.         Thought Content: Thought content normal.         Judgment: Judgment normal.           REVIEWED DATA     Labs:           Imaging:            Medical Tests:           Summary of old records / correspondence / consultant report:           Request outside records:

## 2023-12-12 ENCOUNTER — OFFICE VISIT (OUTPATIENT)
Dept: INTERNAL MEDICINE | Age: 58
End: 2023-12-12
Payer: COMMERCIAL

## 2023-12-12 VITALS
OXYGEN SATURATION: 97 % | BODY MASS INDEX: 31.97 KG/M2 | WEIGHT: 236 LBS | SYSTOLIC BLOOD PRESSURE: 120 MMHG | DIASTOLIC BLOOD PRESSURE: 90 MMHG | HEIGHT: 72 IN | HEART RATE: 74 BPM | TEMPERATURE: 97.1 F

## 2023-12-12 DIAGNOSIS — E78.2 MIXED HYPERLIPIDEMIA: Chronic | ICD-10-CM

## 2023-12-12 DIAGNOSIS — E11.65 TYPE 2 DIABETES MELLITUS WITH HYPERGLYCEMIA, WITHOUT LONG-TERM CURRENT USE OF INSULIN: Primary | Chronic | ICD-10-CM

## 2023-12-12 DIAGNOSIS — I10 PRIMARY HYPERTENSION: Chronic | ICD-10-CM

## 2023-12-12 LAB — HBA1C MFR BLD: 8.9 % (ref 4.8–5.6)

## 2023-12-12 PROCEDURE — 99214 OFFICE O/P EST MOD 30 MIN: CPT | Performed by: INTERNAL MEDICINE

## 2023-12-12 RX ORDER — GLIMEPIRIDE 4 MG/1
4 TABLET ORAL
Qty: 30 TABLET | Refills: 3 | Status: SHIPPED | OUTPATIENT
Start: 2023-12-12

## 2023-12-12 NOTE — PROGRESS NOTES
I N T E R N A L  M E D I C I N E    J U N O H  K I M,  M D      ENCOUNTER DATE:  12/12/2023    Emil Watts / 58 y.o. / male    CHIEF COMPLAINT / REASON FOR OFFICE VISIT     Diabetes      ASSESSMENT & PLAN     Problem List Items Addressed This Visit          High    Type 2 diabetes mellitus with hyperglycemia, without long-term current use of insulin - Primary (Chronic)    Current Assessment & Plan     Check A1c today.  Increase glimepiride to 4 mg qAM.   Maintain a low sugar/starch/carbohydrate diet.   Advised son to have him mother come with him next time.  Pending A1c will likely need to start basal insulin.  Discussed CGM.     Lab Results   Component Value Date    HGBA1C 13.50 (H) 10/17/2023    HGBA1C 11.20 (H) 06/30/2023    HGBA1C 8.90 (H) 02/24/2023    CREATININE 0.83 10/17/2023    LDL 54 10/17/2023    MALBCRERATIO 12 06/30/2023             Relevant Medications    Lancet Devices (ACCU-CHEK SOFTCLIX) lancets    Blood Glucose Monitoring Suppl (ONE TOUCH ULTRA 2) w/Device kit    metFORMIN ER (GLUCOPHAGE-XR) 500 MG 24 hr tablet    glucose blood test strip    glimepiride (AMARYL) 4 MG tablet    Other Relevant Orders    Hemoglobin A1c       Medium    HTN (hypertension) (Chronic)    Relevant Medications    hydroCHLOROthiazide (HYDRODIURIL) 12.5 MG tablet    olmesartan (BENICAR) 40 MG tablet    Hyperlipidemia (Chronic)    Overview     Continue atorvastatin 40 mg qd.          Relevant Medications    atorvastatin (LIPITOR) 40 MG tablet     Orders Placed This Encounter   Procedures    Hemoglobin A1c     New Medications Ordered This Visit   Medications    glimepiride (AMARYL) 4 MG tablet     Sig: Take 1 tablet by mouth Every Morning Before Breakfast.     Dispense:  30 tablet     Refill:  3       SUMMARY/DISCUSSION        Next Appointment with me: Visit date not found    Return in about 2 months (around 2/12/2024) for Diabetes.        VITAL SIGNS     Vitals:    12/12/23 1523   BP: 120/90   Pulse: 74   Temp: 97.1 °F  "(36.2 °C)   SpO2: 97%   Weight: 107 kg (236 lb)   Height: 182.9 cm (72.01\")       BP Readings from Last 3 Encounters:   12/12/23 120/90   11/16/23 134/82   10/24/23 110/84     Wt Readings from Last 3 Encounters:   12/12/23 107 kg (236 lb)   11/16/23 105 kg (232 lb)   10/24/23 102 kg (225 lb)     Body mass index is 32 kg/m².    Blood pressure readings recorded on patient flowsheet:       No data to display                MEDICATIONS AT THE TIME OF OFFICE VISIT     Current Outpatient Medications on File Prior to Visit   Medication Sig    atorvastatin (LIPITOR) 40 MG tablet TAKE 1 TABLET BY MOUTH EVERY NIGHT    Blood Glucose Monitoring Suppl (ONE TOUCH ULTRA 2) w/Device kit Use as instructed.    cholecalciferol (VITAMIN D3) 25 MCG (1000 UT) tablet Take 1 tablet by mouth Daily.    glucose blood test strip Use to check FBS once daily . Dm2/ E11.9    hydroCHLOROthiazide (HYDRODIURIL) 12.5 MG tablet Take 1 tablet by mouth Daily.    Lancet Devices (ACCU-CHEK SOFTCLIX) lancets Test one time daily    metFORMIN ER (GLUCOPHAGE-XR) 500 MG 24 hr tablet Take 2 tablets by mouth 2 (Two) Times a Day With Meals.    olmesartan (BENICAR) 40 MG tablet Take 1 tablet by mouth Daily.    [DISCONTINUED] glimepiride (AMARYL) 2 MG tablet Take 1 tablet by mouth Every Morning Before Breakfast.     No current facility-administered medications on file prior to visit.         HISTORY OF PRESENT ILLNESS     Presents with his son today.     Last A1c was 13.50. Does not watch his diet and consumes rice and noodles but also eats lot of [place] snacks.  FBS is generally < 140's and denies hypoglycemia. Previously increased glimepiride to 2 mg and taking metformin  2 BID.     Lab Results   Component Value Date    HGBA1C 13.50 (H) 10/17/2023    HGBA1C 11.20 (H) 06/30/2023    HGBA1C 8.90 (H) 02/24/2023    CREATININE 0.83 10/17/2023    LDL 54 10/17/2023    MALBCRERATIO 12 06/30/2023     Blood sugar readings recorded on patient's flowsheet:       No data " to display               107 kg (236 lb)    REVIEW OF SYSTEMS           PHYSICAL EXAMINATION     Physical Exam  Alert with normal thought and judgment.   Obese       REVIEWED DATA     Labs:     Lab Results   Component Value Date     10/17/2023    K 4.3 10/17/2023    CALCIUM 10.3 10/17/2023    AST 19 10/17/2023    ALT 24 10/17/2023    BUN 15 10/17/2023    CREATININE 0.83 10/17/2023    CREATININE 0.77 06/30/2023    CREATININE 0.69 (L) 09/26/2022    EGFRIFNONA 98 02/18/2022    EGFRIFAFRI 113 02/18/2022       Lab Results   Component Value Date    HGBA1C 13.50 (H) 10/17/2023    HGBA1C 11.20 (H) 06/30/2023    HGBA1C 8.90 (H) 02/24/2023       Lab Results   Component Value Date    LDL 54 10/17/2023    LDL 58 06/30/2023    LDL 92 09/26/2022    HDL 41 10/17/2023    HDL 39 (L) 06/30/2023    TRIG 323 (H) 10/17/2023    TRIG 367 (H) 06/30/2023       Lab Results   Component Value Date    TSH 2.940 01/19/2018    TSH 3.010 05/19/2017    TSH 5.630 (H) 01/20/2017    FREET4 1.24 01/19/2018    FREET4 1.19 05/19/2017    FREET4 1.27 01/20/2017       Lab Results   Component Value Date    WBC 5.68 09/26/2022    HGB 16.2 09/26/2022     09/26/2022       Lab Results   Component Value Date    MALBCRERATIO 12 06/30/2023        Imaging:           Medical Tests:           Summary of old records / correspondence / consultant report:           Request outside records:

## 2023-12-12 NOTE — ASSESSMENT & PLAN NOTE
Check A1c today.  Increase glimepiride to 4 mg qAM.   Maintain a low sugar/starch/carbohydrate diet.   Advised son to have him mother come with him next time.  Pending A1c will likely need to start basal insulin.  Discussed CGM.     Lab Results   Component Value Date    HGBA1C 13.50 (H) 10/17/2023    HGBA1C 11.20 (H) 06/30/2023    HGBA1C 8.90 (H) 02/24/2023    CREATININE 0.83 10/17/2023    LDL 54 10/17/2023    MALBCRERATIO 12 06/30/2023

## 2023-12-12 NOTE — PATIENT INSTRUCTIONS
** IMPORTANT MESSAGE FROM DR. CARO **    In our office, your satisfaction is VERY important to us.     You may receive a survey from [facility] by mail or E-mail for you to provide feedback about your visit. This information is invaluable for me to know what we can do to improve our services.     I ask that you please take a few minutes to complete the survey and let us know how we are doing in serving your needs. (You may receive the survey more than once for multiple visits)    Thank You !    Dr. Caro    _________________________________________________________________________________________________________________________      ** ADDITIONAL INSTRUCTION / REMINDERS FROM DR. CARO **    Freestyle [place] 2/3 continuous glucose monitoring

## 2023-12-13 DIAGNOSIS — E78.5 HYPERLIPIDEMIA, UNSPECIFIED HYPERLIPIDEMIA TYPE: Chronic | ICD-10-CM

## 2023-12-13 RX ORDER — ATORVASTATIN CALCIUM 40 MG/1
40 TABLET, FILM COATED ORAL NIGHTLY
Qty: 90 TABLET | Refills: 1 | Status: SHIPPED | OUTPATIENT
Start: 2023-12-13

## 2023-12-13 NOTE — PROGRESS NOTES
CALL PATIENT with results:    Talk to his son or daughter:    A1c level for blood sugar average is significantly lower. Therefore, will proceed with increasing the dose of glimepiride to 4 mg. Will defer insulin for now. Needs to make improvements in his diet.

## 2024-01-09 DIAGNOSIS — E11.65 TYPE 2 DIABETES MELLITUS WITH HYPERGLYCEMIA, WITHOUT LONG-TERM CURRENT USE OF INSULIN: Chronic | ICD-10-CM

## 2024-01-10 RX ORDER — GLIMEPIRIDE 4 MG/1
4 TABLET ORAL
Qty: 30 TABLET | Refills: 3 | Status: SHIPPED | OUTPATIENT
Start: 2024-01-10

## 2024-01-24 DIAGNOSIS — E11.65 TYPE 2 DIABETES MELLITUS WITH HYPERGLYCEMIA, WITHOUT LONG-TERM CURRENT USE OF INSULIN: ICD-10-CM

## 2024-02-12 ENCOUNTER — OFFICE VISIT (OUTPATIENT)
Dept: INTERNAL MEDICINE | Age: 59
End: 2024-02-12
Payer: COMMERCIAL

## 2024-02-12 VITALS — BODY MASS INDEX: 33.18 KG/M2 | HEIGHT: 72 IN | WEIGHT: 245 LBS

## 2024-02-12 DIAGNOSIS — E66.09 CLASS 1 OBESITY DUE TO EXCESS CALORIES WITH SERIOUS COMORBIDITY AND BODY MASS INDEX (BMI) OF 33.0 TO 33.9 IN ADULT: Chronic | ICD-10-CM

## 2024-02-12 DIAGNOSIS — E11.65 TYPE 2 DIABETES MELLITUS WITH HYPERGLYCEMIA, WITHOUT LONG-TERM CURRENT USE OF INSULIN: Primary | Chronic | ICD-10-CM

## 2024-02-12 DIAGNOSIS — E78.2 MIXED HYPERLIPIDEMIA: Chronic | ICD-10-CM

## 2024-02-12 DIAGNOSIS — K76.0 FATTY LIVER: Chronic | ICD-10-CM

## 2024-02-12 DIAGNOSIS — I10 PRIMARY HYPERTENSION: Chronic | ICD-10-CM

## 2024-02-12 LAB — HBA1C MFR BLD: 7.7 % (ref 4.8–5.6)

## 2024-02-12 PROCEDURE — 99214 OFFICE O/P EST MOD 30 MIN: CPT | Performed by: INTERNAL MEDICINE

## 2024-02-13 ENCOUNTER — DOCUMENTATION (OUTPATIENT)
Dept: INTERNAL MEDICINE | Age: 59
End: 2024-02-13
Payer: COMMERCIAL

## 2024-02-13 DIAGNOSIS — E11.65 TYPE 2 DIABETES MELLITUS WITH HYPERGLYCEMIA, WITHOUT LONG-TERM CURRENT USE OF INSULIN: Primary | ICD-10-CM

## 2024-03-16 DIAGNOSIS — E11.65 TYPE 2 DIABETES MELLITUS WITH HYPERGLYCEMIA, WITHOUT LONG-TERM CURRENT USE OF INSULIN: ICD-10-CM

## 2024-03-18 RX ORDER — DAPAGLIFLOZIN 5 MG/1
5 TABLET, FILM COATED ORAL EVERY MORNING
Qty: 30 TABLET | Refills: 5 | Status: SHIPPED | OUTPATIENT
Start: 2024-03-18

## 2024-05-03 DIAGNOSIS — I10 ESSENTIAL HYPERTENSION: ICD-10-CM

## 2024-05-03 DIAGNOSIS — E11.65 TYPE 2 DIABETES MELLITUS WITH HYPERGLYCEMIA, WITHOUT LONG-TERM CURRENT USE OF INSULIN: ICD-10-CM

## 2024-05-03 RX ORDER — METFORMIN HYDROCHLORIDE 500 MG/1
1000 TABLET, EXTENDED RELEASE ORAL 2 TIMES DAILY WITH MEALS
Qty: 120 TABLET | Refills: 5 | Status: SHIPPED | OUTPATIENT
Start: 2024-05-03

## 2024-05-03 RX ORDER — HYDROCHLOROTHIAZIDE 12.5 MG/1
12.5 TABLET ORAL DAILY
Qty: 90 TABLET | Refills: 1 | Status: SHIPPED | OUTPATIENT
Start: 2024-05-03

## 2024-05-06 DIAGNOSIS — I10 ESSENTIAL HYPERTENSION: ICD-10-CM

## 2024-05-06 RX ORDER — OLMESARTAN MEDOXOMIL 40 MG/1
40 TABLET ORAL DAILY
Qty: 90 TABLET | Refills: 1 | Status: SHIPPED | OUTPATIENT
Start: 2024-05-06

## 2024-05-14 DIAGNOSIS — E11.65 TYPE 2 DIABETES MELLITUS WITH HYPERGLYCEMIA, WITHOUT LONG-TERM CURRENT USE OF INSULIN: Chronic | ICD-10-CM

## 2024-05-14 RX ORDER — GLIMEPIRIDE 4 MG/1
4 TABLET ORAL
Qty: 30 TABLET | Refills: 2 | Status: SHIPPED | OUTPATIENT
Start: 2024-05-14

## 2024-05-20 ENCOUNTER — OFFICE VISIT (OUTPATIENT)
Dept: INTERNAL MEDICINE | Age: 59
End: 2024-05-20
Payer: COMMERCIAL

## 2024-05-20 VITALS
HEIGHT: 72 IN | TEMPERATURE: 97.1 F | DIASTOLIC BLOOD PRESSURE: 80 MMHG | BODY MASS INDEX: 32.91 KG/M2 | WEIGHT: 243 LBS | OXYGEN SATURATION: 99 % | SYSTOLIC BLOOD PRESSURE: 104 MMHG | HEART RATE: 76 BPM

## 2024-05-20 DIAGNOSIS — F17.219 CIGARETTE NICOTINE DEPENDENCE WITH NICOTINE-INDUCED DISORDER: Chronic | ICD-10-CM

## 2024-05-20 DIAGNOSIS — E66.09 CLASS 1 OBESITY DUE TO EXCESS CALORIES WITH SERIOUS COMORBIDITY AND BODY MASS INDEX (BMI) OF 33.0 TO 33.9 IN ADULT: Chronic | ICD-10-CM

## 2024-05-20 DIAGNOSIS — E11.65 TYPE 2 DIABETES MELLITUS WITH HYPERGLYCEMIA, WITHOUT LONG-TERM CURRENT USE OF INSULIN: Primary | ICD-10-CM

## 2024-05-20 DIAGNOSIS — E78.2 MIXED HYPERLIPIDEMIA: Chronic | ICD-10-CM

## 2024-05-20 DIAGNOSIS — I10 PRIMARY HYPERTENSION: Chronic | ICD-10-CM

## 2024-05-20 PROCEDURE — 99214 OFFICE O/P EST MOD 30 MIN: CPT | Performed by: INTERNAL MEDICINE

## 2024-05-20 RX ORDER — DAPAGLIFLOZIN 10 MG/1
10 TABLET, FILM COATED ORAL
Qty: 30 TABLET | Refills: 4 | Status: SHIPPED | OUTPATIENT
Start: 2024-05-20

## 2024-05-20 NOTE — ASSESSMENT & PLAN NOTE
BP Readings from Last 3 Encounters:   05/20/24 104/80   12/12/23 120/90   11/16/23 134/82       Blood pressure appears to be running lower with start of Farxiga 5 mg.  Increasing Farxiga to 10 mg for diabetes therefore monitor blood pressure more closely at home.  Advised him to let me know if blood pressure is continuously running less than 110 systolically.  
FBS still > 130-150 at home.   Increase Farxiga to 10 mg qAM.   Maintain a low sugar/starch/carbohydrate diet.   Avoid eating high carbohydrate diet at late dinner time.   Check A1c level.     Lab Results   Component Value Date    HGBA1C 7.70 (H) 02/12/2024    HGBA1C 8.90 (H) 12/12/2023    HGBA1C 13.50 (H) 10/17/2023      
Reports to smoking about 10 cigarettes a day.  I strongly encouraged him to consider smoking cessation.  
Detail Level: Simple
Additional Notes: Patient consent was obtained to proceed with the visit and recommended plan of care after discussion of all risks and benefits, including the risks of COVID-19 exposure.

## 2024-05-20 NOTE — PROGRESS NOTES
I N T E R N A L  M E D I C I N E    J U N O H  K I M,  M D      ENCOUNTER DATE:  05/20/2024    Emil Watts / 59 y.o. / male    CHIEF COMPLAINT / REASON FOR OFFICE VISIT     Diabetes, Hypertension, Hyperlipidemia, and Obesity      ASSESSMENT & PLAN     Problem List Items Addressed This Visit          High    Type 2 diabetes mellitus with hyperglycemia, without long-term current use of insulin - Primary (Chronic)    Current Assessment & Plan     FBS still > 130-150 at home.   Increase Farxiga to 10 mg qAM.   Maintain a low sugar/starch/carbohydrate diet.   Avoid eating high carbohydrate diet at late dinner time.   Check A1c level.     Lab Results   Component Value Date    HGBA1C 7.70 (H) 02/12/2024    HGBA1C 8.90 (H) 12/12/2023    HGBA1C 13.50 (H) 10/17/2023             Relevant Medications    Lancet Devices (ACCU-CHEK SOFTCLIX) lancets    Blood Glucose Monitoring Suppl (ONE TOUCH ULTRA 2) w/Device kit    glucose blood test strip    metFORMIN ER (GLUCOPHAGE-XR) 500 MG 24 hr tablet    glimepiride (AMARYL) 4 MG tablet    dapagliflozin Propanediol (Farxiga) 10 MG tablet    Other Relevant Orders    Comprehensive Metabolic Panel    Hemoglobin A1c    Microalbumin / Creatinine Urine Ratio - Urine, Clean Catch       Medium    HTN (hypertension) (Chronic)    Overview     Continue:    Olmesartan 40 mg QD  HCTZ 12.5 mg QD          Current Assessment & Plan     BP Readings from Last 3 Encounters:   05/20/24 104/80   12/12/23 120/90   11/16/23 134/82       Blood pressure appears to be running lower with start of Farxiga 5 mg.  Increasing Farxiga to 10 mg for diabetes therefore monitor blood pressure more closely at home.  Advised him to let me know if blood pressure is continuously running less than 110 systolically.         Relevant Medications    hydroCHLOROthiazide 12.5 MG tablet    olmesartan (BENICAR) 40 MG tablet    Other Relevant Orders    Comprehensive Metabolic Panel    Hyperlipidemia (Chronic)    Overview      "Continue atorvastatin 40 mg qd.          Relevant Medications    atorvastatin (LIPITOR) 40 MG tablet    Other Relevant Orders    Comprehensive Metabolic Panel       Low    Cigarette nicotine dependence with nicotine-induced disorder (Chronic)    Overview     X 30 yrs         Current Assessment & Plan     Reports to smoking about 10 cigarettes a day.  I strongly encouraged him to consider smoking cessation.         Class 1 obesity due to excess calories with serious comorbidity and body mass index (BMI) of 33.0 to 33.9 in adult (Chronic)     Orders Placed This Encounter   Procedures    Comprehensive Metabolic Panel    Hemoglobin A1c    Microalbumin / Creatinine Urine Ratio - Urine, Clean Catch     New Medications Ordered This Visit   Medications    dapagliflozin Propanediol (Farxiga) 10 MG tablet     Sig: Take 10 mg by mouth Every Morning Before Breakfast.     Dispense:  30 tablet     Refill:  4       SUMMARY/DISCUSSION        Next Appointment with me: Visit date not found    Return in about 4 months (around 9/20/2024) for Reassess chronic medical problems.        VITAL SIGNS     Vitals:    05/20/24 1441   BP: 104/80   Pulse: 76   Temp: 97.1 °F (36.2 °C)   SpO2: 99%   Weight: 110 kg (243 lb)   Height: 182.9 cm (72.01\")       BP Readings from Last 3 Encounters:   05/20/24 104/80   12/12/23 120/90   11/16/23 134/82     Wt Readings from Last 3 Encounters:   05/20/24 110 kg (243 lb)   02/12/24 111 kg (245 lb)   12/12/23 107 kg (236 lb)     Body mass index is 32.95 kg/m².    Blood pressure readings recorded on patient flowsheet:       No data to display                MEDICATIONS AT THE TIME OF OFFICE VISIT     Current Outpatient Medications on File Prior to Visit   Medication Sig    atorvastatin (LIPITOR) 40 MG tablet TAKE 1 TABLET BY MOUTH EVERY NIGHT    Blood Glucose Monitoring Suppl (ONE TOUCH ULTRA 2) w/Device kit Use as instructed.    cholecalciferol (VITAMIN D3) 25 MCG (1000 UT) tablet Take 1 tablet by mouth Daily. "    glimepiride (AMARYL) 4 MG tablet TAKE 1 TABLET BY MOUTH EVERY MORNING BEFORE BREAKFAST    glucose blood test strip Use to check FBS once daily . Dm2/ E11.9    hydroCHLOROthiazide 12.5 MG tablet TAKE 1 TABLET BY MOUTH DAILY    Lancet Devices (ACCU-CHEK SOFTCLIX) lancets Test one time daily    metFORMIN ER (GLUCOPHAGE-XR) 500 MG 24 hr tablet TAKE 2 TABLETS BY MOUTH TWICE DAILY WITH MEALS    olmesartan (BENICAR) 40 MG tablet TAKE 1 TABLET BY MOUTH DAILY    [DISCONTINUED] dapagliflozin (FARXIGA) 5 MG tablet tablet TAKE 1 TABLET BY MOUTH EVERY MORNING     No current facility-administered medications on file prior to visit.         HISTORY OF PRESENT ILLNESS     Previously started on Farxiga 5 mg daily for uncontrolled diabetes with A1c of 7.7.  He denies significant side effects or problems.  Fasting glucose in the mornings are generally still 130s to 150s.  He does report to eating late at night at times in his diet at dinnertime tends to be high in starch.  He does try to exercise by playing golf and swimming several days a week.  Blood pressure seems to be running lower with addition of Farxiga but denies significant lightheadedness or fatigue.  He is on olmesartan 40 mg and hydrochlorothiazide 12.5 mg for hypertension.  Currently for diabetes besides Farxiga 5 mg he is on glimepiride 4 mg in the morning and metformin  mg 2 tablets twice daily.  He remains on atorvastatin 40 mg for hyperlipidemia without problems.    Lab Results   Component Value Date    HGBA1C 7.70 (H) 02/12/2024    HGBA1C 8.90 (H) 12/12/2023    HGBA1C 13.50 (H) 10/17/2023    CREATININE 0.83 10/17/2023    LDL 54 10/17/2023    MALBCRERATIO 12 06/30/2023     Blood sugar readings recorded on patient's flowsheet:       No data to display               110 kg (243 lb)    Patient Care Team:  Al Francisco MD as PCP - General (Internal Medicine)    REVIEW OF SYSTEMS     Constitutional neg except per HPI   Resp neg  CV neg   GI negative   negative        PHYSICAL EXAMINATION     Physical Exam  Alert with normal thought and judgment.   Cardiovascular: Normal rate, regular rhythm.  Pulm/Chest: Effort normal, breath sounds normal.   No lower extremity edema       REVIEWED DATA     Labs:       Lab Results   Component Value Date     10/17/2023    K 4.3 10/17/2023    CALCIUM 10.3 10/17/2023    AST 19 10/17/2023    ALT 24 10/17/2023    BUN 15 10/17/2023    CREATININE 0.83 10/17/2023    CREATININE 0.77 06/30/2023    CREATININE 0.69 (L) 09/26/2022    EGFRRESULT 102.7 10/17/2023       Lab Results   Component Value Date    HGBA1C 7.70 (H) 02/12/2024    HGBA1C 8.90 (H) 12/12/2023    HGBA1C 13.50 (H) 10/17/2023       Lab Results   Component Value Date    LDL 54 10/17/2023    LDL 58 06/30/2023    LDL 92 09/26/2022    HDL 41 10/17/2023    HDL 39 (L) 06/30/2023    TRIG 323 (H) 10/17/2023    TRIG 367 (H) 06/30/2023       Lab Results   Component Value Date    TSH 2.940 01/19/2018    TSH 3.010 05/19/2017    TSH 5.630 (H) 01/20/2017    FREET4 1.24 01/19/2018    FREET4 1.19 05/19/2017    FREET4 1.27 01/20/2017       Lab Results   Component Value Date    WBC 5.68 09/26/2022    HGB 16.2 09/26/2022     09/26/2022       Lab Results   Component Value Date    MALBCRERATIO 12 06/30/2023           Imaging:           Medical Tests:           Summary of old records / correspondence / consultant report:           Request outside records:

## 2024-05-22 LAB
ALBUMIN SERPL-MCNC: 4.7 G/DL (ref 3.5–5.2)
ALBUMIN/CREAT UR: <4 MG/G CREAT (ref 0–29)
ALBUMIN/GLOB SERPL: 2.1 G/DL
ALP SERPL-CCNC: 102 U/L (ref 39–117)
ALT SERPL-CCNC: 26 U/L (ref 1–41)
AST SERPL-CCNC: 19 U/L (ref 1–40)
BILIRUB SERPL-MCNC: 0.4 MG/DL (ref 0–1.2)
BUN SERPL-MCNC: 18 MG/DL (ref 6–20)
BUN/CREAT SERPL: 20.9 (ref 7–25)
CALCIUM SERPL-MCNC: 10.2 MG/DL (ref 8.6–10.5)
CHLORIDE SERPL-SCNC: 108 MMOL/L (ref 98–107)
CO2 SERPL-SCNC: 22.6 MMOL/L (ref 22–29)
CREAT SERPL-MCNC: 0.86 MG/DL (ref 0.76–1.27)
CREAT UR-MCNC: 76 MG/DL
EGFRCR SERPLBLD CKD-EPI 2021: 101 ML/MIN/1.73
GLOBULIN SER CALC-MCNC: 2.2 GM/DL
GLUCOSE SERPL-MCNC: 106 MG/DL (ref 65–99)
HBA1C MFR BLD: 7.1 % (ref 4.8–5.6)
MICROALBUMIN UR-MCNC: <3 UG/ML
POTASSIUM SERPL-SCNC: 4.3 MMOL/L (ref 3.5–5.2)
PROT SERPL-MCNC: 6.9 G/DL (ref 6–8.5)
SODIUM SERPL-SCNC: 144 MMOL/L (ref 136–145)

## 2024-05-22 NOTE — PROGRESS NOTES
MyChart:    Here are the result(s) of your test(s):     A1c for average glucose level is significantly improved. Increase Farxiga to 10 mg as discussed.     Kidney function, liver labs are electrolytes are stable/normal.      Urine is negative for protein.       Please do not hesitate to contact me if you have questions.

## 2024-06-10 DIAGNOSIS — E78.5 HYPERLIPIDEMIA, UNSPECIFIED HYPERLIPIDEMIA TYPE: Chronic | ICD-10-CM

## 2024-06-11 RX ORDER — ATORVASTATIN CALCIUM 40 MG/1
40 TABLET, FILM COATED ORAL NIGHTLY
Qty: 90 TABLET | Refills: 1 | Status: SHIPPED | OUTPATIENT
Start: 2024-06-11

## 2024-07-01 DIAGNOSIS — E11.65 TYPE 2 DIABETES MELLITUS WITH HYPERGLYCEMIA, WITHOUT LONG-TERM CURRENT USE OF INSULIN: ICD-10-CM

## 2024-07-02 RX ORDER — METFORMIN HYDROCHLORIDE 500 MG/1
1000 TABLET, EXTENDED RELEASE ORAL 2 TIMES DAILY WITH MEALS
Qty: 120 TABLET | Refills: 2 | Status: SHIPPED | OUTPATIENT
Start: 2024-07-02

## 2024-07-29 DIAGNOSIS — E11.65 TYPE 2 DIABETES MELLITUS WITH HYPERGLYCEMIA, WITHOUT LONG-TERM CURRENT USE OF INSULIN: ICD-10-CM

## 2024-07-30 RX ORDER — DAPAGLIFLOZIN 10 MG/1
10 TABLET, FILM COATED ORAL
Qty: 30 TABLET | Refills: 2 | Status: SHIPPED | OUTPATIENT
Start: 2024-07-30

## 2024-09-20 ENCOUNTER — OFFICE VISIT (OUTPATIENT)
Dept: INTERNAL MEDICINE | Age: 59
End: 2024-09-20
Payer: COMMERCIAL

## 2024-09-20 VITALS
OXYGEN SATURATION: 95 % | HEIGHT: 72 IN | DIASTOLIC BLOOD PRESSURE: 106 MMHG | WEIGHT: 235 LBS | SYSTOLIC BLOOD PRESSURE: 138 MMHG | BODY MASS INDEX: 31.83 KG/M2 | HEART RATE: 74 BPM | TEMPERATURE: 97.1 F

## 2024-09-20 DIAGNOSIS — E78.2 MIXED HYPERLIPIDEMIA: Chronic | ICD-10-CM

## 2024-09-20 DIAGNOSIS — E11.65 TYPE 2 DIABETES MELLITUS WITH HYPERGLYCEMIA, WITHOUT LONG-TERM CURRENT USE OF INSULIN: Chronic | ICD-10-CM

## 2024-09-20 DIAGNOSIS — R29.818 SUSPECTED SLEEP APNEA: ICD-10-CM

## 2024-09-20 DIAGNOSIS — I10 PRIMARY HYPERTENSION: Primary | Chronic | ICD-10-CM

## 2024-09-20 LAB
ALBUMIN SERPL-MCNC: 5 G/DL (ref 3.5–5.2)
ALBUMIN/GLOB SERPL: 2.3 G/DL
ALP SERPL-CCNC: 110 U/L (ref 39–117)
ALT SERPL-CCNC: 27 U/L (ref 1–41)
AST SERPL-CCNC: 20 U/L (ref 1–40)
BILIRUB SERPL-MCNC: 0.5 MG/DL (ref 0–1.2)
BUN SERPL-MCNC: 19 MG/DL (ref 6–20)
BUN/CREAT SERPL: 25 (ref 7–25)
CALCIUM SERPL-MCNC: 9.8 MG/DL (ref 8.6–10.5)
CHLORIDE SERPL-SCNC: 106 MMOL/L (ref 98–107)
CHOLEST SERPL-MCNC: 140 MG/DL (ref 0–200)
CHOLEST/HDLC SERPL: 3.41 {RATIO}
CO2 SERPL-SCNC: 21.1 MMOL/L (ref 22–29)
CREAT SERPL-MCNC: 0.76 MG/DL (ref 0.76–1.27)
EGFRCR SERPLBLD CKD-EPI 2021: 104.8 ML/MIN/1.73
GLOBULIN SER CALC-MCNC: 2.2 GM/DL
GLUCOSE SERPL-MCNC: 122 MG/DL (ref 65–99)
HBA1C MFR BLD: 7.3 % (ref 4.8–5.6)
HDLC SERPL-MCNC: 41 MG/DL (ref 40–60)
LDLC SERPL CALC-MCNC: 70 MG/DL (ref 0–100)
POTASSIUM SERPL-SCNC: 4.3 MMOL/L (ref 3.5–5.2)
PROT SERPL-MCNC: 7.2 G/DL (ref 6–8.5)
SODIUM SERPL-SCNC: 140 MMOL/L (ref 136–145)
TRIGL SERPL-MCNC: 168 MG/DL (ref 0–150)
VLDLC SERPL CALC-MCNC: 29 MG/DL (ref 5–40)

## 2024-09-20 PROCEDURE — 99214 OFFICE O/P EST MOD 30 MIN: CPT | Performed by: INTERNAL MEDICINE

## 2024-09-20 RX ORDER — OLMESARTAN MEDOXOMIL 40 MG/1
40 TABLET ORAL DAILY
COMMUNITY
Start: 2024-09-20

## 2024-09-20 RX ORDER — HYDROCHLOROTHIAZIDE 12.5 MG/1
12.5 TABLET ORAL DAILY
COMMUNITY
Start: 2024-09-20

## 2024-09-20 RX ORDER — GLIMEPIRIDE 4 MG/1
4 TABLET ORAL
Qty: 90 TABLET | Refills: 1 | Status: SHIPPED | OUTPATIENT
Start: 2024-09-20

## 2024-09-20 RX ORDER — AMLODIPINE BESYLATE 5 MG/1
5 TABLET ORAL EVERY EVENING
Qty: 90 TABLET | Refills: 1 | Status: SHIPPED | OUTPATIENT
Start: 2024-09-20

## 2024-10-01 DIAGNOSIS — E11.65 TYPE 2 DIABETES MELLITUS WITH HYPERGLYCEMIA, WITHOUT LONG-TERM CURRENT USE OF INSULIN: ICD-10-CM

## 2024-10-01 RX ORDER — METFORMIN HCL 500 MG
1000 TABLET, EXTENDED RELEASE 24 HR ORAL 2 TIMES DAILY WITH MEALS
Qty: 120 TABLET | Refills: 5 | Status: SHIPPED | OUTPATIENT
Start: 2024-10-01

## 2024-10-09 ENCOUNTER — PATIENT MESSAGE (OUTPATIENT)
Dept: INTERNAL MEDICINE | Age: 59
End: 2024-10-09
Payer: COMMERCIAL

## 2024-10-09 DIAGNOSIS — E11.65 TYPE 2 DIABETES MELLITUS WITH HYPERGLYCEMIA, WITHOUT LONG-TERM CURRENT USE OF INSULIN: Primary | ICD-10-CM

## 2024-10-09 DIAGNOSIS — E11.65 TYPE 2 DIABETES MELLITUS WITH HYPERGLYCEMIA, WITHOUT LONG-TERM CURRENT USE OF INSULIN: ICD-10-CM

## 2024-10-10 RX ORDER — DAPAGLIFLOZIN 10 MG/1
10 TABLET, FILM COATED ORAL
Qty: 30 TABLET | Refills: 5 | Status: SHIPPED | OUTPATIENT
Start: 2024-10-10

## 2024-10-10 RX ORDER — METFORMIN HCL 500 MG
1000 TABLET, EXTENDED RELEASE 24 HR ORAL 2 TIMES DAILY WITH MEALS
Qty: 120 TABLET | Refills: 5 | OUTPATIENT
Start: 2024-10-10

## 2024-10-11 RX ORDER — BLOOD-GLUCOSE METER
1 EACH MISCELLANEOUS DAILY
Qty: 1 KIT | Refills: 0 | Status: SHIPPED | OUTPATIENT
Start: 2024-10-11

## 2024-10-11 NOTE — TELEPHONE ENCOUNTER
From: Emil Watts  To: Al Francisco  Sent: 10/9/2024 9:37 PM EDT  Subject: Blood pressure meter and prescription inquiry    Hello,  This is Emil Watts’s daughter.  The blood pressure meter that he has been using (contour next meter) is broken, so we were wondering if we could get prescription for that to replace?    And he was recently told from the pharmacy that we can set up auto refill for his medications, so could you set that up for him?    Thank you.

## 2024-11-01 DIAGNOSIS — I10 PRIMARY HYPERTENSION: Chronic | ICD-10-CM

## 2024-11-01 RX ORDER — HYDROCHLOROTHIAZIDE 12.5 MG/1
12.5 TABLET ORAL DAILY
Qty: 90 TABLET | Refills: 0 | Status: SHIPPED | OUTPATIENT
Start: 2024-11-01

## 2024-11-11 DIAGNOSIS — E11.65 TYPE 2 DIABETES MELLITUS WITH HYPERGLYCEMIA, WITHOUT LONG-TERM CURRENT USE OF INSULIN: ICD-10-CM

## 2024-11-11 DIAGNOSIS — I10 PRIMARY HYPERTENSION: Chronic | ICD-10-CM

## 2024-11-11 RX ORDER — HYDROCHLOROTHIAZIDE 12.5 MG/1
12.5 TABLET ORAL DAILY
Qty: 90 TABLET | Refills: 1 | Status: SHIPPED | OUTPATIENT
Start: 2024-11-11

## 2024-11-11 RX ORDER — OLMESARTAN MEDOXOMIL 40 MG/1
40 TABLET ORAL DAILY
Qty: 90 TABLET | Refills: 1 | Status: SHIPPED | OUTPATIENT
Start: 2024-11-11

## 2024-11-11 RX ORDER — METFORMIN HYDROCHLORIDE 500 MG/1
1000 TABLET, EXTENDED RELEASE ORAL 2 TIMES DAILY WITH MEALS
Qty: 360 TABLET | Refills: 1 | Status: SHIPPED | OUTPATIENT
Start: 2024-11-11

## 2024-11-11 RX ORDER — DAPAGLIFLOZIN 10 MG/1
10 TABLET, FILM COATED ORAL
Qty: 90 TABLET | Refills: 1 | Status: SHIPPED | OUTPATIENT
Start: 2024-11-11

## 2024-11-26 ENCOUNTER — TELEPHONE (OUTPATIENT)
Dept: INTERNAL MEDICINE | Age: 59
End: 2024-11-26
Payer: COMMERCIAL

## 2024-11-26 DIAGNOSIS — I10 PRIMARY HYPERTENSION: Chronic | ICD-10-CM

## 2024-11-26 NOTE — TELEPHONE ENCOUNTER
Spoke with daughter . Said pharmacy requesting provider office  to contact the pharmacy .   Informed I will call them to clarify

## 2024-11-26 NOTE — TELEPHONE ENCOUNTER
Caller: David Watts    Relationship: Emergency Contact    Best call back number: [phone]    Which medication are you concerned about: hydroCHLOROthiazide 12.5 MG tablet   olmesartan (BENICAR) 40 MG tablet     What are your concerns: YUE SAID THAT THE PATIENT NEEDED TO DISCUSS THESE MEDICATIONS WITH DR. CARO BEFORE THEY COULD REFILL. PLEASE ADVISE ASAP. PATIENT ONLY HAS ONE LEFT OF EACH AND NEEDS THIS TO BE PICKED UP TODAY.

## 2024-11-27 RX ORDER — OLMESARTAN MEDOXOMIL 40 MG/1
40 TABLET ORAL DAILY
Qty: 90 TABLET | Refills: 0 | Status: SHIPPED | OUTPATIENT
Start: 2024-11-27

## 2024-11-27 NOTE — TELEPHONE ENCOUNTER
Pt daughter called they are having problem with the [facility] pt will be out of rx today . Asked if you can send this to pharmacy downstairs .

## 2024-12-18 DIAGNOSIS — E11.65 TYPE 2 DIABETES MELLITUS WITH HYPERGLYCEMIA, WITHOUT LONG-TERM CURRENT USE OF INSULIN: Chronic | ICD-10-CM

## 2024-12-18 DIAGNOSIS — I10 PRIMARY HYPERTENSION: Chronic | ICD-10-CM

## 2024-12-20 RX ORDER — AMLODIPINE BESYLATE 5 MG/1
5 TABLET ORAL EVERY EVENING
Qty: 90 TABLET | Refills: 1 | Status: SHIPPED | OUTPATIENT
Start: 2024-12-20

## 2024-12-20 RX ORDER — GLIMEPIRIDE 4 MG/1
4 TABLET ORAL
Qty: 90 TABLET | Refills: 1 | Status: SHIPPED | OUTPATIENT
Start: 2024-12-20

## 2024-12-21 DIAGNOSIS — E78.5 HYPERLIPIDEMIA, UNSPECIFIED HYPERLIPIDEMIA TYPE: Chronic | ICD-10-CM

## 2024-12-23 RX ORDER — ATORVASTATIN CALCIUM 40 MG/1
40 TABLET, FILM COATED ORAL NIGHTLY
Qty: 90 TABLET | Refills: 1 | Status: SHIPPED | OUTPATIENT
Start: 2024-12-23

## 2025-01-24 ENCOUNTER — OFFICE VISIT (OUTPATIENT)
Dept: INTERNAL MEDICINE | Age: 60
End: 2025-01-24
Payer: COMMERCIAL

## 2025-01-24 VITALS
HEART RATE: 77 BPM | WEIGHT: 233 LBS | BODY MASS INDEX: 31.56 KG/M2 | SYSTOLIC BLOOD PRESSURE: 124 MMHG | HEIGHT: 72 IN | TEMPERATURE: 97.1 F | OXYGEN SATURATION: 95 % | DIASTOLIC BLOOD PRESSURE: 72 MMHG

## 2025-01-24 DIAGNOSIS — I10 PRIMARY HYPERTENSION: Chronic | ICD-10-CM

## 2025-01-24 DIAGNOSIS — E11.65 TYPE 2 DIABETES MELLITUS WITH HYPERGLYCEMIA, WITHOUT LONG-TERM CURRENT USE OF INSULIN: Primary | Chronic | ICD-10-CM

## 2025-01-24 DIAGNOSIS — E66.09 CLASS 1 OBESITY DUE TO EXCESS CALORIES WITH SERIOUS COMORBIDITY AND BODY MASS INDEX (BMI) OF 31.0 TO 31.9 IN ADULT: Chronic | ICD-10-CM

## 2025-01-24 DIAGNOSIS — E66.811 CLASS 1 OBESITY DUE TO EXCESS CALORIES WITH SERIOUS COMORBIDITY AND BODY MASS INDEX (BMI) OF 31.0 TO 31.9 IN ADULT: Chronic | ICD-10-CM

## 2025-01-24 DIAGNOSIS — E78.2 MIXED HYPERLIPIDEMIA: Chronic | ICD-10-CM

## 2025-01-24 PROCEDURE — 99214 OFFICE O/P EST MOD 30 MIN: CPT | Performed by: INTERNAL MEDICINE

## 2025-01-24 PROCEDURE — 90656 IIV3 VACC NO PRSV 0.5 ML IM: CPT | Performed by: INTERNAL MEDICINE

## 2025-01-24 PROCEDURE — 90471 IMMUNIZATION ADMIN: CPT | Performed by: INTERNAL MEDICINE

## 2025-01-24 NOTE — LETTER
[facility]  Vaccine Consent Form    Patient Name:  Emil Watts  Patient :  1965     Vaccine(s) Ordered    Fluzone >6mos        Screening Checklist  The following questions should be completed prior to vaccination. If you answer “yes” to any question, it does not necessarily mean you should not be vaccinated. It just means we may need to clarify or ask more questions. If a question is unclear, please ask your healthcare provider to explain it.    Yes No   Any fever or moderate to severe illness today (mild illness and/or antibiotic treatment are not contraindications)?     Do you have a history of a serious reaction to any previous vaccinations, such as anaphylaxis, encephalopathy within 7 days, Guillain-Lincoln syndrome within 6 weeks, seizure?     Have you received any live vaccine(s) (e.g MMR, MICHELLE) or any other vaccines in the last month (to ensure duplicate doses aren't given)?     Do you have an anaphylactic allergy to latex (DTaP, DTaP-IPV, Hep A, Hep B, MenB, RV, Td, Tdap), baker’s yeast (Hep B, HPV), polysorbates (RSV, nirsevimab, PCV 20, Rotavirrus, Tdap, Shingrix), or gelatin (MICHELLE, MMR)?     Do you have an anaphylactic allergy to neomycin (Rabies, MICHELLE, MMR, IPV, Hep A), polymyxin B (IPV), or streptomycin (IPV)?      Any cancer, leukemia, AIDS, or other immune system disorder? (MICHELLE, MMR, RV)     Do you have a parent, brother, or sister with an immune system problem (if immune competence of vaccine recipient clinically verified, can proceed)? (MMR, MICHELLE)     Any recent steroid treatments for >2 weeks, chemotherapy, or radiation treatment? (MICHELLE, MMR)     Have you received antibody-containing blood transfusions or IVIG in the past 11 months (recommended interval is dependent on product)? (MMR, MICHELLE)     Have you taken antiviral drugs (acyclovir, famciclovir, valacyclovir for MICHELLE) in the last 24 or 48 hours, respectively?      Are you pregnant or planning to become pregnant within 1 month? (MICHELLE, MMR,  "HPV, IPV, MenB, Abrexvy; For Hep B- refer to Engerix-B; For RSV - Abrysvo is indicated for 32-36 weeks of pregnancy from September to January)     For infants, have you ever been told your child has had intussusception or a medical emergency involving obstruction of the intestine (Rotavirus)? If not for an infant, can skip this question.         *Ordering Physicians/APC should be consulted if \"yes\" is checked by the patient or guardian above.  I have received, read, and understand the Vaccine Information Statement (VIS) for each vaccine ordered.  I have considered my or my child's health status as well as the health status of my close contacts.  I have taken the opportunity to discuss my vaccine questions with my or my child's health care provider.   I have requested that the ordered vaccine(s) be given to me or my child.  I understand the benefits and risks of the vaccines.  I understand that I should remain in the clinic for 15 minutes after receiving the vaccine(s).  _________________________________________________________  Signature of Patient or Parent/Legal Guardian ____________________  Date   "

## 2025-01-24 NOTE — ASSESSMENT & PLAN NOTE
Lab Results   Component Value Date    HGBA1C 7.30 (H) 09/20/2024    HGBA1C 7.10 (H) 05/20/2024    HGBA1C 7.70 (H) 02/12/2024    CREATININE 0.76 09/20/2024    LDL 70 09/20/2024    MALBCRERATIO <4 05/20/2024      Check A1c today and 1 week prior to follow-up   Discontinue glimepiride 4 mg (risk for hypoglycemia)  Start Januvia 100 mg qAM (if covered)  Continue metformin ER 4 tabs daily and Farxiga 10 mg

## 2025-01-24 NOTE — PROGRESS NOTES
J  U  N  O  H    K  I  M ,   M  D      I  N  T  E  R  N  A  L    M  E  D  I  C  I  N  E         ENCOUNTER DATE:  01/24/2025    Emil Watts / 59 y.o. / male    OFFICE VISIT ENCOUNTER       CHIEF COMPLAINT / REASON FOR OFFICE VISIT     Diabetes and Hypertension      ASSESSMENT & PLAN     Problem List Items Addressed This Visit          High    Type 2 diabetes mellitus with hyperglycemia, without long-term current use of insulin - Primary (Chronic)    Current Assessment & Plan     Lab Results   Component Value Date    HGBA1C 7.30 (H) 09/20/2024    HGBA1C 7.10 (H) 05/20/2024    HGBA1C 7.70 (H) 02/12/2024    CREATININE 0.76 09/20/2024    LDL 70 09/20/2024    MALBCRERATIO <4 05/20/2024      Check A1c today and 1 week prior to follow-up   Discontinue glimepiride 4 mg (risk for hypoglycemia)  Start Januvia 100 mg qAM (if covered)  Continue metformin ER 4 tabs daily and Farxiga 10 mg         Relevant Medications    Lancet Devices (ACCU-CHEK SOFTCLIX) lancets    Blood Glucose Monitoring Suppl (ONE TOUCH ULTRA 2) w/Device kit    glucose blood (Contour Next Test) test strip    Blood Glucose Monitoring Suppl (Contour Next Monitor) w/Device kit    metFORMIN ER (GLUCOPHAGE-XR) 500 MG 24 hr tablet    Farxiga 10 MG tablet    SITagliptin (Januvia) 100 MG tablet    Other Relevant Orders    Hemoglobin A1c    Hemoglobin A1c       Medium    HTN (hypertension) (Chronic)    Current Assessment & Plan     BP Readings from Last 3 Encounters:   01/24/25 124/72   09/20/24 (!) 138/106   05/20/24 104/80       Blood pressure is improved with addition of amlodipine 5 mg.  Continue olmesartan 40 mg, hydrochlorothiazide 12.5 mg and amlodipine 5 mg daily.         Relevant Medications    hydroCHLOROthiazide 12.5 MG tablet    olmesartan (BENICAR) 40 MG tablet    amLODIPine (NORVASC) 5 MG tablet    Hyperlipidemia (Chronic)    Overview     Continue atorvastatin 40 mg qd.          Current Assessment & Plan     Lab Results   Component Value Date  "   LDL 70 09/20/2024    LDL 54 10/17/2023    LDL 58 06/30/2023    TRIG 168 (H) 09/20/2024    CHOLHDLRATIO 3.41 09/20/2024       Continue atorvastatin 40 mg         Relevant Medications    atorvastatin (LIPITOR) 40 MG tablet       Low    Class 1 obesity due to excess calories with serious comorbidity and body mass index (BMI) of 31.0 to 31.9 in adult (Chronic)    Current Assessment & Plan     Wt Readings from Last 3 Encounters:   01/24/25 106 kg (233 lb)   09/20/24 107 kg (235 lb)   05/20/24 110 kg (243 lb)     Body mass index is 31.59 kg/m².    Discussed GLP-1 agonist for weight loss.  Improve diet with maintenance of lower carbohydrate diet.          Orders Placed This Encounter   Procedures    Fluzone >6mos    Hemoglobin A1c     Order Specific Question:   Release to patient     Answer:   Routine Release [[id number]]    Hemoglobin A1c     Standing Status:   Future     Standing Expiration Date:   1/24/2026     Order Specific Question:   Release to patient     Answer:   Routine Release [[id number]]     New Medications Ordered This Visit   Medications    SITagliptin (Januvia) 100 MG tablet     Sig: Take 1 tablet by mouth Daily.     Dispense:  30 tablet     Refill:  4       SUMMARY/DISCUSSION        TOTAL TIME OF ENCOUNTER:      Next Appointment with me: Visit date not found    Return in about 4 months (around 5/24/2025) for Diabetes, NONFASTING LABS 1 WEEK PRIOR TO FOLLOWUP.      VITAL SIGNS     Vitals:    01/24/25 1010   BP: 124/72   Pulse: 77   Temp: 97.1 °F (36.2 °C)   SpO2: 95%   Weight: 106 kg (233 lb)   Height: 182.9 cm (72.01\")       BP Readings from Last 3 Encounters:   01/24/25 124/72   09/20/24 (!) 138/106   05/20/24 104/80     Wt Readings from Last 3 Encounters:   01/24/25 106 kg (233 lb)   09/20/24 107 kg (235 lb)   05/20/24 110 kg (243 lb)     Body mass index is 31.59 kg/m².    Blood pressure readings recorded on patient flowsheet:       No data to display                MEDICATIONS AT THE TIME OF OFFICE " VISIT     Current Outpatient Medications on File Prior to Visit   Medication Sig    amLODIPine (NORVASC) 5 MG tablet Take 1 tablet by mouth Every Evening.    atorvastatin (LIPITOR) 40 MG tablet TAKE 1 TABLET BY MOUTH EVERY NIGHT    Blood Glucose Monitoring Suppl (Contour Next Monitor) w/Device kit Use 1 Device Daily. type 2 diabetes mellitus with hyperglycemia, without long-term current use of insulin. E11.65    Blood Glucose Monitoring Suppl (ONE TOUCH ULTRA 2) w/Device kit Use as instructed.    cholecalciferol (VITAMIN D3) 25 MCG (1000 UT) tablet Take 1 tablet by mouth Daily.    Farxiga 10 MG tablet Take 10 mg by mouth Every Morning Before Breakfast.    glucose blood (Contour Next Test) test strip CHECK FASTING BLOOD SUGAR ONCE DAILY    hydroCHLOROthiazide 12.5 MG tablet Take 1 tablet by mouth Daily.    Lancet Devices (ACCU-CHEK SOFTCLIX) lancets Test one time daily    metFORMIN ER (GLUCOPHAGE-XR) 500 MG 24 hr tablet Take 2 tablets by mouth 2 (Two) Times a Day With Meals.    olmesartan (BENICAR) 40 MG tablet Take 1 tablet by mouth Daily.    [DISCONTINUED] glimepiride (AMARYL) 4 MG tablet Take 1 tablet by mouth Every Morning Before Breakfast.     No current facility-administered medications on file prior to visit.         HISTORY OF PRESENT ILLNESS     Patient presents with his son today.    Previous A1c was higher at 7.3 from 7.1.  Patient currently takes glimepiride 4 mg in the morning, metformin  mg 1 tablet in the morning and 3 tablets with dinner along with Farxiga 10 mg daily.  His weight overall remains about the same.  Patient's son states that his mother verbalized concern about him taking all his diabetes medication without him eating a substantial breakfast.  He normally is an apple and maybe an egg for breakfast.  He does not monitor his glucose consistently.  He denies any overt symptoms of severe hypoglycemia.  Hypertension is improved with addition of amlodipine 5 mg previously.  He remains on  olmesartan 40 mg and hydrochlorothiazide 12.5 mg.  He takes atorvastatin 40 mg for hyperlipidemia without problems.  He reports to still smoking about 1/2 pack per day.  He states that he now drinks only 1 day a week but has about 4-5 drinks.    Lab Results   Component Value Date    HGBA1C 7.30 (H) 09/20/2024    HGBA1C 7.10 (H) 05/20/2024    HGBA1C 7.70 (H) 02/12/2024    CREATININE 0.76 09/20/2024    LDL 70 09/20/2024    MALBCRERATIO <4 05/20/2024     Blood sugar readings recorded on patient's flowsheet:       No data to display               106 kg (233 lb)    Patient Care Team:  Al Francisco MD as PCP - General (Internal Medicine)    REVIEW OF SYSTEMS           PHYSICAL EXAMINATION     Physical Exam  Alert with normal thought and judgment.   Obesity       REVIEWED DATA     Labs:       Lab Results   Component Value Date     09/20/2024    K 4.3 09/20/2024    CALCIUM 9.8 09/20/2024    AST 20 09/20/2024    ALT 27 09/20/2024    BUN 19 09/20/2024    CREATININE 0.76 09/20/2024    CREATININE 0.86 05/20/2024    CREATININE 0.83 10/17/2023    EGFRRESULT 104.8 09/20/2024     Lab Results   Component Value Date    HGBA1C 7.30 (H) 09/20/2024    HGBA1C 7.10 (H) 05/20/2024    HGBA1C 7.70 (H) 02/12/2024     Lab Results   Component Value Date    LDL 70 09/20/2024    LDL 54 10/17/2023    LDL 58 06/30/2023    HDL 41 09/20/2024    HDL 41 10/17/2023    TRIG 168 (H) 09/20/2024    TRIG 323 (H) 10/17/2023     Lab Results   Component Value Date    TSH 2.940 01/19/2018    TSH 3.010 05/19/2017    TSH 5.630 (H) 01/20/2017    FREET4 1.24 01/19/2018    FREET4 1.19 05/19/2017    FREET4 1.27 01/20/2017     Lab Results   Component Value Date    WBC 5.68 09/26/2022    HGB 16.2 09/26/2022     09/26/2022     Lab Results   Component Value Date    MALBCRERATIO <4 05/20/2024         Imaging:           Medical Tests:           Summary of old records / correspondence / consultant report:           Request outside records:

## 2025-01-24 NOTE — ASSESSMENT & PLAN NOTE
Wt Readings from Last 3 Encounters:   01/24/25 106 kg (233 lb)   09/20/24 107 kg (235 lb)   05/20/24 110 kg (243 lb)     Body mass index is 31.59 kg/m².    Discussed GLP-1 agonist for weight loss.  Improve diet with maintenance of lower carbohydrate diet.

## 2025-01-24 NOTE — ASSESSMENT & PLAN NOTE
BP Readings from Last 3 Encounters:   01/24/25 124/72   09/20/24 (!) 138/106   05/20/24 104/80       Blood pressure is improved with addition of amlodipine 5 mg.  Continue olmesartan 40 mg, hydrochlorothiazide 12.5 mg and amlodipine 5 mg daily.

## 2025-01-24 NOTE — ASSESSMENT & PLAN NOTE
Lab Results   Component Value Date    LDL 70 09/20/2024    LDL 54 10/17/2023    LDL 58 06/30/2023    TRIG 168 (H) 09/20/2024    CHOLHDLRATIO 3.41 09/20/2024       Continue atorvastatin 40 mg

## 2025-01-25 LAB — HBA1C MFR BLD: 7.2 % (ref 4.8–5.6)

## 2025-01-27 NOTE — PROGRESS NOTES
CALL PATIENT with results:    UTIL[employer] Georgian  or talk to his son.     A1c level for blood sugar average is slightly lower. As we are switching glimepiride to Januvia he must check his glucose level consistently. Schedule A1c week prior to followup.

## 2025-01-28 DIAGNOSIS — E78.5 HYPERLIPIDEMIA, UNSPECIFIED HYPERLIPIDEMIA TYPE: Chronic | ICD-10-CM

## 2025-01-28 DIAGNOSIS — I10 PRIMARY HYPERTENSION: Chronic | ICD-10-CM

## 2025-01-30 RX ORDER — HYDROCHLOROTHIAZIDE 12.5 MG/1
12.5 TABLET ORAL DAILY
Qty: 90 TABLET | Refills: 1 | Status: SHIPPED | OUTPATIENT
Start: 2025-01-30

## 2025-01-30 RX ORDER — ATORVASTATIN CALCIUM 40 MG/1
40 TABLET, FILM COATED ORAL NIGHTLY
Qty: 90 TABLET | Refills: 1 | Status: SHIPPED | OUTPATIENT
Start: 2025-01-30

## 2025-02-11 DIAGNOSIS — E11.65 TYPE 2 DIABETES MELLITUS WITH HYPERGLYCEMIA, WITHOUT LONG-TERM CURRENT USE OF INSULIN: ICD-10-CM

## 2025-02-12 RX ORDER — METFORMIN HYDROCHLORIDE 500 MG/1
1000 TABLET, EXTENDED RELEASE ORAL 2 TIMES DAILY WITH MEALS
Qty: 360 TABLET | Refills: 1 | Status: SHIPPED | OUTPATIENT
Start: 2025-02-12

## 2025-02-12 RX ORDER — DAPAGLIFLOZIN 10 MG/1
10 TABLET, FILM COATED ORAL
Qty: 90 TABLET | Refills: 1 | Status: SHIPPED | OUTPATIENT
Start: 2025-02-12

## 2025-02-18 DIAGNOSIS — I10 PRIMARY HYPERTENSION: Chronic | ICD-10-CM

## 2025-02-18 DIAGNOSIS — E11.65 TYPE 2 DIABETES MELLITUS WITH HYPERGLYCEMIA, WITHOUT LONG-TERM CURRENT USE OF INSULIN: ICD-10-CM

## 2025-02-18 RX ORDER — OLMESARTAN MEDOXOMIL 40 MG/1
40 TABLET ORAL DAILY
Qty: 90 TABLET | Refills: 1 | Status: SHIPPED | OUTPATIENT
Start: 2025-02-18

## 2025-02-19 RX ORDER — METFORMIN HYDROCHLORIDE 500 MG/1
1000 TABLET, EXTENDED RELEASE ORAL 2 TIMES DAILY WITH MEALS
Qty: 360 TABLET | Refills: 1 | Status: SHIPPED | OUTPATIENT
Start: 2025-02-19

## 2025-05-19 DIAGNOSIS — E11.65 TYPE 2 DIABETES MELLITUS WITH HYPERGLYCEMIA, WITHOUT LONG-TERM CURRENT USE OF INSULIN: ICD-10-CM

## 2025-05-21 RX ORDER — DAPAGLIFLOZIN 10 MG/1
10 TABLET, FILM COATED ORAL
Qty: 90 TABLET | Refills: 1 | Status: SHIPPED | OUTPATIENT
Start: 2025-05-21

## 2025-05-30 ENCOUNTER — OFFICE VISIT (OUTPATIENT)
Dept: INTERNAL MEDICINE | Age: 60
End: 2025-05-30
Payer: COMMERCIAL

## 2025-05-30 VITALS
WEIGHT: 233 LBS | HEART RATE: 72 BPM | OXYGEN SATURATION: 95 % | HEIGHT: 72 IN | DIASTOLIC BLOOD PRESSURE: 74 MMHG | BODY MASS INDEX: 31.56 KG/M2 | TEMPERATURE: 96.9 F | SYSTOLIC BLOOD PRESSURE: 108 MMHG

## 2025-05-30 DIAGNOSIS — I10 PRIMARY HYPERTENSION: Chronic | ICD-10-CM

## 2025-05-30 DIAGNOSIS — E11.65 TYPE 2 DIABETES MELLITUS WITH HYPERGLYCEMIA, WITHOUT LONG-TERM CURRENT USE OF INSULIN: Primary | Chronic | ICD-10-CM

## 2025-05-30 DIAGNOSIS — E78.2 MIXED HYPERLIPIDEMIA: Chronic | ICD-10-CM

## 2025-05-30 PROCEDURE — 99214 OFFICE O/P EST MOD 30 MIN: CPT | Performed by: INTERNAL MEDICINE

## 2025-05-30 NOTE — PROGRESS NOTES
J  U  N  O  H    K  I  M ,   M  D      I  N  T  E  R  N  A  L    M  E  D  I  C  I  N  E         ENCOUNTER DATE:  05/30/2025    Emil Watts / 60 y.o. / male    OFFICE VISIT ENCOUNTER       CHIEF COMPLAINT / REASON FOR OFFICE VISIT     Diabetes      ASSESSMENT & PLAN     Problem List Items Addressed This Visit          High    Type 2 diabetes mellitus with hyperglycemia, without long-term current use of insulin - Primary (Chronic)    Current Assessment & Plan   Lab Results   Component Value Date    HGBA1C 7.20 (H) 05/22/2025    HGBA1C 7.2 (H) 01/24/2025    HGBA1C 7.30 (H) 09/20/2024    CREATININE 0.76 09/20/2024    LDL 70 09/20/2024    MALBCRERATIO <4 05/20/2024      After returning from [place] in 3 weeks, start Ozempic 0.25 mg weekly x 4 weeks, then 0.5 mg weekly.   Continue Januvia 100 mg for 4 weeks, then discontinue.   Continue metformin  mg 2 BID and Farxiga (dapagliflozin) 10 mg qAM.  A1c prior to follow-up in 3 months.          Relevant Medications    Lancet Devices (ACCU-CHEK SOFTCLIX) lancets    Blood Glucose Monitoring Suppl (ONE TOUCH ULTRA 2) w/Device kit    glucose blood (Contour Next Test) test strip    Blood Glucose Monitoring Suppl (Contour Next Monitor) w/Device kit    metFORMIN ER (GLUCOPHAGE-XR) 500 MG 24 hr tablet    Farxiga 10 MG tablet    SITagliptin (Januvia) 100 MG tablet    Other Relevant Orders    Hemoglobin A1c    Microalbumin / Creatinine Urine Ratio - Urine, Clean Catch       Medium    HTN (hypertension) (Chronic)    Current Assessment & Plan   BP Readings from Last 3 Encounters:   05/30/25 108/74   01/24/25 124/72   09/20/24 (!) 138/106      Blood pressure is lower. Monitor home blood pressure readings.  If blood pressure is consistently < 110 SBP, will need to adjust medication.          Relevant Medications    amLODIPine (NORVASC) 5 MG tablet    hydroCHLOROthiazide 12.5 MG tablet    olmesartan (BENICAR) 40 MG tablet    Hyperlipidemia (Chronic)    Overview   Continue  "atorvastatin 40 mg qd.          Relevant Medications    atorvastatin (LIPITOR) 40 MG tablet     Orders Placed This Encounter   Procedures    Hemoglobin A1c     Standing Status:   Future     Expected Date:   8/8/2025     Expiration Date:   5/30/2026     Release to patient:   Routine Release [[id number]]    Microalbumin / Creatinine Urine Ratio - Urine, Clean Catch     Release to patient:   Routine Release [[id number]]     New Medications Ordered This Visit   Medications    SITagliptin (Januvia) 100 MG tablet     Sig: Take 1 tablet by mouth Daily.     Dispense:  90 tablet     Refill:  1       SUMMARY/DISCUSSION        TOTAL TIME OF ENCOUNTER:      Next Appointment with me: Visit date not found    Return in about 3 months (around 8/30/2025) for Diabetes.      VITAL SIGNS     Vitals:    05/30/25 1019   BP: 108/74   Pulse: 72   Temp: 96.9 °F (36.1 °C)   SpO2: 95%   Weight: 106 kg (233 lb)   Height: 182.9 cm (72.01\")       BP Readings from Last 3 Encounters:   05/30/25 108/74   01/24/25 124/72   09/20/24 (!) 138/106     Wt Readings from Last 3 Encounters:   05/30/25 106 kg (233 lb)   01/24/25 106 kg (233 lb)   09/20/24 107 kg (235 lb)     Body mass index is 31.59 kg/m².    Blood pressure readings recorded on patient flowsheet:       No data to display                MEDICATIONS AT THE TIME OF OFFICE VISIT     Current Outpatient Medications on File Prior to Visit   Medication Sig    amLODIPine (NORVASC) 5 MG tablet Take 1 tablet by mouth Every Evening.    atorvastatin (LIPITOR) 40 MG tablet Take 1 tablet by mouth Every Night.    Blood Glucose Monitoring Suppl (Contour Next Monitor) w/Device kit Use 1 Device Daily. type 2 diabetes mellitus with hyperglycemia, without long-term current use of insulin. E11.65    Blood Glucose Monitoring Suppl (ONE TOUCH ULTRA 2) w/Device kit Use as instructed.    cholecalciferol (VITAMIN D3) 25 MCG (1000 UT) tablet Take 1 tablet by mouth Daily.    Farxiga 10 MG tablet Take 1 Tablet by mouth " Every Morning Before Breakfast.    glucose blood (Contour Next Test) test strip CHECK FASTING BLOOD SUGAR ONCE DAILY    hydroCHLOROthiazide 12.5 MG tablet Take 1 tablet by mouth Daily.    Lancet Devices (ACCU-CHEK SOFTCLIX) lancets Test one time daily    metFORMIN ER (GLUCOPHAGE-XR) 500 MG 24 hr tablet Take 2 tablets by mouth 2 (Two) Times a Day With Meals.    olmesartan (BENICAR) 40 MG tablet Take 1 tablet by mouth Daily.    [DISCONTINUED] SITagliptin (Januvia) 100 MG tablet Take 1 tablet by mouth Daily.     No current facility-administered medications on file prior to visit.         HISTORY OF PRESENT ILLNESS     A1c for average glucose level is same at 7.2 with Januvia 100 mg. Weight unchanged. Has never taken GLP-1/GIP agonist but is willing. Going to [place] for 3 weeks with family. Will start when he gets back.     Hypertension and hyperlipidemia are controlled with medications. SBP < 110 but does not check at home. Denies lightheadedness.     Lab Results   Component Value Date    HGBA1C 7.20 (H) 05/22/2025    HGBA1C 7.2 (H) 01/24/2025    HGBA1C 7.30 (H) 09/20/2024    CREATININE 0.76 09/20/2024    LDL 70 09/20/2024    MALBCRERATIO <4 05/20/2024     Blood sugar readings recorded on patient's flowsheet:       No data to display               106 kg (233 lb)    Patient Care Team:  Al Francisco MD as PCP - General (Internal Medicine)    REVIEW OF SYSTEMS     Constitutional neg except per HPI   Resp neg  CV neg   GI neg    neg       PHYSICAL EXAMINATION     Physical Exam  Alert with normal thought and judgment.       REVIEWED DATA     Labs:       Lab Results   Component Value Date     09/20/2024    K 4.3 09/20/2024    CALCIUM 9.8 09/20/2024    AST 20 09/20/2024    ALT 27 09/20/2024    BUN 19 09/20/2024    CREATININE 0.76 09/20/2024    CREATININE 0.86 05/20/2024    CREATININE 0.83 10/17/2023    EGFR 104.8 09/20/2024     Lab Results   Component Value Date    HGBA1C 7.20 (H) 05/22/2025    HGBA1C 7.2 (H)  01/24/2025    HGBA1C 7.30 (H) 09/20/2024     Lab Results   Component Value Date    MALBCRERATIO <4 05/20/2024     Lab Results   Component Value Date    LDL 70 09/20/2024    LDL 54 10/17/2023    LDL 58 06/30/2023    HDL 41 09/20/2024    HDL 41 10/17/2023    TRIG 168 (H) 09/20/2024    CHOLHDLRATIO 3.41 09/20/2024     Lab Results   Component Value Date    TSH 2.940 01/19/2018    TSH 3.010 05/19/2017    TSH 5.630 (H) 01/20/2017    FREET4 1.24 01/19/2018    FREET4 1.19 05/19/2017    FREET4 1.27 01/20/2017     Lab Results   Component Value Date    WBC 5.68 09/26/2022    HGB 16.2 09/26/2022     09/26/2022         Imaging:           Medical Tests:           Summary of old records / correspondence / consultant report:           Request outside records:

## 2025-05-30 NOTE — ASSESSMENT & PLAN NOTE
BP Readings from Last 3 Encounters:   05/30/25 108/74   01/24/25 124/72   09/20/24 (!) 138/106      Blood pressure is lower. Monitor home blood pressure readings.  If blood pressure is consistently < 110 SBP, will need to adjust medication.

## 2025-05-30 NOTE — ASSESSMENT & PLAN NOTE
Lab Results   Component Value Date    HGBA1C 7.20 (H) 05/22/2025    HGBA1C 7.2 (H) 01/24/2025    HGBA1C 7.30 (H) 09/20/2024    CREATININE 0.76 09/20/2024    LDL 70 09/20/2024    MALBCRERATIO <4 05/20/2024      After returning from [place] in 3 weeks, start Ozempic 0.25 mg weekly x 4 weeks, then 0.5 mg weekly.   Continue Januvia 100 mg for 4 weeks, then discontinue.   Continue metformin  mg 2 BID and Farxiga (dapagliflozin) 10 mg qAM.  A1c prior to follow-up in 3 months.     Advised to watch for nausea/vomiting, abdominal pain, constipation, and diarrhea.  GLP-1/GIP agonist information on AVS.

## 2025-05-30 NOTE — PATIENT INSTRUCTIONS
** IMPORTANT MESSAGE FROM DR. CARO **    In our office, your satisfaction is VERY important to us.     You may receive a survey from [facility] by mail or E-mail for you to provide feedback about your visit. This information is invaluable for me to know what we can do to improve our services.     I ask that you please take a few minutes to complete the survey and let us know how we are doing in serving your needs. (You may receive the survey more than once for multiple visits)    Thank You !    Dr. Caro    _________________________________________________________________________________________________________________________      ** ADDITIONAL INSTRUCTION / REMINDERS FROM DR. CARO **

## 2025-05-31 LAB
ALBUMIN/CREAT UR: <5 MG/G CREAT (ref 0–29)
CREAT UR-MCNC: 62.2 MG/DL
MICROALBUMIN UR-MCNC: <3 UG/ML

## 2025-06-03 DIAGNOSIS — E11.65 TYPE 2 DIABETES MELLITUS WITH HYPERGLYCEMIA, WITHOUT LONG-TERM CURRENT USE OF INSULIN: Chronic | ICD-10-CM

## 2025-06-03 RX ORDER — SITAGLIPTIN 100 MG/1
100 TABLET, FILM COATED ORAL DAILY
Qty: 30 TABLET | Refills: 4 | Status: CANCELLED | OUTPATIENT
Start: 2025-06-03

## 2025-07-14 ENCOUNTER — PATIENT MESSAGE (OUTPATIENT)
Dept: INTERNAL MEDICINE | Age: 60
End: 2025-07-14
Payer: COMMERCIAL

## 2025-07-14 DIAGNOSIS — E11.65 TYPE 2 DIABETES MELLITUS WITH HYPERGLYCEMIA, WITHOUT LONG-TERM CURRENT USE OF INSULIN: Chronic | ICD-10-CM

## 2025-07-25 ENCOUNTER — TELEPHONE (OUTPATIENT)
Dept: INTERNAL MEDICINE | Age: 60
End: 2025-07-25
Payer: COMMERCIAL

## 2025-07-25 DIAGNOSIS — E11.65 TYPE 2 DIABETES MELLITUS WITH HYPERGLYCEMIA, WITHOUT LONG-TERM CURRENT USE OF INSULIN: Primary | Chronic | ICD-10-CM

## 2025-08-02 DIAGNOSIS — I10 PRIMARY HYPERTENSION: Chronic | ICD-10-CM

## 2025-08-04 RX ORDER — AMLODIPINE BESYLATE 5 MG/1
5 TABLET ORAL EVERY EVENING
Qty: 90 TABLET | Refills: 1 | Status: SHIPPED | OUTPATIENT
Start: 2025-08-04

## 2025-08-06 ENCOUNTER — TELEPHONE (OUTPATIENT)
Dept: INTERNAL MEDICINE | Age: 60
End: 2025-08-06
Payer: COMMERCIAL

## 2025-08-08 DIAGNOSIS — E11.65 TYPE 2 DIABETES MELLITUS WITH HYPERGLYCEMIA, WITHOUT LONG-TERM CURRENT USE OF INSULIN: Chronic | ICD-10-CM

## 2025-08-08 DIAGNOSIS — E11.65 TYPE 2 DIABETES MELLITUS WITH HYPERGLYCEMIA, WITHOUT LONG-TERM CURRENT USE OF INSULIN: Primary | Chronic | ICD-10-CM

## 2025-08-08 RX ORDER — SITAGLIPTIN 100 MG/1
100 TABLET, FILM COATED ORAL DAILY
Qty: 30 TABLET | Refills: 4 | Status: CANCELLED | OUTPATIENT
Start: 2025-08-08

## 2025-08-08 RX ORDER — LINAGLIPTIN 5 MG/1
5 TABLET, FILM COATED ORAL DAILY
Qty: 30 TABLET | Refills: 3 | Status: SHIPPED | OUTPATIENT
Start: 2025-08-08

## 2025-08-19 DIAGNOSIS — E78.5 HYPERLIPIDEMIA, UNSPECIFIED HYPERLIPIDEMIA TYPE: Chronic | ICD-10-CM

## 2025-08-19 DIAGNOSIS — E11.65 TYPE 2 DIABETES MELLITUS WITH HYPERGLYCEMIA, WITHOUT LONG-TERM CURRENT USE OF INSULIN: ICD-10-CM

## 2025-08-19 DIAGNOSIS — I10 PRIMARY HYPERTENSION: Chronic | ICD-10-CM

## 2025-08-19 RX ORDER — ATORVASTATIN CALCIUM 40 MG/1
40 TABLET, FILM COATED ORAL NIGHTLY
Qty: 90 TABLET | Refills: 1 | Status: SHIPPED | OUTPATIENT
Start: 2025-08-19

## 2025-08-19 RX ORDER — HYDROCHLOROTHIAZIDE 12.5 MG/1
12.5 TABLET ORAL DAILY
Qty: 90 TABLET | Refills: 1 | Status: SHIPPED | OUTPATIENT
Start: 2025-08-19

## 2025-08-19 RX ORDER — METFORMIN HYDROCHLORIDE 500 MG/1
1000 TABLET, EXTENDED RELEASE ORAL 2 TIMES DAILY WITH MEALS
Qty: 360 TABLET | Refills: 1 | Status: SHIPPED | OUTPATIENT
Start: 2025-08-19

## 2025-08-19 RX ORDER — OLMESARTAN MEDOXOMIL 40 MG/1
40 TABLET ORAL DAILY
Qty: 90 TABLET | Refills: 1 | Status: SHIPPED | OUTPATIENT
Start: 2025-08-19

## (undated) DEVICE — FLEX ADVANTAGE 1500CC: Brand: FLEX ADVANTAGE

## (undated) DEVICE — GOWN ,SIRUS,NONREINFORCED 3XL: Brand: MEDLINE

## (undated) DEVICE — Device

## (undated) DEVICE — KT ORCA ORCAPOD DISP STRL

## (undated) DEVICE — SINGLE-USE BIOPSY FORCEPS: Brand: RADIAL JAW 4

## (undated) DEVICE — GOWN ISOL W/THUMB UNIV BLU BX/15

## (undated) DEVICE — VIAL FORMLN CAP 10PCT 20ML

## (undated) DEVICE — CANN NASL CO2 TRULINK W/O2 A/